# Patient Record
Sex: MALE | Race: WHITE | NOT HISPANIC OR LATINO | Employment: OTHER | URBAN - METROPOLITAN AREA
[De-identification: names, ages, dates, MRNs, and addresses within clinical notes are randomized per-mention and may not be internally consistent; named-entity substitution may affect disease eponyms.]

---

## 2017-01-14 ENCOUNTER — COMMUNICATION - HEALTHEAST (OUTPATIENT)
Dept: FAMILY MEDICINE | Facility: CLINIC | Age: 72
End: 2017-01-14

## 2017-02-06 ENCOUNTER — AMBULATORY - HEALTHEAST (OUTPATIENT)
Dept: CARDIOLOGY | Facility: CLINIC | Age: 72
End: 2017-02-06

## 2017-02-06 DIAGNOSIS — Z95.810 ICD (IMPLANTABLE CARDIOVERTER-DEFIBRILLATOR) IN PLACE: ICD-10-CM

## 2017-02-17 ENCOUNTER — COMMUNICATION - HEALTHEAST (OUTPATIENT)
Dept: CARDIOLOGY | Facility: CLINIC | Age: 72
End: 2017-02-17

## 2017-02-17 DIAGNOSIS — I42.9 CARDIOMYOPATHY (H): ICD-10-CM

## 2017-02-17 DIAGNOSIS — I51.9 LV DYSFUNCTION: ICD-10-CM

## 2017-02-17 DIAGNOSIS — I10 UNSPECIFIED ESSENTIAL HYPERTENSION: ICD-10-CM

## 2017-02-21 ENCOUNTER — COMMUNICATION - HEALTHEAST (OUTPATIENT)
Dept: CARDIOLOGY | Facility: CLINIC | Age: 72
End: 2017-02-21

## 2017-02-21 DIAGNOSIS — I51.9 LV DYSFUNCTION: ICD-10-CM

## 2017-02-22 ENCOUNTER — COMMUNICATION - HEALTHEAST (OUTPATIENT)
Dept: CARDIOLOGY | Facility: CLINIC | Age: 72
End: 2017-02-22

## 2017-02-22 DIAGNOSIS — I42.9 CARDIOMYOPATHY (H): ICD-10-CM

## 2017-03-14 ENCOUNTER — COMMUNICATION - HEALTHEAST (OUTPATIENT)
Dept: CARDIOLOGY | Facility: CLINIC | Age: 72
End: 2017-03-14

## 2017-03-14 DIAGNOSIS — E78.5 HYPERLIPIDEMIA: ICD-10-CM

## 2017-04-24 ENCOUNTER — COMMUNICATION - HEALTHEAST (OUTPATIENT)
Dept: CARDIOLOGY | Facility: CLINIC | Age: 72
End: 2017-04-24

## 2017-04-24 DIAGNOSIS — I42.9 CARDIOMYOPATHY (H): ICD-10-CM

## 2017-04-24 DIAGNOSIS — I10 UNSPECIFIED ESSENTIAL HYPERTENSION: ICD-10-CM

## 2017-04-25 ENCOUNTER — AMBULATORY - HEALTHEAST (OUTPATIENT)
Dept: CARDIOLOGY | Facility: CLINIC | Age: 72
End: 2017-04-25

## 2017-04-25 ENCOUNTER — OFFICE VISIT - HEALTHEAST (OUTPATIENT)
Dept: CARDIOLOGY | Facility: CLINIC | Age: 72
End: 2017-04-25

## 2017-04-25 DIAGNOSIS — Z95.810 ICD (IMPLANTABLE CARDIOVERTER-DEFIBRILLATOR), BIVENTRICULAR, IN SITU: ICD-10-CM

## 2017-04-25 DIAGNOSIS — G47.33 OSA ON CPAP: ICD-10-CM

## 2017-04-25 DIAGNOSIS — I46.9 CARDIAC ARREST (H): ICD-10-CM

## 2017-04-25 DIAGNOSIS — I10 ESSENTIAL HYPERTENSION WITH GOAL BLOOD PRESSURE LESS THAN 130/80: ICD-10-CM

## 2017-04-25 ASSESSMENT — MIFFLIN-ST. JEOR: SCORE: 1816.17

## 2017-05-17 ENCOUNTER — OFFICE VISIT - HEALTHEAST (OUTPATIENT)
Dept: FAMILY MEDICINE | Facility: CLINIC | Age: 72
End: 2017-05-17

## 2017-05-17 DIAGNOSIS — I25.5 ISCHEMIC CARDIOMYOPATHY: ICD-10-CM

## 2017-05-17 DIAGNOSIS — R73.01 IMPAIRED FASTING GLUCOSE: ICD-10-CM

## 2017-05-17 DIAGNOSIS — I10 ESSENTIAL HYPERTENSION WITH GOAL BLOOD PRESSURE LESS THAN 130/80: ICD-10-CM

## 2017-05-17 DIAGNOSIS — I25.10 CORONARY ARTERY DISEASE INVOLVING NATIVE CORONARY ARTERY OF NATIVE HEART WITHOUT ANGINA PECTORIS: ICD-10-CM

## 2017-06-11 ENCOUNTER — COMMUNICATION - HEALTHEAST (OUTPATIENT)
Dept: CARDIOLOGY | Facility: CLINIC | Age: 72
End: 2017-06-11

## 2017-06-11 DIAGNOSIS — E78.5 HYPERLIPIDEMIA: ICD-10-CM

## 2017-07-24 ENCOUNTER — AMBULATORY - HEALTHEAST (OUTPATIENT)
Dept: CARDIOLOGY | Facility: CLINIC | Age: 72
End: 2017-07-24

## 2017-07-24 DIAGNOSIS — Z95.810 ICD (IMPLANTABLE CARDIOVERTER-DEFIBRILLATOR), BIVENTRICULAR, IN SITU: ICD-10-CM

## 2017-07-24 LAB — HCC DEVICE COMMENTS: NORMAL

## 2017-08-21 ENCOUNTER — COMMUNICATION - HEALTHEAST (OUTPATIENT)
Dept: CARDIOLOGY | Facility: CLINIC | Age: 72
End: 2017-08-21

## 2017-08-21 DIAGNOSIS — I42.9 CARDIOMYOPATHY (H): ICD-10-CM

## 2017-08-21 DIAGNOSIS — I10 UNSPECIFIED ESSENTIAL HYPERTENSION: ICD-10-CM

## 2017-10-28 ENCOUNTER — COMMUNICATION - HEALTHEAST (OUTPATIENT)
Dept: CARDIOLOGY | Facility: CLINIC | Age: 72
End: 2017-10-28

## 2017-10-28 DIAGNOSIS — I42.9 CARDIOMYOPATHY (H): ICD-10-CM

## 2017-10-28 DIAGNOSIS — E78.5 HYPERLIPIDEMIA: ICD-10-CM

## 2017-10-30 ENCOUNTER — AMBULATORY - HEALTHEAST (OUTPATIENT)
Dept: CARDIOLOGY | Facility: CLINIC | Age: 72
End: 2017-10-30

## 2017-10-30 DIAGNOSIS — Z95.810 ICD (IMPLANTABLE CARDIOVERTER-DEFIBRILLATOR), BIVENTRICULAR, IN SITU: ICD-10-CM

## 2017-10-30 LAB — HCC DEVICE COMMENTS: NORMAL

## 2017-11-15 ENCOUNTER — OFFICE VISIT - HEALTHEAST (OUTPATIENT)
Dept: FAMILY MEDICINE | Facility: CLINIC | Age: 72
End: 2017-11-15

## 2017-11-15 DIAGNOSIS — Z23 NEED FOR VACCINATION: ICD-10-CM

## 2017-11-15 DIAGNOSIS — E66.09 CLASS 1 OBESITY DUE TO EXCESS CALORIES WITH SERIOUS COMORBIDITY AND BODY MASS INDEX (BMI) OF 33.0 TO 33.9 IN ADULT: ICD-10-CM

## 2017-11-15 DIAGNOSIS — I25.10 CORONARY ARTERY DISEASE INVOLVING NATIVE CORONARY ARTERY OF NATIVE HEART WITHOUT ANGINA PECTORIS: ICD-10-CM

## 2017-11-15 DIAGNOSIS — I25.5 ISCHEMIC CARDIOMYOPATHY: ICD-10-CM

## 2017-11-15 DIAGNOSIS — I10 ESSENTIAL HYPERTENSION WITH GOAL BLOOD PRESSURE LESS THAN 130/80: ICD-10-CM

## 2017-11-15 DIAGNOSIS — D12.6 TUBULAR ADENOMA OF COLON: ICD-10-CM

## 2017-11-15 DIAGNOSIS — N52.9 ERECTILE DYSFUNCTION, UNSPECIFIED ERECTILE DYSFUNCTION TYPE: ICD-10-CM

## 2017-11-15 DIAGNOSIS — G47.33 OSA ON CPAP: ICD-10-CM

## 2017-11-15 DIAGNOSIS — L91.8 SKIN TAG: ICD-10-CM

## 2017-11-15 DIAGNOSIS — E78.00 PURE HYPERCHOLESTEROLEMIA: ICD-10-CM

## 2017-11-15 DIAGNOSIS — R73.01 IMPAIRED FASTING GLUCOSE: ICD-10-CM

## 2017-11-15 DIAGNOSIS — H91.93 BILATERAL HEARING LOSS: ICD-10-CM

## 2017-11-15 DIAGNOSIS — E66.811 CLASS 1 OBESITY DUE TO EXCESS CALORIES WITH SERIOUS COMORBIDITY AND BODY MASS INDEX (BMI) OF 33.0 TO 33.9 IN ADULT: ICD-10-CM

## 2017-11-15 DIAGNOSIS — Z00.01 ENCOUNTER FOR GENERAL ADULT MEDICAL EXAMINATION WITH ABNORMAL FINDINGS: ICD-10-CM

## 2017-11-15 LAB
CHOLEST SERPL-MCNC: 128 MG/DL
FASTING STATUS PATIENT QL REPORTED: YES
HBA1C MFR BLD: 6.1 % (ref 3.5–6)
HDLC SERPL-MCNC: 42 MG/DL
LDLC SERPL CALC-MCNC: 65 MG/DL
TRIGL SERPL-MCNC: 106 MG/DL

## 2017-11-15 ASSESSMENT — MIFFLIN-ST. JEOR: SCORE: 1838.29

## 2017-11-16 ENCOUNTER — COMMUNICATION - HEALTHEAST (OUTPATIENT)
Dept: FAMILY MEDICINE | Facility: CLINIC | Age: 72
End: 2017-11-16

## 2018-01-19 ENCOUNTER — COMMUNICATION - HEALTHEAST (OUTPATIENT)
Dept: FAMILY MEDICINE | Facility: CLINIC | Age: 73
End: 2018-01-19

## 2018-01-19 DIAGNOSIS — K21.9 GERD (GASTROESOPHAGEAL REFLUX DISEASE): ICD-10-CM

## 2018-01-19 DIAGNOSIS — E29.1 HYPOGONADISM MALE: ICD-10-CM

## 2018-01-20 ENCOUNTER — COMMUNICATION - HEALTHEAST (OUTPATIENT)
Dept: FAMILY MEDICINE | Facility: CLINIC | Age: 73
End: 2018-01-20

## 2018-01-20 DIAGNOSIS — G89.4 CHRONIC PAIN SYNDROME: ICD-10-CM

## 2018-01-24 ENCOUNTER — COMMUNICATION - HEALTHEAST (OUTPATIENT)
Dept: FAMILY MEDICINE | Facility: CLINIC | Age: 73
End: 2018-01-24

## 2018-01-25 ENCOUNTER — RECORDS - HEALTHEAST (OUTPATIENT)
Dept: ADMINISTRATIVE | Facility: OTHER | Age: 73
End: 2018-01-25

## 2018-02-04 ENCOUNTER — COMMUNICATION - HEALTHEAST (OUTPATIENT)
Dept: FAMILY MEDICINE | Facility: CLINIC | Age: 73
End: 2018-02-04

## 2018-02-05 ENCOUNTER — AMBULATORY - HEALTHEAST (OUTPATIENT)
Dept: CARDIOLOGY | Facility: CLINIC | Age: 73
End: 2018-02-05

## 2018-02-05 ENCOUNTER — COMMUNICATION - HEALTHEAST (OUTPATIENT)
Dept: ADMINISTRATIVE | Facility: CLINIC | Age: 73
End: 2018-02-05

## 2018-02-05 DIAGNOSIS — Z95.810 ICD (IMPLANTABLE CARDIOVERTER-DEFIBRILLATOR), BIVENTRICULAR, IN SITU: ICD-10-CM

## 2018-02-05 DIAGNOSIS — I46.9 CARDIAC ARREST (H): ICD-10-CM

## 2018-02-05 LAB — HCC DEVICE COMMENTS: NORMAL

## 2018-02-07 ENCOUNTER — COMMUNICATION - HEALTHEAST (OUTPATIENT)
Dept: FAMILY MEDICINE | Facility: CLINIC | Age: 73
End: 2018-02-07

## 2018-02-07 DIAGNOSIS — E29.1 HYPOGONADISM MALE: ICD-10-CM

## 2018-02-09 ENCOUNTER — COMMUNICATION - HEALTHEAST (OUTPATIENT)
Dept: FAMILY MEDICINE | Facility: CLINIC | Age: 73
End: 2018-02-09

## 2018-02-20 ENCOUNTER — COMMUNICATION - HEALTHEAST (OUTPATIENT)
Dept: CARDIOLOGY | Facility: CLINIC | Age: 73
End: 2018-02-20

## 2018-02-20 DIAGNOSIS — I51.9 LV DYSFUNCTION: ICD-10-CM

## 2018-02-21 ENCOUNTER — AMBULATORY - HEALTHEAST (OUTPATIENT)
Dept: CARDIOLOGY | Facility: CLINIC | Age: 73
End: 2018-02-21

## 2018-02-21 DIAGNOSIS — I51.9 LV DYSFUNCTION: ICD-10-CM

## 2018-03-22 ENCOUNTER — COMMUNICATION - HEALTHEAST (OUTPATIENT)
Dept: FAMILY MEDICINE | Facility: CLINIC | Age: 73
End: 2018-03-22

## 2018-06-06 ENCOUNTER — COMMUNICATION - HEALTHEAST (OUTPATIENT)
Dept: CARDIOLOGY | Facility: CLINIC | Age: 73
End: 2018-06-06

## 2018-06-06 ENCOUNTER — AMBULATORY - HEALTHEAST (OUTPATIENT)
Dept: CARDIOLOGY | Facility: CLINIC | Age: 73
End: 2018-06-06

## 2018-06-06 DIAGNOSIS — E78.5 HYPERLIPIDEMIA: ICD-10-CM

## 2018-06-06 DIAGNOSIS — I42.9 CARDIOMYOPATHY (H): ICD-10-CM

## 2018-06-06 DIAGNOSIS — I10 ESSENTIAL HYPERTENSION: ICD-10-CM

## 2018-06-06 DIAGNOSIS — I25.5 ISCHEMIC CARDIOMYOPATHY: ICD-10-CM

## 2018-06-12 ENCOUNTER — OFFICE VISIT - HEALTHEAST (OUTPATIENT)
Dept: FAMILY MEDICINE | Facility: CLINIC | Age: 73
End: 2018-06-12

## 2018-06-12 DIAGNOSIS — J30.9 AR (ALLERGIC RHINITIS): ICD-10-CM

## 2018-06-12 DIAGNOSIS — I10 ESSENTIAL HYPERTENSION WITH GOAL BLOOD PRESSURE LESS THAN 130/80: ICD-10-CM

## 2018-06-12 DIAGNOSIS — E78.00 PURE HYPERCHOLESTEROLEMIA: ICD-10-CM

## 2018-06-12 DIAGNOSIS — I25.5 ISCHEMIC CARDIOMYOPATHY: ICD-10-CM

## 2018-06-12 DIAGNOSIS — R73.01 IMPAIRED FASTING GLUCOSE: ICD-10-CM

## 2018-06-12 LAB
ANION GAP SERPL CALCULATED.3IONS-SCNC: 9 MMOL/L (ref 5–18)
BNP SERPL-MCNC: 30 PG/ML (ref 0–69)
BUN SERPL-MCNC: 10 MG/DL (ref 8–28)
CALCIUM SERPL-MCNC: 9.6 MG/DL (ref 8.5–10.5)
CHLORIDE BLD-SCNC: 104 MMOL/L (ref 98–107)
CHOLEST SERPL-MCNC: 138 MG/DL
CO2 SERPL-SCNC: 25 MMOL/L (ref 22–31)
CREAT SERPL-MCNC: 1.09 MG/DL (ref 0.7–1.3)
FASTING STATUS PATIENT QL REPORTED: YES
GFR SERPL CREATININE-BSD FRML MDRD: >60 ML/MIN/1.73M2
GLUCOSE BLD-MCNC: 85 MG/DL (ref 70–125)
HBA1C MFR BLD: 5.7 % (ref 3.5–6)
HDLC SERPL-MCNC: 41 MG/DL
LDLC SERPL CALC-MCNC: 76 MG/DL
POTASSIUM BLD-SCNC: 4.6 MMOL/L (ref 3.5–5)
SODIUM SERPL-SCNC: 138 MMOL/L (ref 136–145)
TRIGL SERPL-MCNC: 103 MG/DL

## 2018-06-14 ENCOUNTER — AMBULATORY - HEALTHEAST (OUTPATIENT)
Dept: CARDIOLOGY | Facility: CLINIC | Age: 73
End: 2018-06-14

## 2018-06-14 DIAGNOSIS — Z95.810 ICD (IMPLANTABLE CARDIOVERTER-DEFIBRILLATOR), BIVENTRICULAR, IN SITU: ICD-10-CM

## 2018-06-14 LAB
HCC DEVICE COMMENTS: NORMAL
HCC DEVICE IMPLANTING PROVIDER: NORMAL
HCC DEVICE MANUFACTURE: NORMAL
HCC DEVICE MODEL: NORMAL
HCC DEVICE SERIAL NUMBER: NORMAL
HCC DEVICE TYPE: NORMAL

## 2018-06-21 ENCOUNTER — COMMUNICATION - HEALTHEAST (OUTPATIENT)
Dept: FAMILY MEDICINE | Facility: CLINIC | Age: 73
End: 2018-06-21

## 2018-06-21 DIAGNOSIS — F52.8 PSYCHOSEXUAL DYSFUNCTION WITH INHIBITED SEXUAL EXCITEMENT: ICD-10-CM

## 2018-06-27 ENCOUNTER — COMMUNICATION - HEALTHEAST (OUTPATIENT)
Dept: FAMILY MEDICINE | Facility: CLINIC | Age: 73
End: 2018-06-27

## 2018-06-27 DIAGNOSIS — F52.8 PSYCHOSEXUAL DYSFUNCTION WITH INHIBITED SEXUAL EXCITEMENT: ICD-10-CM

## 2018-08-14 ENCOUNTER — COMMUNICATION - HEALTHEAST (OUTPATIENT)
Dept: CARDIOLOGY | Facility: CLINIC | Age: 73
End: 2018-08-14

## 2018-08-14 DIAGNOSIS — E78.5 HYPERLIPIDEMIA: ICD-10-CM

## 2018-08-14 DIAGNOSIS — I10 ESSENTIAL HYPERTENSION: ICD-10-CM

## 2018-08-14 DIAGNOSIS — I51.9 LV DYSFUNCTION: ICD-10-CM

## 2018-08-14 DIAGNOSIS — I42.9 CARDIOMYOPATHY (H): ICD-10-CM

## 2018-08-22 ENCOUNTER — COMMUNICATION - HEALTHEAST (OUTPATIENT)
Dept: CARDIOLOGY | Facility: CLINIC | Age: 73
End: 2018-08-22

## 2018-08-23 ENCOUNTER — HOSPITAL ENCOUNTER (OUTPATIENT)
Dept: CARDIOLOGY | Facility: HOSPITAL | Age: 73
Discharge: HOME | End: 2018-08-23
Attending: INTERNAL MEDICINE

## 2018-08-23 DIAGNOSIS — Z95.810 ICD (IMPLANTABLE CARDIOVERTER-DEFIBRILLATOR), BIVENTRICULAR, IN SITU: ICD-10-CM

## 2018-08-23 DIAGNOSIS — I46.9 CARDIAC ARREST (H): ICD-10-CM

## 2018-08-23 ASSESSMENT — MIFFLIN-ST. JEOR: SCORE: 1811.07

## 2018-08-24 LAB
AORTIC ROOT: 3.6 CM
AR DECEL SLOPE: 2110 MM/S2
AR PEAK VELOCITY: 312 CM/S
AV REGURGITANT PEAK GRADIENT: 38.9 MMHG
AV REGURGITATION PRESSURE HALF TIME: 433 MS
BSA FOR ECHO PROCEDURE: 2.29 M2
CV BLOOD PRESSURE: NORMAL MMHG
CV ECHO HEIGHT: 70.5 IN
CV ECHO WEIGHT: 233 LBS
DOP CALC LVOT AREA: 3.46 CM2
DOP CALC LVOT DIAMETER: 2.1 CM
DOP CALC LVOT STROKE VOLUME: 63.7 CM3
DOP CALCLVOT PEAK VEL VTI: 18.4 CM
EJECTION FRACTION: 51 % (ref 55–75)
LA AREA 1: 16.2 CM2
LA AREA 2: 18.2 CM2
LEFT ATRIUM LENGTH: 5.15 CM
LEFT ATRIUM SIZE: 3.9 CM
LEFT ATRIUM TO AORTIC ROOT RATIO: 0.94 NO UNITS
LEFT ATRIUM VOLUME INDEX: 21.3 ML/M2
LEFT ATRIUM VOLUME: 48.7 ML
LEFT VENTRICLE CARDIAC INDEX: 1.9 L/MIN/M2
LEFT VENTRICLE CARDIAC OUTPUT: 4.3 L/MIN
LEFT VENTRICLE DIASTOLIC VOLUME INDEX: 64.6 CM3/M2 (ref 34–74)
LEFT VENTRICLE DIASTOLIC VOLUME: 148 CM3 (ref 62–150)
LEFT VENTRICLE HEART RATE: 67 BPM
LEFT VENTRICLE SYSTOLIC VOLUME INDEX: 31.4 CM3/M2 (ref 11–31)
LEFT VENTRICLE SYSTOLIC VOLUME: 72 CM3 (ref 21–61)
LEFT VENTRICULAR OUTFLOW TRACT MEAN GRADIENT: 2 MMHG
LEFT VENTRICULAR OUTFLOW TRACT MEAN VELOCITY: 60.3 CM/S
LV STROKE VOLUME INDEX: 27.8 ML/M2
MITRAL VALVE E/A RATIO: 0.7
MV AVERAGE E/E' RATIO: 8.6 CM/S
MV DECELERATION TIME: 250 MS
MV E'TISSUE VEL-LAT: 7.99 CM/S
MV E'TISSUE VEL-MED: 4.58 CM/S
MV LATERAL E/E' RATIO: 6.7
MV MEDIAL E/E' RATIO: 11.7
MV PEAK A VELOCITY: 76 CM/S
MV PEAK E VELOCITY: 53.8 CM/S
NUC REST DIASTOLIC VOLUME INDEX: 3728 LBS
NUC REST SYSTOLIC VOLUME INDEX: 70.5 IN
TRICUSPID VALVE ANULAR PLANE SYSTOLIC EXCURSION: 1.8 CM

## 2018-08-30 ENCOUNTER — OFFICE VISIT - HEALTHEAST (OUTPATIENT)
Dept: CARDIOLOGY | Facility: CLINIC | Age: 73
End: 2018-08-30

## 2018-08-30 ENCOUNTER — AMBULATORY - HEALTHEAST (OUTPATIENT)
Dept: CARDIOLOGY | Facility: CLINIC | Age: 73
End: 2018-08-30

## 2018-08-30 DIAGNOSIS — G47.33 OSA ON CPAP: ICD-10-CM

## 2018-08-30 DIAGNOSIS — Z95.810 ICD (IMPLANTABLE CARDIOVERTER-DEFIBRILLATOR), BIVENTRICULAR, IN SITU: ICD-10-CM

## 2018-08-30 DIAGNOSIS — I47.29 NSVT (NONSUSTAINED VENTRICULAR TACHYCARDIA) (H): ICD-10-CM

## 2018-09-30 ENCOUNTER — COMMUNICATION - HEALTHEAST (OUTPATIENT)
Dept: FAMILY MEDICINE | Facility: CLINIC | Age: 73
End: 2018-09-30

## 2018-10-01 ENCOUNTER — OFFICE VISIT - HEALTHEAST (OUTPATIENT)
Dept: FAMILY MEDICINE | Facility: CLINIC | Age: 73
End: 2018-10-01

## 2018-10-01 DIAGNOSIS — K59.00 CONSTIPATION, UNSPECIFIED CONSTIPATION TYPE: ICD-10-CM

## 2018-10-01 DIAGNOSIS — Z23 NEED FOR VACCINATION: ICD-10-CM

## 2018-10-01 DIAGNOSIS — I10 ESSENTIAL HYPERTENSION: ICD-10-CM

## 2018-10-01 DIAGNOSIS — R19.04 ABDOMINAL SWELLING, LEFT LOWER QUADRANT: ICD-10-CM

## 2018-10-01 LAB
ERYTHROCYTE [DISTWIDTH] IN BLOOD BY AUTOMATED COUNT: 12 % (ref 11–14.5)
HCT VFR BLD AUTO: 48.3 % (ref 40–54)
HGB BLD-MCNC: 16.5 G/DL (ref 14–18)
MCH RBC QN AUTO: 32.5 PG (ref 27–34)
MCHC RBC AUTO-ENTMCNC: 34.2 G/DL (ref 32–36)
MCV RBC AUTO: 95 FL (ref 80–100)
PLATELET # BLD AUTO: 155 THOU/UL (ref 140–440)
PMV BLD AUTO: 8.3 FL (ref 7–10)
RBC # BLD AUTO: 5.09 MILL/UL (ref 4.4–6.2)
WBC: 7.7 THOU/UL (ref 4–11)

## 2018-10-01 ASSESSMENT — MIFFLIN-ST. JEOR: SCORE: 1773.88

## 2018-10-03 ENCOUNTER — HOSPITAL ENCOUNTER (OUTPATIENT)
Dept: ULTRASOUND IMAGING | Facility: HOSPITAL | Age: 73
Discharge: HOME | End: 2018-10-03
Attending: NURSE PRACTITIONER

## 2018-10-03 DIAGNOSIS — R19.04 ABDOMINAL SWELLING, LEFT LOWER QUADRANT: ICD-10-CM

## 2018-10-04 ENCOUNTER — COMMUNICATION - HEALTHEAST (OUTPATIENT)
Dept: FAMILY MEDICINE | Facility: CLINIC | Age: 73
End: 2018-10-04

## 2018-10-25 ENCOUNTER — COMMUNICATION - HEALTHEAST (OUTPATIENT)
Dept: CARDIOLOGY | Facility: CLINIC | Age: 73
End: 2018-10-25

## 2018-10-25 DIAGNOSIS — I42.9 CARDIOMYOPATHY (H): ICD-10-CM

## 2018-10-25 DIAGNOSIS — I10 ESSENTIAL HYPERTENSION: ICD-10-CM

## 2018-10-25 DIAGNOSIS — E78.5 HYPERLIPIDEMIA: ICD-10-CM

## 2018-10-25 DIAGNOSIS — I51.9 LV DYSFUNCTION: ICD-10-CM

## 2018-12-03 ENCOUNTER — AMBULATORY - HEALTHEAST (OUTPATIENT)
Dept: CARDIOLOGY | Facility: CLINIC | Age: 73
End: 2018-12-03

## 2018-12-03 DIAGNOSIS — Z95.810 ICD (IMPLANTABLE CARDIOVERTER-DEFIBRILLATOR), BIVENTRICULAR, IN SITU: ICD-10-CM

## 2018-12-12 ENCOUNTER — OFFICE VISIT - HEALTHEAST (OUTPATIENT)
Dept: FAMILY MEDICINE | Facility: CLINIC | Age: 73
End: 2018-12-12

## 2018-12-12 DIAGNOSIS — H91.93 BILATERAL HEARING LOSS, UNSPECIFIED HEARING LOSS TYPE: ICD-10-CM

## 2018-12-12 DIAGNOSIS — E66.811 CLASS 1 OBESITY DUE TO EXCESS CALORIES WITH SERIOUS COMORBIDITY AND BODY MASS INDEX (BMI) OF 31.0 TO 31.9 IN ADULT: ICD-10-CM

## 2018-12-12 DIAGNOSIS — E29.1 HYPOGONADISM MALE: ICD-10-CM

## 2018-12-12 DIAGNOSIS — F52.8 PSYCHOSEXUAL DYSFUNCTION WITH INHIBITED SEXUAL EXCITEMENT: ICD-10-CM

## 2018-12-12 DIAGNOSIS — E66.09 CLASS 1 OBESITY DUE TO EXCESS CALORIES WITH SERIOUS COMORBIDITY AND BODY MASS INDEX (BMI) OF 31.0 TO 31.9 IN ADULT: ICD-10-CM

## 2018-12-12 DIAGNOSIS — I10 ESSENTIAL HYPERTENSION WITH GOAL BLOOD PRESSURE LESS THAN 130/80: ICD-10-CM

## 2018-12-12 DIAGNOSIS — E78.00 PURE HYPERCHOLESTEROLEMIA: ICD-10-CM

## 2018-12-12 DIAGNOSIS — K21.9 GASTROESOPHAGEAL REFLUX DISEASE WITHOUT ESOPHAGITIS: ICD-10-CM

## 2018-12-12 DIAGNOSIS — D12.6 TUBULAR ADENOMA OF COLON: ICD-10-CM

## 2018-12-12 DIAGNOSIS — G47.33 OSA ON CPAP: ICD-10-CM

## 2018-12-12 DIAGNOSIS — Z00.01 ENCOUNTER FOR GENERAL ADULT MEDICAL EXAMINATION WITH ABNORMAL FINDINGS: ICD-10-CM

## 2018-12-12 DIAGNOSIS — I25.5 ISCHEMIC CARDIOMYOPATHY: ICD-10-CM

## 2018-12-12 DIAGNOSIS — R73.01 IMPAIRED FASTING GLUCOSE: ICD-10-CM

## 2018-12-12 LAB
ALBUMIN SERPL-MCNC: 4.3 G/DL (ref 3.5–5)
ALP SERPL-CCNC: 57 U/L (ref 45–120)
ALT SERPL W P-5'-P-CCNC: 33 U/L (ref 0–45)
ANION GAP SERPL CALCULATED.3IONS-SCNC: 9 MMOL/L (ref 5–18)
AST SERPL W P-5'-P-CCNC: 27 U/L (ref 0–40)
BILIRUB SERPL-MCNC: 0.7 MG/DL (ref 0–1)
BUN SERPL-MCNC: 23 MG/DL (ref 8–28)
CALCIUM SERPL-MCNC: 10.1 MG/DL (ref 8.5–10.5)
CHLORIDE BLD-SCNC: 103 MMOL/L (ref 98–107)
CHOLEST SERPL-MCNC: 155 MG/DL
CO2 SERPL-SCNC: 28 MMOL/L (ref 22–31)
CREAT SERPL-MCNC: 1.13 MG/DL (ref 0.7–1.3)
FASTING STATUS PATIENT QL REPORTED: YES
GFR SERPL CREATININE-BSD FRML MDRD: >60 ML/MIN/1.73M2
GLUCOSE BLD-MCNC: 89 MG/DL (ref 70–125)
HBA1C MFR BLD: 5.7 % (ref 3.5–6)
HDLC SERPL-MCNC: 56 MG/DL
LDLC SERPL CALC-MCNC: 83 MG/DL
POTASSIUM BLD-SCNC: 4.1 MMOL/L (ref 3.5–5)
PROT SERPL-MCNC: 7.5 G/DL (ref 6–8)
SODIUM SERPL-SCNC: 140 MMOL/L (ref 136–145)
TRIGL SERPL-MCNC: 79 MG/DL

## 2018-12-12 ASSESSMENT — MIFFLIN-ST. JEOR: SCORE: 1753.24

## 2018-12-13 LAB — HCV AB SERPL QL IA: NEGATIVE

## 2018-12-28 ENCOUNTER — COMMUNICATION - HEALTHEAST (OUTPATIENT)
Dept: FAMILY MEDICINE | Facility: CLINIC | Age: 73
End: 2018-12-28

## 2018-12-28 DIAGNOSIS — G47.00 INSOMNIA, UNSPECIFIED: ICD-10-CM

## 2019-03-11 ENCOUNTER — AMBULATORY - HEALTHEAST (OUTPATIENT)
Dept: CARDIOLOGY | Facility: CLINIC | Age: 74
End: 2019-03-11

## 2019-03-11 DIAGNOSIS — Z95.810 ICD (IMPLANTABLE CARDIOVERTER-DEFIBRILLATOR), BIVENTRICULAR, IN SITU: ICD-10-CM

## 2019-03-18 ENCOUNTER — COMMUNICATION - HEALTHEAST (OUTPATIENT)
Dept: FAMILY MEDICINE | Facility: CLINIC | Age: 74
End: 2019-03-18

## 2019-03-19 ENCOUNTER — AMBULATORY - HEALTHEAST (OUTPATIENT)
Dept: FAMILY MEDICINE | Facility: CLINIC | Age: 74
End: 2019-03-19

## 2019-03-19 ENCOUNTER — COMMUNICATION - HEALTHEAST (OUTPATIENT)
Dept: FAMILY MEDICINE | Facility: CLINIC | Age: 74
End: 2019-03-19

## 2019-03-19 DIAGNOSIS — R10.11 RUQ ABDOMINAL PAIN: ICD-10-CM

## 2019-03-19 DIAGNOSIS — R49.0 HOARSENESS: ICD-10-CM

## 2019-03-21 ENCOUNTER — HOSPITAL ENCOUNTER (OUTPATIENT)
Dept: ULTRASOUND IMAGING | Facility: HOSPITAL | Age: 74
Discharge: HOME | End: 2019-03-21
Attending: FAMILY MEDICINE

## 2019-03-21 DIAGNOSIS — R10.11 RUQ ABDOMINAL PAIN: ICD-10-CM

## 2019-03-26 ENCOUNTER — RECORDS - HEALTHEAST (OUTPATIENT)
Dept: ADMINISTRATIVE | Facility: OTHER | Age: 74
End: 2019-03-26

## 2019-04-06 ENCOUNTER — COMMUNICATION - HEALTHEAST (OUTPATIENT)
Dept: FAMILY MEDICINE | Facility: CLINIC | Age: 74
End: 2019-04-06

## 2019-04-09 ENCOUNTER — OFFICE VISIT - HEALTHEAST (OUTPATIENT)
Dept: FAMILY MEDICINE | Facility: CLINIC | Age: 74
End: 2019-04-09

## 2019-04-09 DIAGNOSIS — G47.33 OSA ON CPAP: ICD-10-CM

## 2019-04-09 DIAGNOSIS — K80.20 CALCULUS OF GALLBLADDER WITHOUT CHOLECYSTITIS WITHOUT OBSTRUCTION: ICD-10-CM

## 2019-04-09 DIAGNOSIS — I10 ESSENTIAL HYPERTENSION: ICD-10-CM

## 2019-04-09 ASSESSMENT — MIFFLIN-ST. JEOR: SCORE: 1819.01

## 2019-04-19 ENCOUNTER — COMMUNICATION - HEALTHEAST (OUTPATIENT)
Dept: FAMILY MEDICINE | Facility: CLINIC | Age: 74
End: 2019-04-19

## 2019-04-24 ENCOUNTER — RECORDS - HEALTHEAST (OUTPATIENT)
Dept: ADMINISTRATIVE | Facility: OTHER | Age: 74
End: 2019-04-24

## 2019-05-18 ENCOUNTER — COMMUNICATION - HEALTHEAST (OUTPATIENT)
Dept: FAMILY MEDICINE | Facility: CLINIC | Age: 74
End: 2019-05-18

## 2019-05-18 DIAGNOSIS — F52.8 PSYCHOSEXUAL DYSFUNCTION WITH INHIBITED SEXUAL EXCITEMENT: ICD-10-CM

## 2019-05-19 ENCOUNTER — COMMUNICATION - HEALTHEAST (OUTPATIENT)
Dept: FAMILY MEDICINE | Facility: CLINIC | Age: 74
End: 2019-05-19

## 2019-05-19 DIAGNOSIS — K21.9 GERD (GASTROESOPHAGEAL REFLUX DISEASE): ICD-10-CM

## 2019-05-21 ENCOUNTER — COMMUNICATION - HEALTHEAST (OUTPATIENT)
Dept: FAMILY MEDICINE | Facility: CLINIC | Age: 74
End: 2019-05-21

## 2019-05-21 DIAGNOSIS — F52.8 PSYCHOSEXUAL DYSFUNCTION WITH INHIBITED SEXUAL EXCITEMENT: ICD-10-CM

## 2019-06-12 ENCOUNTER — OFFICE VISIT - HEALTHEAST (OUTPATIENT)
Dept: FAMILY MEDICINE | Facility: CLINIC | Age: 74
End: 2019-06-12

## 2019-06-12 DIAGNOSIS — F52.8 PSYCHOSEXUAL DYSFUNCTION WITH INHIBITED SEXUAL EXCITEMENT: ICD-10-CM

## 2019-06-12 DIAGNOSIS — Z23 NEED FOR VACCINATION: ICD-10-CM

## 2019-06-12 DIAGNOSIS — I25.5 ISCHEMIC CARDIOMYOPATHY: ICD-10-CM

## 2019-06-12 DIAGNOSIS — R73.01 IMPAIRED FASTING GLUCOSE: ICD-10-CM

## 2019-06-12 DIAGNOSIS — I10 ESSENTIAL HYPERTENSION WITH GOAL BLOOD PRESSURE LESS THAN 130/80: ICD-10-CM

## 2019-06-12 LAB
ANION GAP SERPL CALCULATED.3IONS-SCNC: 8 MMOL/L (ref 5–18)
BUN SERPL-MCNC: 19 MG/DL (ref 8–28)
CALCIUM SERPL-MCNC: 9.5 MG/DL (ref 8.5–10.5)
CHLORIDE BLD-SCNC: 106 MMOL/L (ref 98–107)
CO2 SERPL-SCNC: 25 MMOL/L (ref 22–31)
CREAT SERPL-MCNC: 1.29 MG/DL (ref 0.7–1.3)
GFR SERPL CREATININE-BSD FRML MDRD: 55 ML/MIN/1.73M2
GLUCOSE BLD-MCNC: 109 MG/DL (ref 70–125)
HBA1C MFR BLD: 5.7 % (ref 3.5–6)
POTASSIUM BLD-SCNC: 4.1 MMOL/L (ref 3.5–5)
SODIUM SERPL-SCNC: 139 MMOL/L (ref 136–145)

## 2019-06-13 ENCOUNTER — AMBULATORY - HEALTHEAST (OUTPATIENT)
Dept: CARDIOLOGY | Facility: CLINIC | Age: 74
End: 2019-06-13

## 2019-06-13 DIAGNOSIS — Z95.810 ICD (IMPLANTABLE CARDIOVERTER-DEFIBRILLATOR), BIVENTRICULAR, IN SITU: ICD-10-CM

## 2019-06-18 ENCOUNTER — RECORDS - HEALTHEAST (OUTPATIENT)
Dept: ADMINISTRATIVE | Facility: OTHER | Age: 74
End: 2019-06-18

## 2019-06-20 ENCOUNTER — COMMUNICATION - HEALTHEAST (OUTPATIENT)
Dept: CARDIOLOGY | Facility: CLINIC | Age: 74
End: 2019-06-20

## 2019-06-20 DIAGNOSIS — I51.9 LV DYSFUNCTION: ICD-10-CM

## 2019-06-20 DIAGNOSIS — I10 ESSENTIAL HYPERTENSION: ICD-10-CM

## 2019-06-20 DIAGNOSIS — E78.5 HYPERLIPIDEMIA: ICD-10-CM

## 2019-06-20 DIAGNOSIS — I42.9 CARDIOMYOPATHY (H): ICD-10-CM

## 2019-09-04 ENCOUNTER — COMMUNICATION - HEALTHEAST (OUTPATIENT)
Dept: FAMILY MEDICINE | Facility: CLINIC | Age: 74
End: 2019-09-04

## 2019-09-04 DIAGNOSIS — K80.20 CALCULUS OF GALLBLADDER WITHOUT CHOLECYSTITIS WITHOUT OBSTRUCTION: ICD-10-CM

## 2019-09-10 ENCOUNTER — OFFICE VISIT - HEALTHEAST (OUTPATIENT)
Dept: SURGERY | Facility: CLINIC | Age: 74
End: 2019-09-10

## 2019-09-10 DIAGNOSIS — K80.20 CALCULUS OF GALLBLADDER WITHOUT CHOLECYSTITIS WITHOUT OBSTRUCTION: ICD-10-CM

## 2019-09-10 RX ORDER — LATANOPROST 50 UG/ML
1 SOLUTION/ DROPS OPHTHALMIC AT BEDTIME
Status: SHIPPED | COMMUNITY
Start: 2019-09-03

## 2019-09-10 ASSESSMENT — MIFFLIN-ST. JEOR: SCORE: 1895.67

## 2019-09-12 ENCOUNTER — COMMUNICATION - HEALTHEAST (OUTPATIENT)
Dept: CARDIOLOGY | Facility: CLINIC | Age: 74
End: 2019-09-12

## 2019-09-16 ENCOUNTER — AMBULATORY - HEALTHEAST (OUTPATIENT)
Dept: CARDIOLOGY | Facility: CLINIC | Age: 74
End: 2019-09-16

## 2019-09-16 ENCOUNTER — OFFICE VISIT - HEALTHEAST (OUTPATIENT)
Dept: CARDIOLOGY | Facility: CLINIC | Age: 74
End: 2019-09-16

## 2019-09-16 DIAGNOSIS — Z95.810 ICD (IMPLANTABLE CARDIOVERTER-DEFIBRILLATOR), BIVENTRICULAR, IN SITU: ICD-10-CM

## 2019-09-16 DIAGNOSIS — E66.09 CLASS 1 OBESITY DUE TO EXCESS CALORIES WITH SERIOUS COMORBIDITY AND BODY MASS INDEX (BMI) OF 31.0 TO 31.9 IN ADULT: ICD-10-CM

## 2019-09-16 DIAGNOSIS — K80.20 CHOLELITHIASIS: ICD-10-CM

## 2019-09-16 DIAGNOSIS — E66.811 CLASS 1 OBESITY DUE TO EXCESS CALORIES WITH SERIOUS COMORBIDITY AND BODY MASS INDEX (BMI) OF 31.0 TO 31.9 IN ADULT: ICD-10-CM

## 2019-09-16 DIAGNOSIS — I48.0 PAROXYSMAL ATRIAL FIBRILLATION (H): ICD-10-CM

## 2019-09-16 DIAGNOSIS — G47.33 OSA ON CPAP: ICD-10-CM

## 2019-09-16 DIAGNOSIS — I44.2 COMPLETE AV BLOCK, ACQUIRED (H): ICD-10-CM

## 2019-09-16 ASSESSMENT — MIFFLIN-ST. JEOR: SCORE: 1878.66

## 2019-09-17 ENCOUNTER — COMMUNICATION - HEALTHEAST (OUTPATIENT)
Dept: SURGERY | Facility: CLINIC | Age: 74
End: 2019-09-17

## 2019-09-17 ENCOUNTER — OFFICE VISIT - HEALTHEAST (OUTPATIENT)
Dept: FAMILY MEDICINE | Facility: CLINIC | Age: 74
End: 2019-09-17

## 2019-09-17 DIAGNOSIS — Z01.810 PRE-OPERATIVE CARDIOVASCULAR EXAMINATION: ICD-10-CM

## 2019-09-17 DIAGNOSIS — I25.5 ISCHEMIC CARDIOMYOPATHY: ICD-10-CM

## 2019-09-17 DIAGNOSIS — K80.20 CALCULUS OF GALLBLADDER WITHOUT CHOLECYSTITIS WITHOUT OBSTRUCTION: ICD-10-CM

## 2019-09-17 DIAGNOSIS — Z23 NEED FOR VACCINATION: ICD-10-CM

## 2019-09-17 DIAGNOSIS — G47.00 INSOMNIA, UNSPECIFIED: ICD-10-CM

## 2019-09-17 DIAGNOSIS — I10 ESSENTIAL HYPERTENSION: ICD-10-CM

## 2019-09-17 DIAGNOSIS — I42.9 CARDIOMYOPATHY (H): ICD-10-CM

## 2019-09-17 DIAGNOSIS — G47.33 OSA ON CPAP: ICD-10-CM

## 2019-09-17 DIAGNOSIS — R73.01 IMPAIRED FASTING GLUCOSE: ICD-10-CM

## 2019-09-17 DIAGNOSIS — K21.9 GASTROESOPHAGEAL REFLUX DISEASE WITHOUT ESOPHAGITIS: ICD-10-CM

## 2019-09-17 DIAGNOSIS — I10 ESSENTIAL HYPERTENSION WITH GOAL BLOOD PRESSURE LESS THAN 130/80: ICD-10-CM

## 2019-09-17 DIAGNOSIS — I48.0 PAROXYSMAL ATRIAL FIBRILLATION (H): ICD-10-CM

## 2019-09-17 LAB
ANION GAP SERPL CALCULATED.3IONS-SCNC: 10 MMOL/L (ref 5–18)
ATRIAL RATE - MUSE: 60 BPM
BUN SERPL-MCNC: 12 MG/DL (ref 8–28)
CALCIUM SERPL-MCNC: 9.7 MG/DL (ref 8.5–10.5)
CHLORIDE BLD-SCNC: 108 MMOL/L (ref 98–107)
CO2 SERPL-SCNC: 22 MMOL/L (ref 22–31)
CREAT SERPL-MCNC: 1.15 MG/DL (ref 0.7–1.3)
DIASTOLIC BLOOD PRESSURE - MUSE: NORMAL
ERYTHROCYTE [DISTWIDTH] IN BLOOD BY AUTOMATED COUNT: 11.9 % (ref 11–14.5)
GFR SERPL CREATININE-BSD FRML MDRD: >60 ML/MIN/1.73M2
GLUCOSE BLD-MCNC: 99 MG/DL (ref 70–125)
HCT VFR BLD AUTO: 47.7 % (ref 40–54)
HGB BLD-MCNC: 16.1 G/DL (ref 14–18)
INTERPRETATION ECG - MUSE: NORMAL
MAGNESIUM SERPL-MCNC: 2 MG/DL (ref 1.8–2.6)
MCH RBC QN AUTO: 32 PG (ref 27–34)
MCHC RBC AUTO-ENTMCNC: 33.7 G/DL (ref 32–36)
MCV RBC AUTO: 95 FL (ref 80–100)
P AXIS - MUSE: NORMAL
PLATELET # BLD AUTO: 165 THOU/UL (ref 140–440)
PMV BLD AUTO: 8.5 FL (ref 7–10)
POTASSIUM BLD-SCNC: 4.2 MMOL/L (ref 3.5–5)
PR INTERVAL - MUSE: NORMAL
QRS DURATION - MUSE: 156 MS
QT - MUSE: 490 MS
QTC - MUSE: 490 MS
R AXIS - MUSE: 156 DEGREES
RBC # BLD AUTO: 5.02 MILL/UL (ref 4.4–6.2)
SODIUM SERPL-SCNC: 140 MMOL/L (ref 136–145)
SYSTOLIC BLOOD PRESSURE - MUSE: NORMAL
T AXIS - MUSE: 46 DEGREES
TSH SERPL DL<=0.005 MIU/L-ACNC: 1.94 UIU/ML (ref 0.3–5)
VENTRICULAR RATE- MUSE: 60 BPM
WBC: 6.3 THOU/UL (ref 4–11)

## 2019-09-17 ASSESSMENT — MIFFLIN-ST. JEOR: SCORE: 1857.56

## 2019-09-24 ENCOUNTER — COMMUNICATION - HEALTHEAST (OUTPATIENT)
Dept: SURGERY | Facility: CLINIC | Age: 74
End: 2019-09-24

## 2019-10-01 ENCOUNTER — COMMUNICATION - HEALTHEAST (OUTPATIENT)
Dept: SURGERY | Facility: CLINIC | Age: 74
End: 2019-10-01

## 2019-10-02 ENCOUNTER — ANESTHESIA - HEALTHEAST (OUTPATIENT)
Dept: SURGERY | Facility: HOSPITAL | Age: 74
End: 2019-10-02

## 2019-10-02 ENCOUNTER — AMBULATORY - HEALTHEAST (OUTPATIENT)
Dept: SURGERY | Facility: CLINIC | Age: 74
End: 2019-10-02

## 2019-10-02 ENCOUNTER — SURGERY - HEALTHEAST (OUTPATIENT)
Dept: SURGERY | Facility: HOSPITAL | Age: 74
End: 2019-10-02

## 2019-10-02 DIAGNOSIS — G89.4 CHRONIC PAIN SYNDROME: ICD-10-CM

## 2019-10-02 RX ORDER — TRAMADOL HYDROCHLORIDE 50 MG/1
50-100 TABLET ORAL EVERY 6 HOURS PRN
Qty: 15 TABLET | Refills: 1 | Status: SHIPPED | OUTPATIENT
Start: 2019-10-02 | End: 2024-07-12

## 2019-10-06 ENCOUNTER — COMMUNICATION - HEALTHEAST (OUTPATIENT)
Dept: SURGERY | Facility: CLINIC | Age: 74
End: 2019-10-06

## 2019-10-07 ENCOUNTER — COMMUNICATION - HEALTHEAST (OUTPATIENT)
Dept: SURGERY | Facility: CLINIC | Age: 74
End: 2019-10-07

## 2019-10-10 ENCOUNTER — COMMUNICATION - HEALTHEAST (OUTPATIENT)
Dept: SURGERY | Facility: CLINIC | Age: 74
End: 2019-10-10

## 2019-10-17 ENCOUNTER — COMMUNICATION - HEALTHEAST (OUTPATIENT)
Dept: FAMILY MEDICINE | Facility: CLINIC | Age: 74
End: 2019-10-17

## 2019-10-18 ENCOUNTER — COMMUNICATION - HEALTHEAST (OUTPATIENT)
Dept: FAMILY MEDICINE | Facility: CLINIC | Age: 74
End: 2019-10-18

## 2019-10-18 DIAGNOSIS — J30.9 AR (ALLERGIC RHINITIS): ICD-10-CM

## 2019-10-25 ENCOUNTER — OFFICE VISIT - HEALTHEAST (OUTPATIENT)
Dept: FAMILY MEDICINE | Facility: CLINIC | Age: 74
End: 2019-10-25

## 2019-10-25 DIAGNOSIS — I10 ESSENTIAL HYPERTENSION WITH GOAL BLOOD PRESSURE LESS THAN 130/80: ICD-10-CM

## 2019-10-25 DIAGNOSIS — I25.5 ISCHEMIC CARDIOMYOPATHY: ICD-10-CM

## 2019-10-25 DIAGNOSIS — I25.10 CORONARY ARTERY DISEASE INVOLVING NATIVE CORONARY ARTERY OF NATIVE HEART WITHOUT ANGINA PECTORIS: ICD-10-CM

## 2019-10-25 DIAGNOSIS — I48.0 PAROXYSMAL ATRIAL FIBRILLATION (H): ICD-10-CM

## 2019-10-25 DIAGNOSIS — R07.9 ACUTE CHEST PAIN: ICD-10-CM

## 2019-10-25 LAB
ALBUMIN SERPL-MCNC: 3.6 G/DL (ref 3.5–5)
ALP SERPL-CCNC: 433 U/L (ref 45–120)
ALT SERPL W P-5'-P-CCNC: 346 U/L (ref 0–45)
ANION GAP SERPL CALCULATED.3IONS-SCNC: 10 MMOL/L (ref 5–18)
AST SERPL W P-5'-P-CCNC: 276 U/L (ref 0–40)
ATRIAL RATE - MUSE: 64 BPM
BILIRUB SERPL-MCNC: 4.8 MG/DL (ref 0–1)
BUN SERPL-MCNC: 10 MG/DL (ref 8–28)
CALCIUM SERPL-MCNC: 9.3 MG/DL (ref 8.5–10.5)
CHLORIDE BLD-SCNC: 104 MMOL/L (ref 98–107)
CO2 SERPL-SCNC: 21 MMOL/L (ref 22–31)
CREAT SERPL-MCNC: 0.99 MG/DL (ref 0.7–1.3)
DIASTOLIC BLOOD PRESSURE - MUSE: NORMAL
ERYTHROCYTE [DISTWIDTH] IN BLOOD BY AUTOMATED COUNT: 12.8 % (ref 11–14.5)
GFR SERPL CREATININE-BSD FRML MDRD: >60 ML/MIN/1.73M2
GLUCOSE BLD-MCNC: 103 MG/DL (ref 70–125)
HCT VFR BLD AUTO: 44.2 % (ref 40–54)
HGB BLD-MCNC: 14.8 G/DL (ref 14–18)
INTERPRETATION ECG - MUSE: NORMAL
LIPASE SERPL-CCNC: 37 U/L (ref 0–52)
MCH RBC QN AUTO: 31.5 PG (ref 27–34)
MCHC RBC AUTO-ENTMCNC: 33.5 G/DL (ref 32–36)
MCV RBC AUTO: 94 FL (ref 80–100)
P AXIS - MUSE: 50 DEGREES
PLATELET # BLD AUTO: 196 THOU/UL (ref 140–440)
PMV BLD AUTO: 7.8 FL (ref 7–10)
POTASSIUM BLD-SCNC: 4.1 MMOL/L (ref 3.5–5)
PR INTERVAL - MUSE: 164 MS
PROT SERPL-MCNC: 6.7 G/DL (ref 6–8)
QRS DURATION - MUSE: 156 MS
QT - MUSE: 478 MS
QTC - MUSE: 493 MS
R AXIS - MUSE: 146 DEGREES
RBC # BLD AUTO: 4.69 MILL/UL (ref 4.4–6.2)
SODIUM SERPL-SCNC: 135 MMOL/L (ref 136–145)
SYSTOLIC BLOOD PRESSURE - MUSE: NORMAL
T AXIS - MUSE: 49 DEGREES
VENTRICULAR RATE- MUSE: 64 BPM
WBC: 6.8 THOU/UL (ref 4–11)

## 2019-10-25 RX ORDER — NITROGLYCERIN 0.4 MG/1
0.4 TABLET SUBLINGUAL EVERY 5 MIN PRN
Qty: 100 TABLET | Refills: 3 | Status: SHIPPED | OUTPATIENT
Start: 2019-10-25

## 2019-10-26 ENCOUNTER — COMMUNICATION - HEALTHEAST (OUTPATIENT)
Dept: FAMILY MEDICINE | Facility: CLINIC | Age: 74
End: 2019-10-26

## 2019-10-28 ENCOUNTER — HOSPITAL ENCOUNTER (OUTPATIENT)
Dept: CT IMAGING | Facility: HOSPITAL | Age: 74
Discharge: HOME | End: 2019-10-28
Attending: FAMILY MEDICINE

## 2019-10-28 ENCOUNTER — AMBULATORY - HEALTHEAST (OUTPATIENT)
Dept: FAMILY MEDICINE | Facility: CLINIC | Age: 74
End: 2019-10-28

## 2019-10-28 ENCOUNTER — COMMUNICATION - HEALTHEAST (OUTPATIENT)
Dept: FAMILY MEDICINE | Facility: CLINIC | Age: 74
End: 2019-10-28

## 2019-10-28 DIAGNOSIS — K75.9 HEPATITIS: ICD-10-CM

## 2019-10-28 DIAGNOSIS — R10.13 ABDOMINAL PAIN, EPIGASTRIC: ICD-10-CM

## 2019-10-29 ENCOUNTER — OFFICE VISIT - HEALTHEAST (OUTPATIENT)
Dept: FAMILY MEDICINE | Facility: CLINIC | Age: 74
End: 2019-10-29

## 2019-10-29 ENCOUNTER — RECORDS - HEALTHEAST (OUTPATIENT)
Dept: ADMINISTRATIVE | Facility: OTHER | Age: 74
End: 2019-10-29

## 2019-10-29 DIAGNOSIS — R10.13 ABDOMINAL PAIN, EPIGASTRIC: ICD-10-CM

## 2019-10-29 DIAGNOSIS — I25.10 CORONARY ARTERY DISEASE INVOLVING NATIVE CORONARY ARTERY OF NATIVE HEART WITHOUT ANGINA PECTORIS: ICD-10-CM

## 2019-10-29 DIAGNOSIS — K75.9 HEPATITIS: ICD-10-CM

## 2019-10-29 LAB
AMMONIA PLAS-SCNC: 39 UMOL/L (ref 11–35)
ERYTHROCYTE [DISTWIDTH] IN BLOOD BY AUTOMATED COUNT: 12.6 % (ref 11–14.5)
HCT VFR BLD AUTO: 41.5 % (ref 40–54)
HGB BLD-MCNC: 14.3 G/DL (ref 14–18)
INR PPP: 1.47 (ref 0.9–1.1)
MCH RBC QN AUTO: 32.5 PG (ref 27–34)
MCHC RBC AUTO-ENTMCNC: 34.5 G/DL (ref 32–36)
MCV RBC AUTO: 94 FL (ref 80–100)
PLATELET # BLD AUTO: 193 THOU/UL (ref 140–440)
PMV BLD AUTO: 7.8 FL (ref 7–10)
RBC # BLD AUTO: 4.4 MILL/UL (ref 4.4–6.2)
WBC: 5.4 THOU/UL (ref 4–11)

## 2019-10-30 LAB
ALBUMIN SERPL-MCNC: 3.8 G/DL (ref 3.5–5)
ALP SERPL-CCNC: 379 U/L (ref 45–120)
ALT SERPL W P-5'-P-CCNC: 344 U/L (ref 0–45)
ANION GAP SERPL CALCULATED.3IONS-SCNC: 10 MMOL/L (ref 5–18)
AST SERPL W P-5'-P-CCNC: 221 U/L (ref 0–40)
BILIRUB SERPL-MCNC: 2.1 MG/DL (ref 0–1)
BUN SERPL-MCNC: 12 MG/DL (ref 8–28)
CALCIUM SERPL-MCNC: 9.7 MG/DL (ref 8.5–10.5)
CHLORIDE BLD-SCNC: 107 MMOL/L (ref 98–107)
CO2 SERPL-SCNC: 23 MMOL/L (ref 22–31)
CREAT SERPL-MCNC: 1.15 MG/DL (ref 0.7–1.3)
GFR SERPL CREATININE-BSD FRML MDRD: >60 ML/MIN/1.73M2
GLUCOSE BLD-MCNC: 103 MG/DL (ref 70–125)
LIPASE SERPL-CCNC: 25 U/L (ref 0–52)
POTASSIUM BLD-SCNC: 4.6 MMOL/L (ref 3.5–5)
PROT SERPL-MCNC: 6.9 G/DL (ref 6–8)
SODIUM SERPL-SCNC: 140 MMOL/L (ref 136–145)

## 2019-11-08 ENCOUNTER — OFFICE VISIT - HEALTHEAST (OUTPATIENT)
Dept: FAMILY MEDICINE | Facility: CLINIC | Age: 74
End: 2019-11-08

## 2019-11-08 DIAGNOSIS — R10.13 ABDOMINAL PAIN, EPIGASTRIC: ICD-10-CM

## 2019-11-08 DIAGNOSIS — K75.9 HEPATITIS: ICD-10-CM

## 2019-11-08 DIAGNOSIS — R79.89 ABNORMAL LFTS: ICD-10-CM

## 2019-11-08 LAB
ALBUMIN SERPL-MCNC: 4.1 G/DL (ref 3.5–5)
ALP SERPL-CCNC: 165 U/L (ref 45–120)
ALT SERPL W P-5'-P-CCNC: 71 U/L (ref 0–45)
AMMONIA PLAS-SCNC: 34 UMOL/L (ref 11–35)
ANION GAP SERPL CALCULATED.3IONS-SCNC: 10 MMOL/L (ref 5–18)
AST SERPL W P-5'-P-CCNC: 39 U/L (ref 0–40)
BILIRUB SERPL-MCNC: 1.5 MG/DL (ref 0–1)
BUN SERPL-MCNC: 12 MG/DL (ref 8–28)
CALCIUM SERPL-MCNC: 9.6 MG/DL (ref 8.5–10.5)
CHLORIDE BLD-SCNC: 105 MMOL/L (ref 98–107)
CO2 SERPL-SCNC: 25 MMOL/L (ref 22–31)
CREAT SERPL-MCNC: 1.16 MG/DL (ref 0.7–1.3)
ERYTHROCYTE [DISTWIDTH] IN BLOOD BY AUTOMATED COUNT: 13.1 % (ref 11–14.5)
GFR SERPL CREATININE-BSD FRML MDRD: >60 ML/MIN/1.73M2
GGT SERPL-CCNC: 357 U/L (ref 0–50)
GLUCOSE BLD-MCNC: 103 MG/DL (ref 70–125)
HCT VFR BLD AUTO: 46 % (ref 40–54)
HGB BLD-MCNC: 15.3 G/DL (ref 14–18)
INR PPP: 0.96 (ref 0.9–1.1)
LIPASE SERPL-CCNC: 25 U/L (ref 0–52)
MCH RBC QN AUTO: 31.4 PG (ref 27–34)
MCHC RBC AUTO-ENTMCNC: 33.2 G/DL (ref 32–36)
MCV RBC AUTO: 95 FL (ref 80–100)
PLATELET # BLD AUTO: 213 THOU/UL (ref 140–440)
PMV BLD AUTO: 8.6 FL (ref 7–10)
POTASSIUM BLD-SCNC: 4.2 MMOL/L (ref 3.5–5)
PROT SERPL-MCNC: 7.2 G/DL (ref 6–8)
RBC # BLD AUTO: 4.86 MILL/UL (ref 4.4–6.2)
SODIUM SERPL-SCNC: 140 MMOL/L (ref 136–145)
WBC: 6 THOU/UL (ref 4–11)

## 2019-12-10 ENCOUNTER — AMBULATORY - HEALTHEAST (OUTPATIENT)
Dept: CARDIOLOGY | Facility: CLINIC | Age: 74
End: 2019-12-10

## 2019-12-10 DIAGNOSIS — I44.2 COMPLETE AV BLOCK, ACQUIRED (H): ICD-10-CM

## 2019-12-10 DIAGNOSIS — Z95.810 ICD (IMPLANTABLE CARDIOVERTER-DEFIBRILLATOR), BIVENTRICULAR, IN SITU: ICD-10-CM

## 2019-12-12 ENCOUNTER — OFFICE VISIT - HEALTHEAST (OUTPATIENT)
Dept: FAMILY MEDICINE | Facility: CLINIC | Age: 74
End: 2019-12-12

## 2019-12-12 DIAGNOSIS — E66.811 CLASS 1 OBESITY DUE TO EXCESS CALORIES WITH SERIOUS COMORBIDITY AND BODY MASS INDEX (BMI) OF 32.0 TO 32.9 IN ADULT: ICD-10-CM

## 2019-12-12 DIAGNOSIS — I25.10 CORONARY ARTERY DISEASE INVOLVING NATIVE CORONARY ARTERY OF NATIVE HEART WITHOUT ANGINA PECTORIS: ICD-10-CM

## 2019-12-12 DIAGNOSIS — R73.01 IMPAIRED FASTING GLUCOSE: ICD-10-CM

## 2019-12-12 DIAGNOSIS — R79.89 ABNORMAL LFTS: ICD-10-CM

## 2019-12-12 DIAGNOSIS — I10 ESSENTIAL HYPERTENSION WITH GOAL BLOOD PRESSURE LESS THAN 130/80: ICD-10-CM

## 2019-12-12 DIAGNOSIS — K21.9 GASTROESOPHAGEAL REFLUX DISEASE WITHOUT ESOPHAGITIS: ICD-10-CM

## 2019-12-12 DIAGNOSIS — G47.33 OSA ON CPAP: ICD-10-CM

## 2019-12-12 DIAGNOSIS — E66.09 CLASS 1 OBESITY DUE TO EXCESS CALORIES WITH SERIOUS COMORBIDITY AND BODY MASS INDEX (BMI) OF 32.0 TO 32.9 IN ADULT: ICD-10-CM

## 2019-12-12 DIAGNOSIS — F52.8 PSYCHOSEXUAL DYSFUNCTION WITH INHIBITED SEXUAL EXCITEMENT: ICD-10-CM

## 2019-12-12 DIAGNOSIS — R74.8 ELEVATED SERUM GGT LEVEL: ICD-10-CM

## 2019-12-12 DIAGNOSIS — Z00.01 ENCOUNTER FOR GENERAL ADULT MEDICAL EXAMINATION WITH ABNORMAL FINDINGS: ICD-10-CM

## 2019-12-12 DIAGNOSIS — I25.5 ISCHEMIC CARDIOMYOPATHY: ICD-10-CM

## 2019-12-12 DIAGNOSIS — I48.0 PAROXYSMAL ATRIAL FIBRILLATION (H): ICD-10-CM

## 2019-12-12 LAB
ALBUMIN SERPL-MCNC: 4.5 G/DL (ref 3.5–5)
ALP SERPL-CCNC: 83 U/L (ref 45–120)
ALT SERPL W P-5'-P-CCNC: 23 U/L (ref 0–45)
ANION GAP SERPL CALCULATED.3IONS-SCNC: 10 MMOL/L (ref 5–18)
AST SERPL W P-5'-P-CCNC: 23 U/L (ref 0–40)
BILIRUB SERPL-MCNC: 0.9 MG/DL (ref 0–1)
BUN SERPL-MCNC: 16 MG/DL (ref 8–28)
CALCIUM SERPL-MCNC: 9.9 MG/DL (ref 8.5–10.5)
CHLORIDE BLD-SCNC: 103 MMOL/L (ref 98–107)
CHOLEST SERPL-MCNC: 171 MG/DL
CO2 SERPL-SCNC: 26 MMOL/L (ref 22–31)
CREAT SERPL-MCNC: 1.12 MG/DL (ref 0.7–1.3)
ERYTHROCYTE [DISTWIDTH] IN BLOOD BY AUTOMATED COUNT: 13.1 % (ref 11–14.5)
FASTING STATUS PATIENT QL REPORTED: YES
GFR SERPL CREATININE-BSD FRML MDRD: >60 ML/MIN/1.73M2
GGT SERPL-CCNC: 52 U/L (ref 0–50)
GLUCOSE BLD-MCNC: 96 MG/DL (ref 70–125)
HBA1C MFR BLD: 5.3 % (ref 3.5–6)
HCT VFR BLD AUTO: 48.9 % (ref 40–54)
HDLC SERPL-MCNC: 55 MG/DL
HGB BLD-MCNC: 16.8 G/DL (ref 14–18)
LDLC SERPL CALC-MCNC: 90 MG/DL
MCH RBC QN AUTO: 32.5 PG (ref 27–34)
MCHC RBC AUTO-ENTMCNC: 34.3 G/DL (ref 32–36)
MCV RBC AUTO: 95 FL (ref 80–100)
PLATELET # BLD AUTO: 173 THOU/UL (ref 140–440)
PMV BLD AUTO: 7.9 FL (ref 7–10)
POTASSIUM BLD-SCNC: 4.2 MMOL/L (ref 3.5–5)
PROT SERPL-MCNC: 7.6 G/DL (ref 6–8)
RBC # BLD AUTO: 5.17 MILL/UL (ref 4.4–6.2)
SODIUM SERPL-SCNC: 139 MMOL/L (ref 136–145)
TRIGL SERPL-MCNC: 129 MG/DL
WBC: 7.3 THOU/UL (ref 4–11)

## 2019-12-12 ASSESSMENT — MIFFLIN-ST. JEOR: SCORE: 1780.45

## 2019-12-12 ASSESSMENT — ANXIETY QUESTIONNAIRES
2. NOT BEING ABLE TO STOP OR CONTROL WORRYING: NOT AT ALL
1. FEELING NERVOUS, ANXIOUS, OR ON EDGE: NOT AT ALL

## 2020-02-07 ENCOUNTER — OFFICE VISIT - HEALTHEAST (OUTPATIENT)
Dept: FAMILY MEDICINE | Facility: CLINIC | Age: 75
End: 2020-02-07

## 2020-02-07 ENCOUNTER — COMMUNICATION - HEALTHEAST (OUTPATIENT)
Dept: SCHEDULING | Facility: CLINIC | Age: 75
End: 2020-02-07
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
## 2020-03-10 ENCOUNTER — AMBULATORY - HEALTHEAST (OUTPATIENT)
Dept: CARDIOLOGY | Facility: CLINIC | Age: 75
End: 2020-03-10

## 2020-03-10 DIAGNOSIS — Z95.810 ICD (IMPLANTABLE CARDIOVERTER-DEFIBRILLATOR), BIVENTRICULAR, IN SITU: ICD-10-CM

## 2020-03-10 DIAGNOSIS — I44.2 COMPLETE AV BLOCK, ACQUIRED (H): ICD-10-CM

## 2020-03-16 ENCOUNTER — COMMUNICATION - HEALTHEAST (OUTPATIENT)
Dept: CARDIOLOGY | Facility: CLINIC | Age: 75
End: 2020-03-16

## 2020-03-16 DIAGNOSIS — I10 ESSENTIAL HYPERTENSION: ICD-10-CM

## 2020-03-16 DIAGNOSIS — I42.9 CARDIOMYOPATHY (H): ICD-10-CM

## 2020-04-16 ENCOUNTER — COMMUNICATION - HEALTHEAST (OUTPATIENT)
Dept: CARDIOLOGY | Facility: CLINIC | Age: 75
End: 2020-04-16

## 2020-04-16 DIAGNOSIS — E78.5 HYPERLIPIDEMIA: ICD-10-CM

## 2020-04-16 DIAGNOSIS — I51.9 LV DYSFUNCTION: ICD-10-CM

## 2020-04-16 DIAGNOSIS — I42.9 CARDIOMYOPATHY (H): ICD-10-CM

## 2020-04-16 RX ORDER — SIMVASTATIN 40 MG
TABLET ORAL
Qty: 90 TABLET | Refills: 3 | Status: SHIPPED | OUTPATIENT
Start: 2020-04-16 | End: 2021-07-16

## 2020-05-10 ENCOUNTER — SURGERY - HEALTHEAST (OUTPATIENT)
Dept: SURGERY | Facility: HOSPITAL | Age: 75
End: 2020-05-10

## 2020-05-10 ENCOUNTER — ANESTHESIA - HEALTHEAST (OUTPATIENT)
Dept: SURGERY | Facility: HOSPITAL | Age: 75
End: 2020-05-10

## 2020-05-10 ASSESSMENT — MIFFLIN-ST. JEOR
SCORE: 1850.76
SCORE: 1864.37

## 2020-05-12 ENCOUNTER — AMBULATORY - HEALTHEAST (OUTPATIENT)
Dept: CARE COORDINATION | Facility: CLINIC | Age: 75
End: 2020-05-12

## 2020-05-12 DIAGNOSIS — K80.20 CALCULUS OF GALLBLADDER: ICD-10-CM

## 2020-05-13 ENCOUNTER — COMMUNICATION - HEALTHEAST (OUTPATIENT)
Dept: FAMILY MEDICINE | Facility: CLINIC | Age: 75
End: 2020-05-13

## 2020-05-13 ENCOUNTER — COMMUNICATION - HEALTHEAST (OUTPATIENT)
Dept: NURSING | Facility: CLINIC | Age: 75
End: 2020-05-13

## 2020-05-15 ENCOUNTER — COMMUNICATION - HEALTHEAST (OUTPATIENT)
Dept: FAMILY MEDICINE | Facility: CLINIC | Age: 75
End: 2020-05-15

## 2020-05-20 ENCOUNTER — OFFICE VISIT - HEALTHEAST (OUTPATIENT)
Dept: FAMILY MEDICINE | Facility: CLINIC | Age: 75
End: 2020-05-20

## 2020-05-20 DIAGNOSIS — G47.00 INSOMNIA, UNSPECIFIED TYPE: ICD-10-CM

## 2020-05-20 DIAGNOSIS — I10 ESSENTIAL HYPERTENSION WITH GOAL BLOOD PRESSURE LESS THAN 130/80: ICD-10-CM

## 2020-05-20 DIAGNOSIS — K80.50 CHOLEDOCHOLITHIASIS: ICD-10-CM

## 2020-05-20 DIAGNOSIS — I25.10 CORONARY ARTERY DISEASE INVOLVING NATIVE CORONARY ARTERY OF NATIVE HEART WITHOUT ANGINA PECTORIS: ICD-10-CM

## 2020-05-20 DIAGNOSIS — G47.33 OSA ON CPAP: ICD-10-CM

## 2020-05-20 DIAGNOSIS — I25.5 ISCHEMIC CARDIOMYOPATHY: ICD-10-CM

## 2020-05-23 ENCOUNTER — COMMUNICATION - HEALTHEAST (OUTPATIENT)
Dept: FAMILY MEDICINE | Facility: CLINIC | Age: 75
End: 2020-05-23

## 2020-05-23 DIAGNOSIS — N52.9 ERECTILE DYSFUNCTION, UNSPECIFIED ERECTILE DYSFUNCTION TYPE: ICD-10-CM

## 2020-06-10 ENCOUNTER — AMBULATORY - HEALTHEAST (OUTPATIENT)
Dept: CARDIOLOGY | Facility: CLINIC | Age: 75
End: 2020-06-10

## 2020-06-10 DIAGNOSIS — I25.5 ISCHEMIC CARDIOMYOPATHY: ICD-10-CM

## 2020-06-10 DIAGNOSIS — Z95.810 ICD (IMPLANTABLE CARDIOVERTER-DEFIBRILLATOR), BIVENTRICULAR, IN SITU: ICD-10-CM

## 2020-06-11 ENCOUNTER — COMMUNICATION - HEALTHEAST (OUTPATIENT)
Dept: FAMILY MEDICINE | Facility: CLINIC | Age: 75
End: 2020-06-11

## 2020-06-11 DIAGNOSIS — G47.00 INSOMNIA, UNSPECIFIED: ICD-10-CM

## 2020-06-12 ENCOUNTER — OFFICE VISIT - HEALTHEAST (OUTPATIENT)
Dept: FAMILY MEDICINE | Facility: CLINIC | Age: 75
End: 2020-06-12

## 2020-06-12 DIAGNOSIS — I10 ESSENTIAL HYPERTENSION WITH GOAL BLOOD PRESSURE LESS THAN 130/80: ICD-10-CM

## 2020-06-12 DIAGNOSIS — I25.10 CORONARY ARTERY DISEASE INVOLVING NATIVE CORONARY ARTERY OF NATIVE HEART WITHOUT ANGINA PECTORIS: ICD-10-CM

## 2020-06-12 DIAGNOSIS — D64.9 NORMOCHROMIC NORMOCYTIC ANEMIA: ICD-10-CM

## 2020-06-12 DIAGNOSIS — N52.9 ERECTILE DYSFUNCTION, UNSPECIFIED ERECTILE DYSFUNCTION TYPE: ICD-10-CM

## 2020-06-12 DIAGNOSIS — K80.50 CHOLEDOCHOLITHIASIS: ICD-10-CM

## 2020-06-12 DIAGNOSIS — I25.5 ISCHEMIC CARDIOMYOPATHY: ICD-10-CM

## 2020-06-12 DIAGNOSIS — R79.89 ABNORMAL LFTS: ICD-10-CM

## 2020-06-12 RX ORDER — SILDENAFIL 50 MG/1
50 TABLET, FILM COATED ORAL PRN
Qty: 30 TABLET | Refills: 11 | Status: SHIPPED | OUTPATIENT
Start: 2020-06-12 | End: 2023-06-07

## 2020-06-19 ENCOUNTER — AMBULATORY - HEALTHEAST (OUTPATIENT)
Dept: LAB | Facility: CLINIC | Age: 75
End: 2020-06-19

## 2020-06-19 DIAGNOSIS — D64.9 NORMOCHROMIC NORMOCYTIC ANEMIA: ICD-10-CM

## 2020-06-19 DIAGNOSIS — R79.89 ABNORMAL LFTS: ICD-10-CM

## 2020-06-19 LAB
ALBUMIN SERPL-MCNC: 3.9 G/DL (ref 3.5–5)
ALP SERPL-CCNC: 75 U/L (ref 45–120)
ALT SERPL W P-5'-P-CCNC: 24 U/L (ref 0–45)
ANION GAP SERPL CALCULATED.3IONS-SCNC: 9 MMOL/L (ref 5–18)
AST SERPL W P-5'-P-CCNC: 21 U/L (ref 0–40)
BILIRUB SERPL-MCNC: 0.9 MG/DL (ref 0–1)
BUN SERPL-MCNC: 13 MG/DL (ref 8–28)
CALCIUM SERPL-MCNC: 9.1 MG/DL (ref 8.5–10.5)
CHLORIDE BLD-SCNC: 105 MMOL/L (ref 98–107)
CO2 SERPL-SCNC: 22 MMOL/L (ref 22–31)
CREAT SERPL-MCNC: 1.03 MG/DL (ref 0.7–1.3)
ERYTHROCYTE [DISTWIDTH] IN BLOOD BY AUTOMATED COUNT: 12.5 % (ref 11–14.5)
GFR SERPL CREATININE-BSD FRML MDRD: >60 ML/MIN/1.73M2
GLUCOSE BLD-MCNC: 93 MG/DL (ref 70–125)
HCT VFR BLD AUTO: 46.6 % (ref 40–54)
HGB BLD-MCNC: 15.8 G/DL (ref 14–18)
MCH RBC QN AUTO: 32.2 PG (ref 27–34)
MCHC RBC AUTO-ENTMCNC: 34 G/DL (ref 32–36)
MCV RBC AUTO: 95 FL (ref 80–100)
PLATELET # BLD AUTO: 152 THOU/UL (ref 140–440)
PMV BLD AUTO: 7.6 FL (ref 7–10)
POTASSIUM BLD-SCNC: 4.2 MMOL/L (ref 3.5–5)
PROT SERPL-MCNC: 6.8 G/DL (ref 6–8)
RBC # BLD AUTO: 4.92 MILL/UL (ref 4.4–6.2)
SODIUM SERPL-SCNC: 136 MMOL/L (ref 136–145)
WBC: 6.1 THOU/UL (ref 4–11)

## 2020-07-14 ENCOUNTER — COMMUNICATION - HEALTHEAST (OUTPATIENT)
Dept: ADMINISTRATIVE | Facility: CLINIC | Age: 75
End: 2020-07-14

## 2020-07-15 ENCOUNTER — COMMUNICATION - HEALTHEAST (OUTPATIENT)
Dept: FAMILY MEDICINE | Facility: CLINIC | Age: 75
End: 2020-07-15

## 2020-09-30 ENCOUNTER — COMMUNICATION - HEALTHEAST (OUTPATIENT)
Dept: FAMILY MEDICINE | Facility: CLINIC | Age: 75
End: 2020-09-30

## 2020-09-30 DIAGNOSIS — K21.9 GERD (GASTROESOPHAGEAL REFLUX DISEASE): ICD-10-CM

## 2020-10-27 ENCOUNTER — COMMUNICATION - HEALTHEAST (OUTPATIENT)
Dept: FAMILY MEDICINE | Facility: CLINIC | Age: 75
End: 2020-10-27

## 2020-10-27 DIAGNOSIS — N52.9 ERECTILE DYSFUNCTION, UNSPECIFIED ERECTILE DYSFUNCTION TYPE: ICD-10-CM

## 2020-10-27 DIAGNOSIS — F52.8 PSYCHOSEXUAL DYSFUNCTION WITH INHIBITED SEXUAL EXCITEMENT: ICD-10-CM

## 2020-10-27 RX ORDER — TADALAFIL 10 MG/1
10 TABLET ORAL DAILY PRN
Qty: 30 TABLET | Refills: 11 | Status: SHIPPED | OUTPATIENT
Start: 2020-10-27 | End: 2022-06-29

## 2020-10-28 ENCOUNTER — AMBULATORY - HEALTHEAST (OUTPATIENT)
Dept: CARDIOLOGY | Facility: CLINIC | Age: 75
End: 2020-10-28

## 2020-10-28 DIAGNOSIS — I25.5 ISCHEMIC CARDIOMYOPATHY: ICD-10-CM

## 2020-10-28 DIAGNOSIS — Z95.810 ICD (IMPLANTABLE CARDIOVERTER-DEFIBRILLATOR), BIVENTRICULAR, IN SITU: ICD-10-CM

## 2020-11-09 ENCOUNTER — COMMUNICATION - HEALTHEAST (OUTPATIENT)
Dept: FAMILY MEDICINE | Facility: CLINIC | Age: 75
End: 2020-11-09

## 2020-11-09 DIAGNOSIS — M10.9 ACUTE GOUTY ARTHRITIS: ICD-10-CM

## 2020-12-15 ENCOUNTER — OFFICE VISIT - HEALTHEAST (OUTPATIENT)
Dept: FAMILY MEDICINE | Facility: CLINIC | Age: 75
End: 2020-12-15

## 2020-12-15 DIAGNOSIS — R49.0 HOARSENESS: ICD-10-CM

## 2020-12-15 DIAGNOSIS — I10 BENIGN ESSENTIAL HYPERTENSION: ICD-10-CM

## 2020-12-15 DIAGNOSIS — E78.5 HYPERLIPIDEMIA LDL GOAL <100: ICD-10-CM

## 2020-12-15 DIAGNOSIS — Z00.01 ENCOUNTER FOR GENERAL ADULT MEDICAL EXAMINATION WITH ABNORMAL FINDINGS: ICD-10-CM

## 2020-12-15 DIAGNOSIS — R10.32 LLQ ABDOMINAL PAIN: ICD-10-CM

## 2020-12-15 DIAGNOSIS — E66.01 MORBID OBESITY (H): ICD-10-CM

## 2020-12-15 DIAGNOSIS — Z95.810 ICD (IMPLANTABLE CARDIOVERTER-DEFIBRILLATOR), BIVENTRICULAR, IN SITU: ICD-10-CM

## 2020-12-15 DIAGNOSIS — I47.29 NSVT (NONSUSTAINED VENTRICULAR TACHYCARDIA) (H): ICD-10-CM

## 2020-12-15 DIAGNOSIS — D49.1 NEOPLASM OF RESPIRATORY SYSTEM: ICD-10-CM

## 2020-12-15 DIAGNOSIS — R73.01 IMPAIRED FASTING GLUCOSE: ICD-10-CM

## 2020-12-15 DIAGNOSIS — D12.6 BENIGN NEOPLASM OF COLON, UNSPECIFIED PART OF COLON: ICD-10-CM

## 2020-12-15 LAB
ALBUMIN SERPL-MCNC: 3.9 G/DL (ref 3.5–5)
ALP SERPL-CCNC: 64 U/L (ref 45–120)
ALT SERPL W P-5'-P-CCNC: 22 U/L (ref 0–45)
ANION GAP SERPL CALCULATED.3IONS-SCNC: 8 MMOL/L (ref 5–18)
AST SERPL W P-5'-P-CCNC: 18 U/L (ref 0–40)
BILIRUB SERPL-MCNC: 0.4 MG/DL (ref 0–1)
BUN SERPL-MCNC: 15 MG/DL (ref 8–28)
CALCIUM SERPL-MCNC: 8.8 MG/DL (ref 8.5–10.5)
CHLORIDE BLD-SCNC: 106 MMOL/L (ref 98–107)
CHOLEST SERPL-MCNC: 160 MG/DL
CO2 SERPL-SCNC: 25 MMOL/L (ref 22–31)
CREAT SERPL-MCNC: 1.08 MG/DL (ref 0.7–1.3)
ERYTHROCYTE [DISTWIDTH] IN BLOOD BY AUTOMATED COUNT: 12.4 % (ref 11–14.5)
FASTING STATUS PATIENT QL REPORTED: NO
GFR SERPL CREATININE-BSD FRML MDRD: >60 ML/MIN/1.73M2
GLUCOSE BLD-MCNC: 98 MG/DL (ref 70–125)
HBA1C MFR BLD: 5.6 %
HCT VFR BLD AUTO: 45.1 % (ref 40–54)
HDLC SERPL-MCNC: 54 MG/DL
HGB BLD-MCNC: 15.2 G/DL (ref 14–18)
LDLC SERPL CALC-MCNC: 79 MG/DL
MCH RBC QN AUTO: 31.8 PG (ref 27–34)
MCHC RBC AUTO-ENTMCNC: 33.7 G/DL (ref 32–36)
MCV RBC AUTO: 94 FL (ref 80–100)
PLATELET # BLD AUTO: 157 THOU/UL (ref 140–440)
PMV BLD AUTO: 10.3 FL (ref 8.5–12.5)
POTASSIUM BLD-SCNC: 4.6 MMOL/L (ref 3.5–5)
PROT SERPL-MCNC: 7 G/DL (ref 6–8)
RBC # BLD AUTO: 4.78 MILL/UL (ref 4.4–6.2)
SODIUM SERPL-SCNC: 139 MMOL/L (ref 136–145)
TRIGL SERPL-MCNC: 137 MG/DL
WBC: 6.6 THOU/UL (ref 4–11)

## 2020-12-15 ASSESSMENT — ANXIETY QUESTIONNAIRES
1. FEELING NERVOUS, ANXIOUS, OR ON EDGE: NOT AT ALL
2. NOT BEING ABLE TO STOP OR CONTROL WORRYING: NOT AT ALL

## 2020-12-15 ASSESSMENT — MIFFLIN-ST. JEOR: SCORE: 1883.65

## 2021-01-19 ENCOUNTER — COMMUNICATION - HEALTHEAST (OUTPATIENT)
Dept: FAMILY MEDICINE | Facility: CLINIC | Age: 76
End: 2021-01-19

## 2021-01-27 ENCOUNTER — AMBULATORY - HEALTHEAST (OUTPATIENT)
Dept: CARDIOLOGY | Facility: CLINIC | Age: 76
End: 2021-01-27

## 2021-01-27 DIAGNOSIS — Z95.810 ICD (IMPLANTABLE CARDIOVERTER-DEFIBRILLATOR), BIVENTRICULAR, IN SITU: ICD-10-CM

## 2021-01-27 DIAGNOSIS — I25.5 ISCHEMIC CARDIOMYOPATHY: ICD-10-CM

## 2021-02-21 ENCOUNTER — COMMUNICATION - HEALTHEAST (OUTPATIENT)
Dept: CARDIOLOGY | Facility: CLINIC | Age: 76
End: 2021-02-21

## 2021-02-21 DIAGNOSIS — I10 ESSENTIAL HYPERTENSION: ICD-10-CM

## 2021-02-21 DIAGNOSIS — I42.9 CARDIOMYOPATHY (H): ICD-10-CM

## 2021-02-24 ENCOUNTER — COMMUNICATION - HEALTHEAST (OUTPATIENT)
Dept: FAMILY MEDICINE | Facility: CLINIC | Age: 76
End: 2021-02-24

## 2021-02-24 DIAGNOSIS — I42.9 CARDIOMYOPATHY (H): ICD-10-CM

## 2021-02-24 DIAGNOSIS — I10 ESSENTIAL HYPERTENSION: ICD-10-CM

## 2021-02-24 RX ORDER — LISINOPRIL 5 MG/1
5 TABLET ORAL DAILY
Qty: 90 TABLET | Refills: 3 | Status: SHIPPED | OUTPATIENT
Start: 2021-02-24 | End: 2021-12-01

## 2021-03-08 ENCOUNTER — COMMUNICATION - HEALTHEAST (OUTPATIENT)
Dept: CARDIOLOGY | Facility: CLINIC | Age: 76
End: 2021-03-08

## 2021-03-08 ENCOUNTER — COMMUNICATION - HEALTHEAST (OUTPATIENT)
Dept: FAMILY MEDICINE | Facility: CLINIC | Age: 76
End: 2021-03-08

## 2021-03-08 DIAGNOSIS — J30.9 AR (ALLERGIC RHINITIS): ICD-10-CM

## 2021-03-08 DIAGNOSIS — I42.9 CARDIOMYOPATHY (H): ICD-10-CM

## 2021-03-09 ENCOUNTER — AMBULATORY - HEALTHEAST (OUTPATIENT)
Dept: NURSING | Facility: CLINIC | Age: 76
End: 2021-03-09

## 2021-03-09 RX ORDER — METOPROLOL SUCCINATE 50 MG/1
TABLET, EXTENDED RELEASE ORAL
Qty: 90 TABLET | Refills: 3 | Status: SHIPPED | OUTPATIENT
Start: 2021-03-09 | End: 2022-02-07

## 2021-03-09 RX ORDER — METOPROLOL SUCCINATE 100 MG/1
TABLET, EXTENDED RELEASE ORAL
Qty: 90 TABLET | Refills: 3 | Status: SHIPPED | OUTPATIENT
Start: 2021-03-09 | End: 2022-02-07

## 2021-03-30 ENCOUNTER — AMBULATORY - HEALTHEAST (OUTPATIENT)
Dept: NURSING | Facility: CLINIC | Age: 76
End: 2021-03-30

## 2021-04-28 ENCOUNTER — AMBULATORY - HEALTHEAST (OUTPATIENT)
Dept: CARDIOLOGY | Facility: CLINIC | Age: 76
End: 2021-04-28

## 2021-04-28 DIAGNOSIS — I25.5 ISCHEMIC CARDIOMYOPATHY: ICD-10-CM

## 2021-04-28 DIAGNOSIS — Z95.810 ICD (IMPLANTABLE CARDIOVERTER-DEFIBRILLATOR), BIVENTRICULAR, IN SITU: ICD-10-CM

## 2021-05-10 ENCOUNTER — COMMUNICATION - HEALTHEAST (OUTPATIENT)
Dept: FAMILY MEDICINE | Facility: CLINIC | Age: 76
End: 2021-05-10

## 2021-05-10 DIAGNOSIS — G47.00 INSOMNIA, UNSPECIFIED: ICD-10-CM

## 2021-05-11 RX ORDER — TRAZODONE HYDROCHLORIDE 50 MG/1
TABLET, FILM COATED ORAL
Qty: 90 TABLET | Refills: 1 | Status: SHIPPED | OUTPATIENT
Start: 2021-05-11 | End: 2021-09-29

## 2021-05-16 ENCOUNTER — AMBULATORY - HEALTHEAST (OUTPATIENT)
Dept: GASTROENTEROLOGY | Facility: HOSPITAL | Age: 76
End: 2021-05-16

## 2021-05-16 DIAGNOSIS — Z11.59 ENCOUNTER FOR SCREENING FOR OTHER VIRAL DISEASES: ICD-10-CM

## 2021-05-26 ENCOUNTER — RECORDS - HEALTHEAST (OUTPATIENT)
Dept: ADMINISTRATIVE | Facility: CLINIC | Age: 76
End: 2021-05-26

## 2021-05-27 NOTE — PROGRESS NOTES
"Assessment/Plan:    1. ROMELIA on CPAP  Discussed diagnosis of sleep apnea with prior sleep study in 2012 at [facility].  Patient has been using CPAP machine nightly for roughly 8 hours each night.  His obstructive sleep apnea benefits from use of the CPAP machine.  I recommend that patient continue with CPAP as prescribed.  Will provide letter today indicating the necessity.    2. Calculus of gallbladder without cholecystitis without obstruction  Discussed recent episode of abdominal pain, most likely related to gallstones.  Recent abdominal ultrasound verified presence of gallstones without any indication of obstruction or cholecystitis.  Symptoms have since resolved.  Patient continues on a low-carb diet but has worked to decrease fat intake as well.  We will continue to monitor.    3. Hypertension  Blood pressure remains well controlled on metoprolol.      Subjective:    Víctor Calderon is a 73-year-old male seen today for medication check. Past medical history includes hypercholesterolemia, obstructive sleep apnea, impaired fasting glucose, hypertension, CAD, reflux, cholelithiasis.  He presents today to discuss obtaining a new CPAP machine.  Reports that machine is giving a message of \"motor life exceeded\".  He has been using the machine every night for the past 7 years and needs it in order to have adequate sleep.  He believes that sleep study was done in 2012 at the [place] [facility] which has since been acquired by [facility].  Sleep study indicated sleep apnea.  Patient has been using the CPAP machine nightly for roughly 8 hours.  He notes improvement of sleep and decrease in daytime sleepiness with use of CPAP machine.      He otherwise feels well overall today.  Does recall recent gallbladder attack after eating high fat diet.  He had an abdominal ultrasound done at that time which indicated the presence of gallstones but no indication of obstruction or " "cholecystitis.  He has since made modifications to his diet and symptoms have resolved.  Finding of gallstones was not no finding, patient has known about this for several years.  This was the first episode of abdominal discomfort and does not have any continued issues in regards to this.  Patient has a history of hypertension.  Blood pressure remains well controlled on the current dose of metoprolol.  History of hyperlipidemia, managed with simvastatin 40 mg daily.  He denies having any chest pain, shortness of breath or other concerning symptoms.  He is not of any additional concerns that he would like addressed today. Review of systems is as stated in HPI, and the remainder of the 10 system review is otherwise unremarkable.    Past Medical History, Family History, and Social History reviewed.    Past Surgical History:   Procedure Laterality Date     CARDIAC CATHETERIZATION       COLONOSCOPY N/A 9/30/2015    Procedure: COLONOSCOPY;  Surgeon: Rafiq Gu MD;  Location: [facility];  Service:      HERNIA REPAIR      inguinal      INSERT / REPLACE / REMOVE PACEMAKER       polyp  2010    removed during colonoscopy     [place] REMOVAL OF SPERM DUCT(S)      Description: Surgery Of Male Genitalia Vasectomy;  Recorded: 02/22/2008;     KS REMOVE TONSILS/ADENOIDS,<11 Y/O      Description: Tonsillectomy With Adenoidectomy;  Recorded: 02/22/2008;     TUMOR REMOVAL  2008    right vocal cord -         Family History   Problem Relation Age of Onset     Pneumonia Mother      Hypertension Mother      Cancer Father      COPD Sister      Multiple sclerosis Sister      Acute Myocardial Infarction Neg Hx         Past Medical History:   Diagnosis Date     Cholelithiasis      Coronary artery disease      Dermatitis      GERD (gastroesophageal reflux disease)      H/O cardiac arrest 2/2015    alka/torsades     Hyperlipidemia      Hypertension      Hypogonadism male      Left eye complaint     \"high pressure\"     Male erectile " disorder      Obesity      Sleep apnea      Ventricular tachycardia (H)     seen on pacer interrogation        Social History     Tobacco Use     Smoking status: Former Smoker     Types: Cigarettes     Last attempt to quit: 6/1/2008     Years since quitting: 10.8     Smokeless tobacco: Never Used   Substance Use Topics     Alcohol use: No     Drug use: No        Current Outpatient Medications   Medication Sig Dispense Refill     aspirin 81 MG EC tablet Take 81 mg by mouth daily.       calcium carbonate (CALCIUM CARBONATE) 300 mg (750 mg) Chew Chew as needed.        coenzyme Q10 (CO Q-10) 200 mg capsule Take 200 mg by mouth 2 (two) times a day.       DIPHENHYDRAMINE HCL (BENADRYL ALLERGY ORAL) Take by mouth as needed.       lisinopril (PRINIVIL,ZESTRIL) 10 MG tablet TAKE ONE-HALF (1/2) TABLET DAILY EVERY MORNING 45 tablet 2     metoprolol succinate (TOPROL-XL) 100 MG 24 hr tablet TAKE 1 TABLET AT BEDTIME 90 tablet 2     metoprolol succinate (TOPROL-XL) 50 MG 24 hr tablet TAKE 1 TABLET PLUS 1 TABLET  MG DAILY FOR TOTAL DOSE  MG 90 tablet 2     NITROSTAT 0.4 mg SL tablet Place 0.4 mg under the tongue every 5 (five) minutes as needed.        omeprazole (PRILOSEC) 20 MG capsule Take 1 capsule (20 mg total) by mouth as needed. 90 capsule 3     psyllium with dextrose (METAMUCIL JAR) powder Take by mouth Daily after lunch.       sildenafil (VIAGRA) 100 MG tablet Take 0.5 tablets (50 mg total) by mouth daily as needed for erectile dysfunction. 30 tablet 5     simvastatin (ZOCOR) 40 MG tablet TAKE 1 TABLET AT BEDTIME 90 tablet 2     traMADol (ULTRAM) 50 mg tablet Take 1-2 tablets ( mg total) by mouth every 6 (six) hours as needed for pain. Take 1-2 tablets every 6 hours as needed for pain 40 tablet 1     traZODone (DESYREL) 100 MG tablet TAKE ONE-HALF (1/2) TABLET AT BEDTIME 45 tablet 3     No current facility-administered medications for this visit.           Objective:    Vitals:    04/09/19 1500   BP:  "122/62   Patient Site: Right Arm   Patient Position: Sitting   Cuff Size: Adult Regular   Pulse: 78   Weight: (!) 233 lb (105.7 kg)   Height: 5' 11\" (1.803 m)      Body mass index is 32.5 kg/m .        General Appearance:  Alert, cooperative, no distress, appears stated age   HEENT:  Normal.  No acute findings.   Lungs:   Clear to auscultation bilaterally, respirations unlabored.  No expiratory wheeze or inspiratory crackles noted.   Heart:  Regular rate and rhythm, S1, S2 normal, no murmur, rub or gallop   Skin: Warm, dry.  No rashes or lesions       This note has been dictated using voice recognition software. Any grammatical or context distortions are unintentional and inherent to the use of this software.     "

## 2021-05-28 NOTE — TELEPHONE ENCOUNTER
Form received  Completed by Ritika Kovacs CNP who saw patient for a face to face visit regarding a new CPAP machine and supplies.    Form faxed to [phone]

## 2021-05-28 NOTE — TELEPHONE ENCOUNTER
Name of form/paperwork: Other:  Order form for CPAP machine  Have you been seen for this request: Yes:  4/9/19  Do we have the form: Yes- patient stated [facility] stated they faxed over a from on 4/17/19. [facility] phone:  Vicki - [phone]  When is form needed by: as soon as possible   How would you like the form returned: fax  Fax Number: see the fax number on the form  Patient Notified form requests are processed in 3-5 business days: No  (If patient needs form sooner, please note that in this message.)  Okay to leave a detailed message? Yes  [phone]    Patient stated [facility] is requiring a Rx for the CPAP machine and for their form to be filled out. Please reach out to Vicki from [facility] and update the patient.

## 2021-05-28 NOTE — TELEPHONE ENCOUNTER
Who is calling:  Patient   Reason for Call:  Patient is returning a call to clinic with information regarding his CPAP. He expressed to call [facility] [phone] or [phone] for a prescription for his CPAP machine.    Okay to leave a detailed message: Yes

## 2021-05-28 NOTE — TELEPHONE ENCOUNTER
Refill Approved    Rx renewed per Medication Renewal Policy. Medication was last renewed on 1/22/2018 with 3 refills. Change in pharmacy noted.  Last office visit: 4/9/2019 with MARTHA Kovacs CNP @ [facility].    Hortencia Manriquez, [facility] Triage/Med Refill 5/19/2019     Requested Prescriptions   Pending Prescriptions Disp Refills     omeprazole (PRILOSEC) 20 MG capsule 90 capsule 3     Sig: Take 1 capsule (20 mg total) by mouth as needed.       GI Medications Refill Protocol Passed - 5/19/2019  9:40 PM        Passed - PCP or prescribing provider visit in last 12 or next 3 months.     Last office visit with prescriber/PCP: 6/12/2018 Adams Garcia MD OR same dept: 4/9/2019 Ritika Kovacs CNP OR same specialty: 4/9/2019 Ritika Kovacs CNP  Last physical: 12/12/2018 Last MTM visit: Visit date not found   Next visit within 3 mo: Visit date not found  Next physical within 3 mo: Visit date not found  Prescriber OR PCP: Adams Garcia MD  Last diagnosis associated with med order: 1. GERD (gastroesophageal reflux disease)  - omeprazole (PRILOSEC) 20 MG capsule; Take 1 capsule (20 mg total) by mouth as needed.  Dispense: 90 capsule; Refill: 3    If protocol passes may refill for 12 months if within 3 months of last provider visit (or a total of 15 months).

## 2021-05-28 NOTE — TELEPHONE ENCOUNTER
Called [facility].  A new order will be faxed to me directly (fax: [phone])

## 2021-05-29 NOTE — PROGRESS NOTES
Assessment/Plan:    1. Essential hypertension with goal blood pressure less than 130/80  Hypertension, stable with blood pressure improved on recheck 128/74.  Reassess at annual wellness visit in 6 months.  Continues lisinopril 10 mg using half tablet daily metoprolol succinate 150 mg daily.  - Basic Metabolic Panel    2. Impaired fasting glucose  History of impaired fasting glucose.  A1c pending.  Fasting glucose pending.  - Basic Metabolic Panel  - Glycosylated Hemoglobin A1c    3. Ischemic cardiomyopathy  History of ischemic cardiomyopathy with prior EF 51% on echocardiogram August 23, 2018 and will follow-up with Dr. Frost August 2019.    4. Male Erectile Disorder  Printed prescription for Cialis per patient request.  Patient feels that he will be able to get prescription filled at less expensive price on his own.  Not requiring nitrates etc.  Will avoid with Cialis use.  - tadalafil (CIALIS) 20 MG tablet; Take 0.5 tablets (10 mg total) by mouth daily as needed for erectile dysfunction.  Dispense: 20 tablet; Refill: 5    5. Need for vaccination  Tetanus booster provided.  - Td, Preservative Free (green label)      The following high BMI interventions were performed this visit: encouragement to exercise, weight monitoring, weight loss from baseline weight and lifestyle education regarding diet.  Ensure ongoing efforts to achieve weight goal < 220 pounds initially, < 200 pounds ideally.         Subjective:    Víctor Calderon is seen today for follow-up evaluation.  Annual wellness visit December 12, 2018.  13 pound weight gain since that time and has been less active however hopes to resume more active lifestyle the summer.  A1c had improved historically from 6.1% down to 5.7% most recently.  Fasting glucose was 89 at prior physical.  Continues lisinopril 10 mg using half tablet daily plus metoprolol succinate 150 mg daily.  History of mild ischemic cardiomyopathy with improvement with prior EF 51% August 23,  "2018.  Was to follow-up with Dr. Sarmiento 2019.  Simvastatin 40 mg for cholesterol management.  Would like printed prescription for Cialis which he feels he will be able to get filled at a lower price on his own.  Not requiring sublingual nitrates etc.  No other refills required at this time.  Comprehensive review of systems as above otherwise all negative.     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 left s/p T & A as child; dual chamber pacemaker placement on 2/9/15 after cardiac arrest.  Quit EtOH 02   ROMELIA on CPAP at 8 cm H2O  PHQ-9 score= (9/22/10)     Past Surgical History:   Procedure Laterality Date     CARDIAC CATHETERIZATION       COLONOSCOPY N/A 2015    Procedure: COLONOSCOPY;  Surgeon: Rafiq Gu MD;  Location: [facility];  Service:      HERNIA REPAIR      inguinal      INSERT / REPLACE / REMOVE PACEMAKER       polyp      removed during colonoscopy     [place] REMOVAL OF SPERM DUCT(S)      Description: Surgery Of Male Genitalia Vasectomy;  Recorded: 2008;     [place] REMOVE TONSILS/ADENOIDS,<11 Y/O      Description: Tonsillectomy With Adenoidectomy;  Recorded: 2008;     TUMOR REMOVAL      right vocal cord -         Family History   Problem Relation Age of Onset     Pneumonia Mother      Hypertension Mother      Cancer Father      COPD Sister      Multiple sclerosis Sister      Acute Myocardial Infarction Neg Hx         Past Medical History:   Diagnosis Date     Cholelithiasis      Coronary artery disease      Dermatitis      GERD " "(gastroesophageal reflux disease)      H/O cardiac arrest 2015    alka/torsades     Hyperlipidemia      Hypertension      Hypogonadism male      Left eye complaint     \"high pressure\"     Male erectile disorder      Obesity      Sleep apnea      Ventricular tachycardia (H)     seen on pacer interrogation        Social History     Tobacco Use     Smoking status: Former Smoker     Types: Cigarettes     Last attempt to quit: 2008     Years since quittin.0     Smokeless tobacco: Never Used   Substance Use Topics     Alcohol use: No     Drug use: No        Current Outpatient Medications   Medication Sig Dispense Refill     aspirin 81 MG EC tablet Take 81 mg by mouth daily.       calcium carbonate (CALCIUM CARBONATE) 300 mg (750 mg) Chew Chew as needed.        coenzyme Q10 (CO Q-10) 200 mg capsule Take 200 mg by mouth 2 (two) times a day.       DIPHENHYDRAMINE HCL (BENADRYL ALLERGY ORAL) Take by mouth as needed.       lisinopril (PRINIVIL,ZESTRIL) 10 MG tablet TAKE ONE-HALF (1/2) TABLET DAILY EVERY MORNING 45 tablet 2     metoprolol succinate (TOPROL-XL) 100 MG 24 hr tablet TAKE 1 TABLET AT BEDTIME 90 tablet 2     metoprolol succinate (TOPROL-XL) 50 MG 24 hr tablet TAKE 1 TABLET PLUS 1 TABLET  MG DAILY FOR TOTAL DOSE  MG 90 tablet 2     NITROSTAT 0.4 mg SL tablet Place 0.4 mg under the tongue every 5 (five) minutes as needed.        omeprazole (PRILOSEC) 20 MG capsule Take 1 capsule (20 mg total) by mouth as needed. 90 capsule 3     psyllium with dextrose (METAMUCIL JAR) powder Take by mouth Daily after lunch.       sildenafil (VIAGRA) 100 MG tablet Take 0.5 tablets (50 mg total) by mouth daily as needed for erectile dysfunction. 30 tablet 5     simvastatin (ZOCOR) 40 MG tablet TAKE 1 TABLET AT BEDTIME 90 tablet 2     tadalafil (CIALIS) 20 MG tablet Take 0.5 tablets (10 mg total) by mouth daily as needed for erectile dysfunction. 20 tablet 5     traMADol (ULTRAM) 50 mg tablet Take 1-2 tablets " ( mg total) by mouth every 6 (six) hours as needed for pain. Take 1-2 tablets every 6 hours as needed for pain 40 tablet 1     traZODone (DESYREL) 100 MG tablet TAKE ONE-HALF (1/2) TABLET AT BEDTIME 45 tablet 3     No current facility-administered medications for this visit.           Objective:    Vitals:    06/12/19 1148 06/12/19 1149 06/12/19 1230   BP: 160/90 140/80 128/74   Pulse: 94     SpO2: 95%     Weight: (!) 235 lb (106.6 kg)        Body mass index is 32.78 kg/m .    Alert.  No apparent distress.  Blood pressure 128/74 on recheck.  Chest clear.  Cardiac exam regular.  Extremities warm and dry.      This note has been dictated using voice recognition software and as a result may contain minor grammatical errors and unintended word substitutions.

## 2021-05-30 VITALS — BODY MASS INDEX: 33.64 KG/M2 | WEIGHT: 235 LBS | HEIGHT: 70 IN

## 2021-05-31 VITALS — HEIGHT: 71 IN | WEIGHT: 239 LBS | BODY MASS INDEX: 33.46 KG/M2

## 2021-05-31 VITALS — BODY MASS INDEX: 33.91 KG/M2 | WEIGHT: 238 LBS

## 2021-06-01 VITALS — HEIGHT: 71 IN | BODY MASS INDEX: 32.62 KG/M2 | WEIGHT: 233 LBS

## 2021-06-01 VITALS — WEIGHT: 233 LBS | BODY MASS INDEX: 32.96 KG/M2

## 2021-06-01 VITALS — WEIGHT: 224 LBS | BODY MASS INDEX: 31.69 KG/M2

## 2021-06-01 NOTE — PROGRESS NOTES
"HPI: Víctor Calderon is a 74 y.o. male referred to see me by Adams Garcia MD for right upper quadrant pain.  He notes  a several month history of right upper quadrant pain occurring more frequently, but still no more frequent than once a month.  Notes that it differs from his typical gastroesophageal reflux, and that the pain persists despite use of antacid medication.  States the pain is epigastric in nature, and will radiate in a bandlike distribution across his upper abdomen more to the right side and the left.  Denies any exacerbation with physical activity.  Does not think that there is any associated nausea.  Most recent episode was several weeks ago prompting his evaluation with us today.   He denies any nausea, vomiting, fevers, chills, juandice or any other symptoms at present.       Allergies:Patient has no known allergies.    Past Medical History:   Diagnosis Date     Cholelithiasis      Coronary artery disease      Dermatitis      GERD (gastroesophageal reflux disease)      H/O cardiac arrest 2/2015    alka/torsades     Hyperlipidemia      Hypertension      Hypogonadism male      Left eye complaint     \"high pressure\"     Male erectile disorder      Obesity      Sleep apnea      Ventricular tachycardia (H)     seen on pacer interrogation       Past Surgical History:   Procedure Laterality Date     CARDIAC CATHETERIZATION       COLONOSCOPY N/A 9/30/2015    Procedure: COLONOSCOPY;  Surgeon: Rafiq Gu MD;  Location: [facility];  Service:      HERNIA REPAIR      inguinal      INSERT / REPLACE / REMOVE PACEMAKER       polyp  2010    removed during colonoscopy     [place] REMOVAL OF SPERM DUCT(S)      Description: Surgery Of Male Genitalia Vasectomy;  Recorded: 02/22/2008;     [place] REMOVE TONSILS/ADENOIDS,<11 Y/O      Description: Tonsillectomy With Adenoidectomy;  Recorded: 02/22/2008;     TUMOR REMOVAL  2008    right vocal cord -        CURRENT MEDS:    Current Outpatient Medications:      " aspirin 81 MG EC tablet, Take 81 mg by mouth daily., Disp: , Rfl:      calcium carbonate (CALCIUM CARBONATE) 300 mg (750 mg) Chew, Chew as needed. , Disp: , Rfl:      coenzyme Q10 (CO Q-10) 200 mg capsule, Take 200 mg by mouth 2 (two) times a day., Disp: , Rfl:      DIPHENHYDRAMINE HCL (BENADRYL ALLERGY ORAL), Take by mouth as needed., Disp: , Rfl:      lisinopril (PRINIVIL,ZESTRIL) 10 MG tablet, TAKE ONE-HALF (1/2) TABLET DAILY EVERY MORNING, Disp: 45 tablet, Rfl: 0     metoprolol succinate (TOPROL-XL) 100 MG 24 hr tablet, TAKE 1 TABLET AT BEDTIME, Disp: 90 tablet, Rfl: 2     metoprolol succinate (TOPROL-XL) 50 MG 24 hr tablet, TAKE 1 TABLET PLUS 1 TABLET  MG DAILY FOR TOTAL DOSE  MG, Disp: 90 tablet, Rfl: 2     omeprazole (PRILOSEC) 20 MG capsule, Take 1 capsule (20 mg total) by mouth as needed., Disp: 90 capsule, Rfl: 3     psyllium with dextrose (METAMUCIL JAR) powder, Take by mouth Daily after lunch., Disp: , Rfl:      sildenafil (VIAGRA) 100 MG tablet, Take 0.5 tablets (50 mg total) by mouth daily as needed for erectile dysfunction., Disp: 30 tablet, Rfl: 5     simvastatin (ZOCOR) 40 MG tablet, TAKE 1 TABLET AT BEDTIME, Disp: 90 tablet, Rfl: 0     tadalafil (CIALIS) 20 MG tablet, Take 0.5 tablets (10 mg total) by mouth daily as needed for erectile dysfunction., Disp: 20 tablet, Rfl: 5     fluticasone propionate (FLONASE) 50 mcg/actuation nasal spray, , Disp: , Rfl:      latanoprost (XALATAN) 0.005 % ophthalmic solution, , Disp: , Rfl:      NITROSTAT 0.4 mg SL tablet, Place 0.4 mg under the tongue every 5 (five) minutes as needed. , Disp: , Rfl:      traMADol (ULTRAM) 50 mg tablet, Take 1-2 tablets ( mg total) by mouth every 6 (six) hours as needed for pain. Take 1-2 tablets every 6 hours as needed for pain, Disp: 40 tablet, Rfl: 1     traZODone (DESYREL) 100 MG tablet, TAKE ONE-HALF (1/2) TABLET AT BEDTIME, Disp: 45 tablet, Rfl: 3    Family History   Problem Relation Age of Onset      "Pneumonia Mother      Hypertension Mother      Cancer Father      COPD Sister      Multiple sclerosis Sister      Acute Myocardial Infarction Neg Hx         reports that he quit smoking about 11 years ago. His smoking use included cigarettes. He has never used smokeless tobacco. He reports that he does not drink alcohol or use drugs.    Review of Systems:  The 10 point review of systems  is within normal limits except for as mentioned above in the HPI.  General ROS: No complaints or constitutional symptoms  Skin: No complaints or symptoms   Hematologic/Lymphatic: No symptoms or complaints  Psychiatric: No symptoms or complaints  Endocrine: No excessive fatigue, no hypermetabolic symptoms reported  Respiratory ROS: no cough, shortness of breath, or wheezing  Cardiovascular ROS: no chest pain or dyspnea on exertion  Gastrointestinal ROS: As per HPI  Musculoskeletal ROS: no recent injuries reported  Neurological ROS: no focal neurologic defects reported.        /72   Pulse 86   Resp 16   Ht 5' 11\" (1.803 m)   Wt (!) 249 lb 14.4 oz (113.4 kg)   SpO2 93%   BMI 34.85 kg/m    Body mass index is 34.85 kg/m .    EXAM:  General : Alert, cooperative, appears stated age   Skin: Skin color, texture, turgor normal, no rashes or lesions   Lymphatic: No obvious adenopathy, no swelling   Eyes: No scleral icterus, pupils equal  HENT: no traumatic injury to the head or face, no gross abnormalities  Lungs: Normal respiratory effort  Heart: Regular rate and rhythm  Abdomen: Soft, nondistended, mild discomfort with palpation in the right upper quadrant  Musculoskeletal: No obvious swelling  Neurologic: Grossly intact      LABS:  Lab Results   Component Value Date    WBC 7.7 10/01/2018    WBC 6.3 06/29/2015    HGB 16.5 10/01/2018    HCT 48.3 10/01/2018    MCV 95 10/01/2018     10/01/2018     INR/Prothrombin Time      Lab Results   Component Value Date    ALT 33 12/12/2018    AST 27 12/12/2018    ALKPHOS 57 12/12/2018    " BILITOT 0.7 12/12/2018       IMAGES:   Relevant images were reviewed and discussed with the patient.  Notable findings were: From ultrasound imaging obtained earlier this year, March 21, demonstrates a large stone in the gallbladder, which is otherwise contracted down around it.    Assessment/Plan:   1. Calculus of gallbladder without cholecystitis without obstruction        Víctor Calderon is a 74 y.o. male with signs and symptoms consistent with biliary colic, potentially with component of chronic cholecystitis.  I have explained the pathophysiology of gallstone formation and progression to symptomatic gallstone disease in detail as well as the surgical versus non-operative management strategies.      The risks of surgery were discussed in detail which include, but are not limited to, bleeding, infection, injury to surrounding structures, the need to convert to an open procedure, blood clots, stroke, heart attack and death.  Specifically we discussed the risk of damage to the common bile duct and hepatic vessels, and the complications that may arise from damage to these structures.  Additionally, the risks of non operative management were discussed which include, but are not limited to, worsening infection, increased pain, sepsis and death.     He understands everything which was discussed and has consented to proceed with a laparoscopic cholecystectomy.  We will schedule surgery accordingly.       Shin Baird MD  [phone]  [facility] Department of Surgery

## 2021-06-01 NOTE — TELEPHONE ENCOUNTER
"Pt calling with complaint of RUQ pain and a \"gallbladder attack\" that has lasted since 4am.    Pt is scheduled for a laparoscopic cholecystectomy tomorrow with Dr. Baird. This was going to be re-scheduled due to illness, however pt reports improved cold symptoms and is feeling better.    When I asked pt to describe his pain, he described it as constant and dull. He could not tell me a number on the pain scale, but his wife was heard yelling \"tell her it's a 7\" in the background. He denies any nausea, vomiting, or fever.     Advised pt that if his pain is, or becomes severe he should be seen in the ED. Otherwise, discussed trying Tylenol for pain relief. Pt states he has an old Rx for Tramadol and would like to try that.     Pt informed I would speak with Dr. Baird for further recommendations and call him back. If Tramadol helps with pain relief, discussed with pt that Dr. Baird may be willing to send a new Rx to help tie him over until surgery tomorrow.       "

## 2021-06-01 NOTE — PROGRESS NOTES
[facility] Office Note    Assessment / Plan:    1.  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  Status post CRT-D device placement.  Doing well.   2.  Obstructive sleep apnea on CPAP.   3.  Morbid obesity.  Discussed at length and recommended Mediterranean diet, increased vegetable intake, decreased calorie intake, and resuming regular exercise regimen including prolonged dog walking to help wear them out!  4.  Paroxysmal atrial fibrillation one episode, associated with gallbladder tach, does not warrant long-term anticoagulation, but we discussed that this is likely in his future unless he loses weight.      Plan follow-up in 1 year    ______________________________________________________________________    Subjective:    I had the opportunity to see Víctor Calderon at the [facility]. Víctor Calderon is a 74 y.o. male with a history of bradycardia.  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  He underwent CRT-D therapy.  Since then there has been gradual improvement in his ejection fraction.  An echocardiogram done 2018 showed an improvement in his ejection fraction up to 50%.  He has no history of significant coronary artery disease.  He returns today for routine follow-up, accompanied by his wife    He is using a CPAP mask regularly.  He is not aware of any palpitations but has had recent gallbladder attacks.  Cholecystectomy is scheduled for October.    deVice interrogation today shows a 4-hour bout of atrial fibrillation which appears to been associated with a gallbladder attack.  He has had no prolonged episodes otherwise.  His weight is up 25 pounds since I saw him last, he realizes he is not sticking to his diet.  Wife tries to cook lower calorie, increased vegetable meals including her homemade soup, patient prefers pizza.    ______________________________________________________________________    Problem  "List:  Patient Active Problem List   Diagnosis     Skin Neoplasm Of Uncertain Behavior     Hypercholesterolemia     Obstructive Sleep Apnea     Esophageal Reflux     Impaired Fasting Glucose     Changed Sexual Interest (Libido): Decreased     Cholelithiasis     Laryngeal Neoplasm Of The Vocal Cord     Hypertension     Dermatitis     Hoarseness     Male Erectile Disorder     Hypogonadism     Insomnia     Benign Tubular Adenoma Of The Large Intestine     Class 1 obesity due to excess calories with serious comorbidity and body mass index (BMI) of 31.0 to 31.9 in adult     Skin Tag (___ Mm)     Tachycardia     Bradycardia     Cardiac arrest (H)     Heart block AV complete (H)     Right ventricular dysfunction     CAD (coronary artery disease)     Normochromic normocytic anemia     NSVT (nonsustained ventricular tachycardia) (H)     Ischemic cardiomyopathy     Complete AV block, acquired (H)     Adenomatous colon polyp     Tubular adenoma of colon     ROMELIA on CPAP     ICD (implantable cardioverter-defibrillator), biventricular, in situ     Bilateral hearing loss     Erectile dysfunction, unspecified erectile dysfunction type     Cholelithiasis     Medical History:  Past Medical History:   Diagnosis Date     Cholelithiasis      Coronary artery disease      Dermatitis      GERD (gastroesophageal reflux disease)      H/O cardiac arrest 2/2015    alka/torsades     Hyperlipidemia      Hypertension      Hypogonadism male      Left eye complaint     \"high pressure\"     Male erectile disorder      Obesity      Sleep apnea      Ventricular tachycardia (H)     seen on pacer interrogation     Surgical History:  Past Surgical History:   Procedure Laterality Date     CARDIAC CATHETERIZATION       COLONOSCOPY N/A 9/30/2015    Procedure: COLONOSCOPY;  Surgeon: Rafiq Gu MD;  Location: [facility];  Service:      HERNIA REPAIR      inguinal      INSERT / REPLACE / REMOVE PACEMAKER       polyp  2010    removed during " colonoscopy     MS REMOVAL OF SPERM DUCT(S)      Description: Surgery Of Male Genitalia Vasectomy;  Recorded: 2008;     MS REMOVE TONSILS/ADENOIDS,<11 Y/O      Description: Tonsillectomy With Adenoidectomy;  Recorded: 2008;     TUMOR REMOVAL      right vocal cord -      Social History:  Social History     Socioeconomic History     Marital status:      Spouse name: Not on file     Number of children: Not on file     Years of education: Not on file     Highest education level: Not on file   Occupational History     Not on file   Social Needs     Financial resource strain: Not on file     Food insecurity:     Worry: Not on file     Inability: Not on file     Transportation needs:     Medical: Not on file     Non-medical: Not on file   Tobacco Use     Smoking status: Former Smoker     Types: Cigarettes     Last attempt to quit: 2008     Years since quittin.2     Smokeless tobacco: Never Used   Substance and Sexual Activity     Alcohol use: No     Drug use: No     Sexual activity: Not on file   Lifestyle     Physical activity:     Days per week: Not on file     Minutes per session: Not on file     Stress: Not on file   Relationships     Social connections:     Talks on phone: Not on file     Gets together: Not on file     Attends [employer] service: Not on file     Active member of club or organization: Not on file     Attends meetings of clubs or organizations: Not on file     Relationship status: Not on file     Intimate partner violence:     Fear of current or ex partner: Not on file     Emotionally abused: Not on file     Physically abused: Not on file     Forced sexual activity: Not on file   Other Topics Concern     Not on file   Social History Narrative     Not on file     Sleep History:  Uses CPAP nightly with good restoration.  Dogs awaken him from sleep early mornings  Exercise History:  Walks dogs most days with wife.  Not as often as previously    Review of Systems:   General:  WNL  Eyes: WNL  Ears/Nose/Throat: WNL  Lungs: WNL  Heart: WNL  Stomach: WNL  Bladder: WNL  Muscle/Joints: WNL  Skin: WNL  Nervous System: WNL  Mental Health: WNL     Blood: WNL          Family History:  Family History   Problem Relation Age of Onset     Pneumonia Mother      Hypertension Mother      Cancer Father      COPD Sister      Multiple sclerosis Sister      Acute Myocardial Infarction Neg Hx          Allergies:  No Known Allergies  Medications:  Current Outpatient Medications   Medication Sig Dispense Refill     aspirin 81 MG EC tablet Take 81 mg by mouth daily.       calcium carbonate (CALCIUM CARBONATE) 300 mg (750 mg) Chew Chew as needed.        coenzyme Q10 (CO Q-10) 200 mg capsule Take 200 mg by mouth 2 (two) times a day.       DIPHENHYDRAMINE HCL (BENADRYL ALLERGY ORAL) Take by mouth as needed.       fluticasone propionate (FLONASE) 50 mcg/actuation nasal spray        latanoprost (XALATAN) 0.005 % ophthalmic solution        lisinopril (PRINIVIL,ZESTRIL) 10 MG tablet TAKE ONE-HALF (1/2) TABLET DAILY EVERY MORNING 45 tablet 0     metoprolol succinate (TOPROL-XL) 100 MG 24 hr tablet TAKE 1 TABLET AT BEDTIME 90 tablet 2     metoprolol succinate (TOPROL-XL) 50 MG 24 hr tablet TAKE 1 TABLET PLUS 1 TABLET  MG DAILY FOR TOTAL DOSE  MG 90 tablet 2     NITROSTAT 0.4 mg SL tablet Place 0.4 mg under the tongue every 5 (five) minutes as needed.        omeprazole (PRILOSEC) 20 MG capsule Take 1 capsule (20 mg total) by mouth as needed. 90 capsule 3     psyllium with dextrose (METAMUCIL JAR) powder Take by mouth Daily after lunch.       sildenafil (VIAGRA) 100 MG tablet Take 0.5 tablets (50 mg total) by mouth daily as needed for erectile dysfunction. 30 tablet 5     simvastatin (ZOCOR) 40 MG tablet TAKE 1 TABLET AT BEDTIME 90 tablet 0     tadalafil (CIALIS) 20 MG tablet Take 0.5 tablets (10 mg total) by mouth daily as needed for erectile dysfunction. 20 tablet 5     traMADol (ULTRAM) 50 mg tablet Take 1-2  "tablets ( mg total) by mouth every 6 (six) hours as needed for pain. Take 1-2 tablets every 6 hours as needed for pain 40 tablet 1     traZODone (DESYREL) 100 MG tablet TAKE ONE-HALF (1/2) TABLET AT BEDTIME 45 tablet 3     No current facility-administered medications for this visit.        Objective:   Wt Readings from Last 3 Encounters:   09/16/19 (!) 248 lb (112.5 kg)   09/10/19 (!) 249 lb 14.4 oz (113.4 kg)   06/12/19 (!) 235 lb (106.6 kg)     Vital signs:  /82 (Patient Site: Left Arm, Patient Position: Sitting, Cuff Size: Adult Large)   Pulse 76   Resp 16   Ht 5' 10.47\" (1.79 m)   Wt (!) 248 lb (112.5 kg)   BMI 35.11 kg/m        Physical Exam:    GENERAL APPEARANCE: Alert, cooperative and in no acute distress.  HEENT: Conjunctivae  s bilaterally injected.  No scleral icterus. No Xanthelasma. Oral mucous membranes pink and moist.  NECK: No JVD.  No Hepatojugular reflux. Thyroid not enlarged.   CHEST: clear to auscultation  CARDIOVASCULAR: regular S1, S2 without murmur ,clicks or rubs.  Nonpalpable point of maximal impulse.. Brachial, radial and posterior tibial pulses are intact and symetric. No carotid bruits noted.  ABDOMEN obese, soft. Nontender. BS+. No bruits.  EXTREMITIES: No cyanosis, clubbing or edema.    Lab Results:  LIPIDS:  Lab Results   Component Value Date    CHOL 155 12/12/2018    CHOL 138 06/12/2018    CHOL 128 11/15/2017     Lab Results   Component Value Date    HDL 56 12/12/2018    HDL 41 06/12/2018    HDL 42 11/15/2017     Lab Results   Component Value Date    LDLCALC 83 12/12/2018    LDLCALC 76 06/12/2018    LDLCALC 65 11/15/2017     Lab Results   Component Value Date    TRIG 79 12/12/2018    TRIG 103 06/12/2018    TRIG 106 11/15/2017       Echocardiogram 5/2018:  Summary     Left ventricle ejection fraction is mildly decreased. The calculated left ventricular ejection fraction is 51%.    Normal left ventricular size.    Normal right ventricular size and systolic " function.    Aortic Valve: The aortic valve is sclerotic without reduced excursion. Mild aortic regurgitation.    No hemodynamically significant valvular heart abnormalities.    When compared to the previous study dated 1/4/2016, no significant change.                   SAYRA PRATHER MD [facility]  [phone]    This note created using Dragon voice recognition software.  Sound alike errors may have escaped editing.

## 2021-06-01 NOTE — ANESTHESIA PREPROCEDURE EVALUATION
Anesthesia Evaluation      Patient summary reviewed   No history of anesthetic complications     Airway   Mallampati: II  Neck ROM: full   Pulmonary - normal exam    breath sounds clear to auscultation  (+) sleep apnea on CPAP, , a smoker  (-) shortness of breath                         Cardiovascular - normal exam  Exercise tolerance: > or = 4 METS  (+) pacemaker, hypertension, CAD, dysrhythmias, , hypercholesterolemia,     (-) angina  ECG reviewed (paced)  Rhythm: regular  Rate: normal,         Neuro/Psych      Comments: ED    Endo/Other    (+) obesity,      Comments: Hypogonadism  Laryngeal neoplasm    GI/Hepatic/Renal    (+) GERD,       Comments: cholelithiasis          Dental    (+) caps                       Anesthesia Plan  Planned anesthetic: general endotracheal    ASA 3   Induction: intravenous   Anesthetic plan and risks discussed with: patient and spouse  Anesthesia plan special considerations: antiemetics,   Post-op plan: other (alta orders)

## 2021-06-01 NOTE — ANESTHESIA POSTPROCEDURE EVALUATION
Patient: Víctor Calderon  CHOLECYSTECTOMY, LAPAROSCOPIC  Anesthesia type: general    Patient location: Phase II Recovery  Last vitals:   Vitals Value Taken Time   /62 10/2/2019 11:11 AM   Temp 36.6  C (97.8  F) 10/2/2019  9:56 AM   Pulse 74 10/2/2019 11:11 AM   Resp 16 10/2/2019 11:11 AM   SpO2 92 % 10/2/2019 11:11 AM     Post vital signs: stable  Level of consciousness: awake and responds to simple questions  Post-anesthesia pain: pain controlled  Post-anesthesia nausea and vomiting: no  Pulmonary: unassisted, return to baseline  Cardiovascular: stable and blood pressure at baseline  Hydration: adequate  Anesthetic events: no    QCDR Measures:  ASA# 11 - An-op Cardiac Arrest: ASA11B - Patient did NOT experience unanticipated cardiac arrest  ASA# 12 - An-op Mortality Rate: ASA12B - Patient did NOT die  ASA# 13 - PACU Re-Intubation Rate: ASA13B - Patient did NOT require a new airway mgmt  ASA# 10 - Composite Anes Safety: ASA10A - No serious adverse event    Additional Notes:

## 2021-06-01 NOTE — PATIENT INSTRUCTIONS - HE
1. Get back on your regular walking program with the dogs, try for 1 hour nightly  2. Work on a Mediterranean-style diet with an increase in your vegetable intake to help with weight loss  3. No need for further testing prior to gallbladder surgery if you are walking for 1 mile daily  4. I look forward to seeing you back in 1 year

## 2021-06-01 NOTE — PROGRESS NOTES
In clinic device check with Dr. Frost.  Please see link for full device report.  Patient was informed of results and next follow up during today's visit.

## 2021-06-01 NOTE — ANESTHESIA CARE TRANSFER NOTE
Last vitals:   Vitals:    10/02/19 0852   BP: 163/83   Pulse: 81   Resp: 18   Temp: 36.4  C (97.5  F)   SpO2: 99%     Patient's level of consciousness is drowsy  Spontaneous respirations: yes  Maintains airway independently: yes  Dentition unchanged: yes  Oropharynx: oropharynx clear of all foreign objects    QCDR Measures:  ASA# 20 - Surgical Safety Checklist: WHO surgical safety checklist completed prior to induction    PQRS# 430 - Adult PONV Prevention: 4558F - Pt received => 2 anti-emetic agents (different classes) preop & intraop  ASA# 8 - Peds PONV Prevention: NA - Not pediatric patient, not GA or 2 or more risk factors NOT present  PQRS# 424 - An-op Temp Management: 4559F - At least one body temp DOCUMENTED => 35.5C or 95.9F within required timeframe  PQRS# 426 - PACU Transfer Protocol: - Transfer of care checklist used  ASA# 14 - Acute Post-op Pain: ASA14B - Patient did NOT experience pain >= 7 out of 10

## 2021-06-01 NOTE — PROGRESS NOTES
Preoperative Exam    Scheduled Procedure:  lap gallbladder removal  Surgery Date:  10/02/19  Surgery Location: [facility], fax [phone]    Surgeon:  Dr Shin Baird    Assessment/Plan:     1. Pre-operative cardiovascular examination  Preoperative cardiovascular examination completed.  Laparoscopic cholecystectomy scheduled.  No contraindication identified to scheduled procedure.  - Electrocardiogram Perform and Read    2. Calculus of gallbladder without cholecystitis without obstruction  History of cholelithiasis with symptomatic cholelithiasis history.    3. Essential hypertension with goal blood pressure less than 130/80  Hypertension, fair control.  Patient likes to continue lisinopril 5 mg daily and requests prescription update so that he does not have to cut 10 mg tablet in half.  - Basic Metabolic Panel    4. Impaired fasting glucose  History of impaired fasting glucose with prior A1c of 5.7% June 12, 2019.  - Basic Metabolic Panel    5. Ischemic cardiomyopathy  History of ischemic cardiomyopathy noted.  Improved EF from 33% up to 51% at time of most recent echocardiogram August 23, 2018.  Stable.  Asymptomatic.    6. Need for vaccination  High-dose flu shot provided.  - Influenza High Dose, Seasonal 65+ yrs    7. ROMELIA on CPAP  Continue CPAP for ROMELIA management.    8. Gastroesophageal reflux disease without esophagitis  GERD, stable.    9. Paroxysmal atrial fibrillation (H)  Episode of paroxysmal atrial fibrillation associated with gallbladder attack described.  Patient wants to remain off anticoagulation.  Continues aspirin 81 mg daily.  Will hold 1 week prior to scheduled procedure.  Check magnesium, base metabolic panel, CBC and TSH.  - Magnesium  - Basic Metabolic Panel  - HM2(CBC w/o Differential)  - Thyroid Stimulating Hormone (TSH)    10. Essential hypertension  Updated prescription provided per patient request.  - lisinopril (PRINIVIL,ZESTRIL) 5 MG tablet; Take 1 tablet (5 mg total) by  mouth daily.  Dispense: 90 tablet; Refill: 3    11. Cardiomyopathy (H)  As above, updated prescription provided per patient request.  - lisinopril (PRINIVIL,ZESTRIL) 5 MG tablet; Take 1 tablet (5 mg total) by mouth daily.  Dispense: 90 tablet; Refill: 3    12. Insomnia  Trazodone 50 mg at bedtime with updated prescription provided per patient request.  - traZODone (DESYREL) 50 MG tablet; Take 1 tablet (50 mg total) by mouth at bedtime.  Dispense: 90 tablet; Refill: 3        Surgical Procedure Risk: Low (reported cardiac risk generally < 1%)  Have you had prior anesthesia?: Yes  Have you or any family members had a previous anesthesia reaction:  No  Do you or any family members have a history of a clotting or bleeding disorder?: No  Cardiac Risk Assessment: increased risk for major cardiac complications based on  myocardial infarction history    Pending review of diagnostic evaluation, APPROVAL GIVEN to proceed with proposed procedure, without further diagnostic evaluation.    Please Note:  Patient uses a CPAP machine.  Patient has an implanted device.  Device Type:        Device Vendor:       Functional Status: Independent  Patient plans to recover at home with family.     Subjective:      Víctor Calderon is a 74 y.o. male who presents for a preoperative consultation.  Doing well.  Symptomatic cholelithiasis noted..  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  Status post CRT-D device placement 2/9/15.  Doing well.  Noted episode of paroxysmal atrial fibrillation (one episode), associated with gallbladder attack, does not warrant long-term anticoagulation, but we discussed that this is likely in his future unless he loses weight.metoprolol succinate 50 mg a.m. and 100 mg at bedtime otherwise symptomatic issues with combined dose.  Obstructive sleep apnea on CPAP.  History of impaired fasting glucose.  Underlying history of insomnia treated with trazodone 100 mg using half tablet daily.   "Needs prescription refill.  Denies asthma.  Reflux stable.  Noted weight gain since physical exam 2018 of 23 pounds.  Starting a low-carb diet tomorrow.    All other systems reviewed and are negative, other than those listed in the HPI.     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 left s/p T & A as child; dual chamber pacemaker placement on 2/9/15 after cardiac arrest.  Quit EtOH 02   ROMELIA on CPAP at 8 cm H2O  PHQ-9 score= (9/22/10)     Pertinent History  Do you have difficulty breathing or chest pain after walking up a flight of stairs: No  History of obstructive sleep apnea: Yes: continues CPAP  Steroid use in the last 6 months: Yes: nasal spray daily  Frequent Aspirin/NSAID use: Yes: ASA 81 mg daily  Prior Blood Transfusion: No  Prior Blood Transfusion Reaction: No  If for some reason prior to, during or after the procedure, if it is medically indicated, would you be willing to have a blood transfusion?:  There is no transfusion refusal.    Current Outpatient Medications   Medication Sig Dispense Refill     aspirin 81 MG EC tablet Take 81 mg by mouth daily.       calcium carbonate (CALCIUM CARBONATE) 300 mg (750 mg) Chew Chew as needed.        coenzyme Q10 (CO Q-10) 200 mg capsule Take 200 mg by mouth 2 (two) times a day.       DIPHENHYDRAMINE HCL (BENADRYL ALLERGY ORAL) Take by mouth as needed.       fluticasone propionate (FLONASE) 50 mcg/actuation nasal spray        latanoprost (XALATAN) 0.005 % ophthalmic solution        lisinopril (PRINIVIL,ZESTRIL) 5 MG " tablet Take 1 tablet (5 mg total) by mouth daily. 90 tablet 3     metoprolol succinate (TOPROL-XL) 100 MG 24 hr tablet TAKE 1 TABLET AT BEDTIME 90 tablet 2     metoprolol succinate (TOPROL-XL) 50 MG 24 hr tablet TAKE 1 TABLET PLUS 1 TABLET  MG DAILY FOR TOTAL DOSE  MG 90 tablet 2     NITROSTAT 0.4 mg SL tablet Place 0.4 mg under the tongue every 5 (five) minutes as needed.        omeprazole (PRILOSEC) 20 MG capsule Take 1 capsule (20 mg total) by mouth as needed. 90 capsule 3     psyllium with dextrose (METAMUCIL JAR) powder Take by mouth Daily after lunch.       sildenafil (VIAGRA) 100 MG tablet Take 0.5 tablets (50 mg total) by mouth daily as needed for erectile dysfunction. 30 tablet 5     simvastatin (ZOCOR) 40 MG tablet TAKE 1 TABLET AT BEDTIME 90 tablet 0     tadalafil (CIALIS) 20 MG tablet Take 0.5 tablets (10 mg total) by mouth daily as needed for erectile dysfunction. 20 tablet 5     traMADol (ULTRAM) 50 mg tablet Take 1-2 tablets ( mg total) by mouth every 6 (six) hours as needed for pain. Take 1-2 tablets every 6 hours as needed for pain 40 tablet 1     traZODone (DESYREL) 50 MG tablet Take 1 tablet (50 mg total) by mouth at bedtime. 90 tablet 3     No current facility-administered medications for this visit.         No Known Allergies    Patient Active Problem List   Diagnosis     Skin Neoplasm Of Uncertain Behavior     Hypercholesterolemia     Obstructive Sleep Apnea     Esophageal Reflux     Impaired Fasting Glucose     Changed Sexual Interest (Libido): Decreased     Cholelithiasis     Laryngeal Neoplasm Of The Vocal Cord     Hypertension     Dermatitis     Hoarseness     Male Erectile Disorder     Hypogonadism     Insomnia     Benign Tubular Adenoma Of The Large Intestine     Class 1 obesity due to excess calories with serious comorbidity and body mass index (BMI) of 31.0 to 31.9 in adult     Skin Tag (___ Mm)     Tachycardia     Bradycardia     Cardiac arrest (H)     Heart block AV  "complete (H)     Right ventricular dysfunction     CAD (coronary artery disease)     Normochromic normocytic anemia     NSVT (nonsustained ventricular tachycardia) (H)     Ischemic cardiomyopathy     Complete AV block, acquired (H)     Adenomatous colon polyp     Tubular adenoma of colon     ROMELIA on CPAP     ICD (implantable cardioverter-defibrillator), biventricular, in situ     Bilateral hearing loss     Erectile dysfunction, unspecified erectile dysfunction type     Cholelithiasis     Paroxysmal atrial fibrillation (H)       Past Medical History:   Diagnosis Date     Cholelithiasis      Coronary artery disease      Dermatitis      GERD (gastroesophageal reflux disease)      H/O cardiac arrest 2/2015    alka/torsades     Hyperlipidemia      Hypertension      Hypogonadism male      Left eye complaint     \"high pressure\"     Male erectile disorder      Obesity      Sleep apnea      Ventricular tachycardia (H)     seen on pacer interrogation       Past Surgical History:   Procedure Laterality Date     CARDIAC CATHETERIZATION       COLONOSCOPY N/A 9/30/2015    Procedure: COLONOSCOPY;  Surgeon: Rafiq Gu MD;  [facility];  Service:      HERNIA REPAIR      inguinal      INSERT / REPLACE / REMOVE PACEMAKER       polyp  2010    removed during colonoscopy     NV REMOVAL OF SPERM DUCT(S)      Description: Surgery Of Male Genitalia Vasectomy;  Recorded: 02/22/2008;     NV REMOVE TONSILS/ADENOIDS,<11 Y/O      Description: Tonsillectomy With Adenoidectomy;  Recorded: 02/22/2008;     TUMOR REMOVAL  2008    right vocal cord -        Social History     Socioeconomic History     Marital status:      Spouse name: Not on file     Number of children: Not on file     Years of education: Not on file     Highest education level: Not on file   Occupational History     Not on file   Social Needs     Financial resource strain: Not on file     Food insecurity:     Worry: Not on file     Inability: Not on file " "    Transportation needs:     Medical: Not on file     Non-medical: Not on file   Tobacco Use     Smoking status: Former Smoker     Types: Cigarettes     Last attempt to quit: 2008     Years since quittin.3     Smokeless tobacco: Never Used   Substance and Sexual Activity     Alcohol use: No     Drug use: No     Sexual activity: Not on file   Lifestyle     Physical activity:     Days per week: Not on file     Minutes per session: Not on file     Stress: Not on file   Relationships     Social connections:     Talks on phone: Not on file     Gets together: Not on file     Attends Zoroastrianism service: Not on file     Active member of club or organization: Not on file     Attends meetings of clubs or organizations: Not on file     Relationship status: Not on file     Intimate partner violence:     Fear of current or ex partner: Not on file     Emotionally abused: Not on file     Physically abused: Not on file     Forced sexual activity: Not on file   Other Topics Concern     Not on file   Social History Narrative     Not on file       Patient Care Team:  Adams Garcia MD as PCP - General  Adams Garcia MD as Assigned PCP          Objective:     Vitals:    19 1209 19 1213   BP: 140/80 140/88   Pulse: 62    SpO2: 98%    Weight: (!) 245 lb (111.1 kg)    Height: 5' 10\" (1.778 m)          Physical Exam:  Physical Exam     General Appearance:    Alert, cooperative, no distress, appears stated age.  Obesity.   Head:    Normocephalic, without obvious abnormality, atraumatic   Eyes:    PERRL, conjunctiva/corneas clear, EOM's intact, fundi     benign, both eyes.  Glasses.        Ears:    Normal TM's and external ear canals, both ears   Nose:   Nares normal, septum midline, mucosa normal, no drainage    or sinus tenderness   Throat:   Lips, mucosa, and tongue normal; teeth and gums normal   Neck:   Supple, symmetrical, trachea midline, no adenopathy;        thyroid:  No enlargement/tenderness/nodules; no " carotid    bruit or JVD   Back:     Symmetric, no curvature, ROM normal, no CVA tenderness   Lungs:     Clear to auscultation bilaterally, respirations unlabored   Chest wall:    No tenderness or deformity   Heart:    Regular rate and rhythm, S1 and S2 normal, no murmur, rub   or gallop   Abdomen:     Soft, non-tender, bowel sounds active all four quadrants,     no masses, no organomegaly.     Genitalia:    deferred   Rectal:    deferred   Extremities:   Extremities normal, atraumatic, no cyanosis or edema   Pulses:   2+ and symmetric all extremities   Skin:   Skin color, texture, turgor normal, no rashes or lesions   Lymph nodes:   Cervical, supraclavicular, and axillary nodes normal   Neurologic:   CNII-XII intact. Normal strength, sensation and reflexes       throughout        There are no Patient Instructions on file for this visit.    EK19  AV dual-paced rhythm.    Labs:  Recent Results (from the past 24 hour(s))   CLINIC Device Check    Collection Time: 19  8:11 PM   Result Value Ref Range    Device Manufacture [employer]     Device Model G148 INOGEN X4 CRT-D     Device Serial Number [id number]     Device Type CRT ICD     Device Implanting Provider ANA Bond     Comments/Summary       Type: In clinic annual CRT-D check. Seen with Dr. Frost.  Presenting: AS BiVP 79bpm  Lead/Battery Status: Stable  Atrial Arrhythmias: 2 mode switches, burden <1%. Longest 4hr 13mins on 2019 (2a-6a). Pt reports he thinks he had a bad bladder attack that night.   Vent Arrhythmias: 1 high ventricular rate detection from 2019. Review of EGMs shows AT/AF in upper chamber, while lower chamber looks like RVR with frequent PVCs.   Comments: Normal device function. BiV pacing 100%. Reprogrammed RA output from 1.2V to 2V and LV output from 1.9V to 2V. BK     Electrocardiogram Perform and Read    Collection Time: 19 12:18 PM   Result Value Ref Range    SYSTOLIC BLOOD PRESSURE      DIASTOLIC  BLOOD PRESSURE      VENTRICULAR RATE 60 BPM    ATRIAL RATE 60 BPM    P-R INTERVAL      QRS DURATION 156 ms    Q-T INTERVAL 490 ms    QTC CALCULATION (BEZET) 490 ms    P Axis      R AXIS 156 degrees    T AXIS 46 degrees    MUSE DIAGNOSIS       AV dual-paced rhythm  Abnormal ECG  When compared with ECG of 07-AUG-2015 14:55,  Premature ventricular complexes are no longer Present  Vent. rate has decreased BY   6 BPM         Immunization History   Administered Date(s) Administered     Influenza high dose,seasonal,PF, 65+ yrs 11/17/2015, 11/17/2016, 11/15/2017, 10/01/2018, 09/17/2019     Influenza, seasonal,quad inj 6-35 mos 09/22/2010, 10/28/2011, 09/25/2012     Influenza,seasonal quad, PF 10/15/2014     Pneumo Conj 13-V (2010&after) 12/19/2014     Pneumo Polysac 23-V 02/27/2002, 01/31/2012     Td, adult adsorbed, PF 06/12/2019     Td,adult,historic,unspecified 01/03/1994, 10/01/2006     Tdap 09/03/2009     ZOSTER, LIVE 09/02/2008           EXAM: US ABDOMEN LIMITED  LOCATION: [facility]  DATE/TIME: 3/21/2019 1:10 PM     INDICATION: Right upper quadrant pain.  COMPARISON: None.     FINDINGS:  GALLBLADDER: Cholelithiasis. No wall thickening. Negative ultrasonic Mcconnell's sign.  BILE DUCTS: No intrahepatic bile duct dilation. Common duct measures 6 mm.  LIVER: Normal where seen.  RIGHT KIDNEY: No hydronephrosis.  PANCREAS: Not imaged in detail on this limited study. No incidental abnormalities.     No ascites in the right upper quadrant.     IMPRESSION:   CONCLUSION:  1.  Cholelithiasis without convincing cholecystitis.   2.  Upper normal caliber common bile duct.        Echo 8/23/18 Summary       Left ventricle ejection fraction is mildly decreased. The calculated left ventricular ejection fraction is 51%.    Normal left ventricular size.    Normal right ventricular size and systolic function.    Aortic Valve: The aortic valve is sclerotic without reduced excursion. Mild aortic regurgitation.    No hemodynamically  significant valvular heart abnormalities.    When compared to the previous study dated 1/4/2016, no significant change.           NUCLEAR STRESS 6/1/15  PRIMARY PHYSICIAN:  Dr. Adams Garcia.     ORDERING PHYSICIAN:  Dr. Tarun Bond.  INDICATION:  Ventricular tachycardia.     STRESS SUMMARY:  Exercise stress was attempted, as requested; however, after 3  minutes of exercise, the patient had marked dyspnea and a heart rate of only 78  beats per minute.  Therefore, exercise was stopped and 0.4 mg of intravenous  Lexiscan was administered per protocol per the supervising cardiologist, Dr. Anamaria Jauregui.  There were no cardiac symptoms.  The stress electrocardiogram was  nondiagnostic for ischemia due to ventricular paced rhythm.     TECHNICAL COMMENTS:  Nuclear imaging was accomplished utilizing 4.05 mCi of thallium  injected at rest and 32.3 mCi of sestamibi injected following Lexiscan infusion.   The  images are notable for splanchnic interference on both the  stress and rest images;  final image quality is suboptimal.     FINDINGS:  The Lexiscan stress and rest images demonstrate moderate reduction in  tracer uptake throughout the apex of the left ventricle.  There is also moderate  reduction in tracer uptake pattern in the mid to distal inferior and inferoseptal  segments of the left ventricle.  No inducible ischemia is identified.     The gated images demonstrate hypokinesis in both areas of infarction with otherwise  normal, but not compensatory wall thickening.  The measured ejection fraction is  33%.  Right ventricular size and contractility are subjectively normal.     CONCLUSION:  1.  Lexiscan stress nuclear study is abnormal.  2.  No inducible ischemia is identified.  3.  Small area of nontransmural infarction involving the apex of the left ventricle  as well as the mid to distal inferior and inferoseptal segments.  4.  Reduced left ventricular ejection fraction of 33%.        KEENAN CARVAJAL  MD Car 06/01/2015 15:16:46  T 06/01/2015 15:57:21  R 06/01/2015 15:57:21  [id number]          Electronically signed by Adams Garcia MD 09/17/19 12:10 PM

## 2021-06-01 NOTE — TELEPHONE ENCOUNTER
"Spoke with Dr. Baird who recommended pt go to the ED if his pain becomes severe.    Called pt back and he said he is feeling a lot better. Believes he \"passed a stone\" and feels fine.     Pt will proceed with surgery as planned tomorrow. He will take Tramadol or Tylenol for pain relief prn.   "

## 2021-06-02 VITALS — BODY MASS INDEX: 32.62 KG/M2 | HEIGHT: 71 IN | WEIGHT: 233 LBS

## 2021-06-02 VITALS — HEIGHT: 70 IN | BODY MASS INDEX: 31.78 KG/M2 | WEIGHT: 222 LBS

## 2021-06-02 VITALS — BODY MASS INDEX: 31.47 KG/M2 | WEIGHT: 224.8 LBS | HEIGHT: 71 IN

## 2021-06-02 NOTE — TELEPHONE ENCOUNTER
----- Message from Adams Garcia MD sent at 10/28/2019  9:05 AM CDT -----  Please notify patient.  Significant elevation of his liver enzymes following his recent gallbladder surgery.  Further evaluation needed.  I would like Mr. Calderon to complete an abdominal CT scan.  Our office will contact patient in order to arrange.  Please also help patient to schedule a follow-up office visit with me ASAP in order to repeat labs, etc.

## 2021-06-02 NOTE — PROGRESS NOTES
Assessment/Plan:    1. Acute chest pain  Acute chest pain.  Left anterior chest or epigastric region can radiate through to the back.  Intermittent episodes lasting about 10 hours.  No significant improvement with nitroglycerin that is  however.  Electrocardiogram with paced rhythm otherwise no acute ST or T wave changes identified.  Did check lipase, CBC and comprehensive metabolic panel.  New prescription for nitroglycerin sublingual as well as recommendation for trial of isosorbide mononitrate 30 mg every morning until completion of testing.  We will follow-up in this office no later than 2 weeks.  Will present to ER if recurrent concerns for chest discomfort etc.  Did increase metoprolol succinate from 50 mg a.m. and 100 mg p.m. up to 100 mg twice daily for antianginal benefits.  - Electrocardiogram Perform and Read  - Lipase  - Comprehensive Metabolic Panel  - HM2(CBC w/o Differential)  - NM Pharmacologic Stress Test; Future  - nitroglycerin (NITROSTAT) 0.4 MG SL tablet; Place 1 tablet (0.4 mg total) under the tongue every 5 (five) minutes as needed for chest pain.  Dispense: 100 tablet; Refill: 3    2. Coronary artery disease involving native coronary artery of native heart without angina pectoris  CAD history reviewed.  History of dual-chamber pacemaker placement 2015 after cardiac arrest.  Stress testing to be completed as noted above.  Recommendation to follow-up with Dr. Sarmiento in order to review.  - isosorbide mononitrate (IMDUR) 30 MG 24 hr tablet; Take 1 tablet (30 mg total) by mouth daily.  Dispense: 30 tablet; Refill: 2    3. Ischemic cardiomyopathy  Most recent ejection fraction did improve to 51% 2018.  Follows with Dr. Sarmiento.    4. Paroxysmal atrial fibrillation (H)  Excisional atrial fibrillation continues aspirin.  May benefit from warfarin anticoagulation however patient's been resistant to this point.  We will follow-up with Dr. Sarmiento for ongoing treatment  "recommendations.    5. Essential hypertension with goal blood pressure less than 130/80  Hypertension fair control blood pressure 130/70 on recheck.  Did increase metoprolol succinate from 50 mg morning and 100 mg evening 100 mg twice daily.      The following high BMI interventions were performed this visit: encouragement to exercise, weight monitoring, weight loss from baseline weight and lifestyle education regarding diet .  Ensure ongoing efforts to achieve weight goal < 220 pounds initially, < 210 pounds ideally.         Subjective:    Víctor Calderon is seen today for chest pain.  Can be an upper abdomen as well as left anterior chest.  Can go through to the back at times.  Typically last 10 hours.  Has had 3 episodes.  One that began at 6 AM today.  Has used Bufferin aspirin typically 2 tablets and then repeats 4 hours later.  Did have recent lap choly procedure completed 2019 with Dr. Olson.  No complication.  Fatty liver.  Not consuming alcohol with history of alcohol abuse with abstinence since 2002.  Ischemic cardiomyopathy with EF 33% originally improved to 51% on most recent echocardiogram 2018.  Has had prior stress testing as noted below abnormal.  Tried nitroglycerin sublingual however 6 years old and did not seem to make a difference even though did seem to burn under the tongue.  CPAP for ROMELIA management.  History of paroxysmal atrial fibrillation with most recent episode during described gallbladder attack.  Continues aspirin daily otherwise resistant to warfarin at this time.  Follows with Dr. Sarmiento cardiology.  Comprehensive review of systems as above otherwise all negative.     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 left s/p T & A as child; dual chamber pacemaker placement on 2/9/15 after cardiac arrest.  Quit EtOH 02   ROMELIA on CPAP at 8 cm H2O  PHQ-9 score= (9/22/10)     Past Surgical History:   Procedure Laterality Date     CARDIAC CATHETERIZATION       COLONOSCOPY N/A 2015    Procedure: COLONOSCOPY;  Surgeon: Rafiq Gu MD;  Location: [facility];  Service:      HERNIA REPAIR      inguinal      INSERT / REPLACE / REMOVE PACEMAKER       LAPAROSCOPIC CHOLECYSTECTOMY N/A 10/2/2019    Procedure: CHOLECYSTECTOMY, LAPAROSCOPIC;  Surgeon: Shin Baird MD;  Location: [facility] OR;  Service: General     polyp      removed during colonoscopy     IA REMOVAL OF SPERM DUCT(S)      Description: Surgery Of Male Genitalia Vasectomy;  Recorded: 2008;     IA REMOVE TONSILS/ADENOIDS,<11 Y/O      Description: Tonsillectomy With Adenoidectomy;  Recorded: 2008;     TONSILLECTOMY       TUMOR REMOVAL      right vocal cord -         Family History   Problem Relation Age of Onset     Pneumonia Mother      Hypertension Mother      Cancer Father      COPD Sister      Multiple sclerosis Sister      Acute Myocardial Infarction Neg Hx         Past Medical History:   Diagnosis Date     Cholelithiasis      Coronary artery disease      Dermatitis      GERD (gastroesophageal reflux disease)      H/O cardiac arrest 2015    alka/torsades     Hyperlipidemia      Hypertension      Hypogonadism male      Male erectile disorder      Obesity      Sleep apnea     CPAP     Ventricular tachycardia (H)     seen on pacer interrogation        Social History     Tobacco Use     Smoking status: Former Smoker     Packs/day: 0.00     Types: Cigarettes     Last attempt to quit: 2008     Years since quittin.4     Smokeless  tobacco: Never Used   Substance Use Topics     Alcohol use: No     Drug use: No        Current Outpatient Medications   Medication Sig Dispense Refill     aspirin 81 MG EC tablet Take 81 mg by mouth daily.       calcium carbonate (CALCIUM CARBONATE) 300 mg (750 mg) Chew Chew 2-3 tablets as needed (heartburn).              coenzyme Q10 (CO Q-10) 200 mg capsule Take 200 mg by mouth 2 (two) times a day.       DIPHENHYDRAMINE HCL (BENADRYL ALLERGY ORAL) Take 25-50 mg by mouth every 6 (six) hours as needed (allergies).              fluticasone propionate (FLONASE) 50 mcg/actuation nasal spray USE 2 SPRAYS IN EACH NOSTRIL DAILY 48 g 4     latanoprost (XALATAN) 0.005 % ophthalmic solution Administer 1 drop to both eyes at bedtime.              lisinopril (PRINIVIL,ZESTRIL) 5 MG tablet Take 1 tablet (5 mg total) by mouth daily. 90 tablet 3     metoprolol succinate (TOPROL-XL) 100 MG 24 hr tablet TAKE 1 TABLET AT BEDTIME 90 tablet 2     metoprolol succinate (TOPROL-XL) 50 MG 24 hr tablet TAKE 1 TABLET PLUS 1 TABLET  MG DAILY FOR TOTAL DOSE  MG (Patient taking differently: Take 50 mg by mouth every morning.       ) 90 tablet 2     multivitamin therapeutic tablet Take 1 tablet by mouth daily.       omeprazole (PRILOSEC) 20 MG capsule Take 1 capsule (20 mg total) by mouth as needed. (Patient taking differently: Take 20 mg by mouth at bedtime.       ) 90 capsule 3     oxymetazoline (AFRIN) 0.05 % nasal spray Apply 2 sprays into each nostril daily as needed for congestion.       psyllium with dextrose (METAMUCIL JAR) powder Take by mouth 2 (two) times a day.              simvastatin (ZOCOR) 40 MG tablet TAKE 1 TABLET AT BEDTIME 90 tablet 0     traMADol (ULTRAM) 50 mg tablet Take 1-2 tablets ( mg total) by mouth every 6 (six) hours as needed for pain. Take 1-2 tablets every 6 hours as needed for pain 15 tablet 1     traZODone (DESYREL) 50 MG tablet Take 1 tablet (50 mg total) by mouth at bedtime. (Patient taking  differently: Take 50 mg by mouth at bedtime as needed.       ) 90 tablet 3     isosorbide mononitrate (IMDUR) 30 MG 24 hr tablet Take 1 tablet (30 mg total) by mouth daily. 30 tablet 2     nitroglycerin (NITROSTAT) 0.4 MG SL tablet Place 1 tablet (0.4 mg total) under the tongue every 5 (five) minutes as needed for chest pain. 100 tablet 3     No current facility-administered medications for this visit.           Objective:    Vitals:    10/25/19 1218 10/25/19 1221   BP: 150/80 130/70   Pulse: 80    SpO2: 97%    Weight: (!) 231 lb (104.8 kg)       Body mass index is 33.15 kg/m .    Alert.  No apparent distress.  HEENT exam otherwise with moist mucous membranes.  Neck supple.  Chest clear.  Cardiac exam regular without cardiac ectopy or murmur.  Abdomen benign.  Positive bowel sounds without epigastric tenderness.  No abdominal mass.  Extremities warm and dry.  Neuro appears nonfocal.        Echo 8/23/18 Summary    Left ventricle ejection fraction is mildly decreased. The calculated left ventricular ejection fraction is 51%.    Normal left ventricular size.    Normal right ventricular size and systolic function.    Aortic Valve: The aortic valve is sclerotic without reduced excursion. Mild aortic regurgitation.    No hemodynamically significant valvular heart abnormalities.    When compared to the previous study dated 1/4/2016, no significant change.             NUCLEAR MEDICINE Pharmacologic STRESS 6/1/15  PRIMARY PHYSICIAN:  Dr. Adams Garcia.  ORDERING PHYSICIAN:  Dr. Tarun Bond.     INDICATION:  Ventricular tachycardia.     STRESS SUMMARY:  Exercise stress was attempted, as requested; however, after 3  minutes of exercise, the patient had marked dyspnea and a heart rate of only 78  beats per minute.  Therefore, exercise was stopped and 0.4 mg of intravenous  Lexiscan was administered per protocol per the supervising cardiologist, Dr. Anamaria Jauregui.  There were no cardiac symptoms.  The stress electrocardiogram  was  nondiagnostic for ischemia due to ventricular paced rhythm.     TECHNICAL COMMENTS:  Nuclear imaging was accomplished utilizing 4.05 mCi of thallium  injected at rest and 32.3 mCi of sestamibi injected following Lexiscan infusion.   The  images are notable for splanchnic interference on both the  stress and rest images;  final image quality is suboptimal.     FINDINGS:  The Lexiscan stress and rest images demonstrate moderate reduction in  tracer uptake throughout the apex of the left ventricle.  There is also moderate  reduction in tracer uptake pattern in the mid to distal inferior and inferoseptal  segments of the left ventricle.  No inducible ischemia is identified.     The gated images demonstrate hypokinesis in both areas of infarction with otherwise  normal, but not compensatory wall thickening.  The measured ejection fraction is  33%.  Right ventricular size and contractility are subjectively normal.     CONCLUSION:  1.  Lexiscan stress nuclear study is abnormal.  2.  No inducible ischemia is identified.  3.  Small area of nontransmural infarction involving the apex of the left ventricle  as well as the mid to distal inferior and inferoseptal segments.  4.  Reduced left ventricular ejection fraction of 33%.        KEENAN Car 06/01/2015 15:16:46  T 06/01/2015 15:57:21  R 06/01/2015 15:57:21  [id number]        This note has been dictated using voice recognition software and as a result may contain minor grammatical errors and unintended word substitutions.

## 2021-06-02 NOTE — TELEPHONE ENCOUNTER
RN cannot approve Refill Request    RN can NOT refill this medication historical medication requested. Last office visit: 6/12/2019 Adams Garcia MD Last Physical: 9/17/2019 Last MTM visit: Visit date not found Last visit same specialty: 6/12/2019 Adams Garcia MD.  Next visit within 3 mo: Visit date not found  Next physical within 3 mo: Visit date not found      [facility], [facility] Triage/Med Refill 10/18/2019    Requested Prescriptions   Pending Prescriptions Disp Refills     fluticasone propionate (FLONASE) 50 mcg/actuation nasal spray [Pharmacy Med Name: FLUTICASONE AK NS SP 16GM RX 50MCG] 48 g 4     Sig: USE 2 SPRAYS IN EACH NOSTRIL DAILY       Nasal Steroid Refill Protocol Passed - 10/18/2019 12:20 PM        Passed - Patient has had office visit/physical in last 2 years     Last office visit with prescriber/PCP: 6/12/2019 OR same dept: 6/12/2019 Adams Garcia MD OR same specialty: 6/12/2019 Adams Garcia MD Last physical: 9/17/2019 Last MTM visit: Visit date not found    Next appt within 3 mo: Visit date not found  Next physical within 3 mo: Visit date not found  Prescriber OR PCP: Adams Garcia MD  Last diagnosis associated with med order: 1. AR (allergic rhinitis)  - fluticasone propionate (FLONASE) 50 mcg/actuation nasal spray [Pharmacy Med Name: FLUTICASONE AK NS SP 16GM RX 50MCG]; USE 2 SPRAYS IN EACH NOSTRIL DAILY  Dispense: 48 g; Refill: 4     If protocol passes may refill for 12 months if within 3 months of last provider visit (or a total of 15 months).

## 2021-06-02 NOTE — PROGRESS NOTES
Assessment/Plan:    1. Hepatitis  Noted hepatitis with  and .  CBC and lipase were normal at prior office visit October 25, 2019.  Referred to gastroenterology to consider ERCP versus other evaluation.  Check ammonia and INR for rule out cirrhosis, etc.  Continues to abstain from alcohol times years (February 2002) as noted below.  No current evidence for icteric sclera, jaundice.  Question of fetor hepaticus reviewed.  - HM2(CBC w/o Differential)  - Comprehensive Metabolic Panel  - Lipase  - Ambulatory referral to Gastroenterology  - Ammonia  - INR    2. Abdominal pain, epigastric  No recurrent concerns for epigastric abdominal pain.  Noted hepatitis on lab evaluation with abdominal CT with evidence of common bile duct mild dilation with question of sludge within lower common bile duct versus stone.  Status post lap merced October 2, 2019 with Dr. Olson.  Patient will continue isosorbide mononitrate 30 mg a.m. until completion as well of nuclear medicine pharmacologic stress test.  - Ambulatory referral to Gastroenterology    3. Coronary artery disease involving native coronary artery of native heart without angina pectoris  CAD history.  Stress test to be completed.  Order placed October 25, 2019.  Continues isosorbide mononitrate 30 mg every morning.  History of dual-chamber pacemaker in place.        Subjective:    Víctor Calderon is seen today for follow-up evaluation.  Had been seen October 25, 2019 for epigastric abdominal pain and chest pain described.  History of CAD noted followed by Dr. Sarmiento with history of mild ischemic cardiomyopathy with EF improving to 51% August 23, 2018.  Prior history of paroxysmal atrial fibrillation at time of gallbladder attack otherwise continues aspirin anticoagulation without warfarin.  Patient feels that bowel movements have improved from a pale or light color to more normal color also urine he was dark however does not describe it as orange or brown now pale  "urine.  Patient's fiancé has noticed a breath smelling like mild falls.  No abdominal distention.  No nausea vomiting.  No recurrent concerns since prior visit and feels well currently with normal appetite.  Quit drinking alcohol in .  Comprehensive review of systems as above otherwise all negative.     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 left s/p T & A as child; dual chamber pacemaker placement on 2/9/15 after cardiac arrest.  Quit EtOH 02   ROMELIA on CPAP at 8 cm H2O  PHQ-9 score= (9/22/10)     Past Surgical History:   Procedure Laterality Date     CARDIAC CATHETERIZATION       COLONOSCOPY N/A 2015    Procedure: COLONOSCOPY;  Surgeon: Rafiq Gu MD;  Location: [facility];  Service:      HERNIA REPAIR      inguinal      INSERT / REPLACE / REMOVE PACEMAKER       LAPAROSCOPIC CHOLECYSTECTOMY N/A 10/2/2019    Procedure: CHOLECYSTECTOMY, LAPAROSCOPIC;  Surgeon: Shin Baird MD;  Location: [facility] OR;  Service: General     polyp      removed during colonoscopy     KS REMOVAL OF SPERM DUCT(S)      Description: Surgery Of Male Genitalia Vasectomy;  Recorded: 2008;     KS REMOVE TONSILS/ADENOIDS,<11 Y/O      Description: Tonsillectomy With Adenoidectomy;  Recorded: 2008;     TONSILLECTOMY       TUMOR REMOVAL      right vocal cord -         Family History   Problem Relation Age of Onset     Pneumonia Mother      Hypertension Mother      Cancer Father      COPD Sister      Multiple sclerosis Sister      Acute Myocardial Infarction " Neg Hx         Past Medical History:   Diagnosis Date     Cholelithiasis      Coronary artery disease      Dermatitis      GERD (gastroesophageal reflux disease)      H/O cardiac arrest 2015    alka/torsades     Hyperlipidemia      Hypertension      Hypogonadism male      Male erectile disorder      Obesity      Sleep apnea     CPAP     Ventricular tachycardia (H)     seen on pacer interrogation        Social History     Tobacco Use     Smoking status: Former Smoker     Packs/day: 0.00     Types: Cigarettes     Last attempt to quit: 2008     Years since quittin.4     Smokeless tobacco: Never Used   Substance Use Topics     Alcohol use: No     Drug use: No        Current Outpatient Medications   Medication Sig Dispense Refill     aspirin 81 MG EC tablet Take 81 mg by mouth daily.       calcium carbonate (CALCIUM CARBONATE) 300 mg (750 mg) Chew Chew 2-3 tablets as needed (heartburn).              coenzyme Q10 (CO Q-10) 200 mg capsule Take 200 mg by mouth 2 (two) times a day.       DIPHENHYDRAMINE HCL (BENADRYL ALLERGY ORAL) Take 25-50 mg by mouth every 6 (six) hours as needed (allergies).              fluticasone propionate (FLONASE) 50 mcg/actuation nasal spray USE 2 SPRAYS IN EACH NOSTRIL DAILY 48 g 4     isosorbide mononitrate (IMDUR) 30 MG 24 hr tablet Take 1 tablet (30 mg total) by mouth daily. 30 tablet 2     latanoprost (XALATAN) 0.005 % ophthalmic solution Administer 1 drop to both eyes at bedtime.              lisinopril (PRINIVIL,ZESTRIL) 5 MG tablet Take 1 tablet (5 mg total) by mouth daily. 90 tablet 3     metoprolol succinate (TOPROL-XL) 100 MG 24 hr tablet TAKE 1 TABLET AT BEDTIME 90 tablet 2     metoprolol succinate (TOPROL-XL) 50 MG 24 hr tablet TAKE 1 TABLET PLUS 1 TABLET  MG DAILY FOR TOTAL DOSE  MG (Patient taking differently: Take 50 mg by mouth every morning.       ) 90 tablet 2     multivitamin therapeutic tablet Take 1 tablet by mouth daily.       nitroglycerin  (NITROSTAT) 0.4 MG SL tablet Place 1 tablet (0.4 mg total) under the tongue every 5 (five) minutes as needed for chest pain. 100 tablet 3     omeprazole (PRILOSEC) 20 MG capsule Take 1 capsule (20 mg total) by mouth as needed. (Patient taking differently: Take 20 mg by mouth at bedtime.       ) 90 capsule 3     oxymetazoline (AFRIN) 0.05 % nasal spray Apply 2 sprays into each nostril daily as needed for congestion.       psyllium with dextrose (METAMUCIL JAR) powder Take by mouth 2 (two) times a day.              simvastatin (ZOCOR) 40 MG tablet TAKE 1 TABLET AT BEDTIME 90 tablet 0     traMADol (ULTRAM) 50 mg tablet Take 1-2 tablets ( mg total) by mouth every 6 (six) hours as needed for pain. Take 1-2 tablets every 6 hours as needed for pain 15 tablet 1     traZODone (DESYREL) 50 MG tablet Take 1 tablet (50 mg total) by mouth at bedtime. (Patient taking differently: Take 50 mg by mouth at bedtime as needed.       ) 90 tablet 3     No current facility-administered medications for this visit.           Objective:    Vitals:    10/29/19 1029   BP: 132/70   Pulse: 71   SpO2: 96%   Weight: (!) 229 lb (103.9 kg)      Body mass index is 32.86 kg/m .    Alert.  No apparent distress.  HEENT exam without icteric sclera.  Moist mixed membranes.  Neck supple.  Chest clear.  Cardiac exam regular.  Abdomen obese otherwise benign without hepatomegaly.  No abdominal mass.  No guarding or rebound.  Extremities warm and dry.  Malodor breath noted.        EXAM: CT ABDOMEN PELVIS W ORAL W IV CONTRAST  LOCATION: [facility]  DATE/TIME: 10/28/2019 3:44 PM     INDICATION: Abdominal pain, fever, post-op  COMPARISON: None.  TECHNIQUE: CT scan of the abdomen and pelvis was performed following injection of IV contrast. Multiplanar reformats were obtained. Dose reduction techniques were used.  CONTRAST: Iohexol (Omni) 100 mL     FINDINGS:   LOWER CHEST: Pacemaker leads extend into the coronary sinus and reside in the apex of the  right ventricle. No pericardial or pleural effusions. The imaged lung bases are clear.     HEPATOBILIARY: Cholecystectomy clips. Minimal stranding in the gallbladder fossa but no perihepatic fluid collection. The liver parenchyma is homogeneous attenuation. Normal contrast opacification of the portal veins.     The extrahepatic bile ducts are mildly dilated. The common bile duct measures 9-10 mm just above the pancreas head. Question sludge or within the lower common bile duct attenuation of the lowest portion of this common bile duct suggests stones or biliary   sludge (series 400, image 80).     PANCREAS: Fatty replaced but otherwise normal.     SPLEEN: Normal.     ADRENAL GLANDS: Normal.     KIDNEYS/BLADDER: The left kidney is smaller than the right. Symmetric cortical enhancement. No hydronephrosis. The urinary bladder is incompletely distended.     BOWEL: Normal appendix. No bowel wall thickening. Sigmoid diverticulosis. No pericolonic inflammatory stranding.     LYMPH NODES: Normal.     VASCULATURE: Mild mixed calcified and noncalcified atheroma in the abdominal aorta and iliac arteries. No aortoiliac aneurysm.     PELVIC ORGANS: The prostate gland is not enlarged. Symmetric seminal vesicles. No pelvic free fluid.     MUSCULOSKELETAL: Lower thoracic and upper lumbar Schmorl's nodes. Vacuum disc phenomenon focally at L5-S1. Mild lumbar degenerative disc disease but no fractures or destructive bone lesions.     OTHER: None.     IMPRESSION:      1.  Status post cholecystectomy. No fluid collection the gallbladder fossa.  2.  Mildly dilated common bile duct measuring 9 to 10 mm with question radiopaque stones or biliary sludge in the CBD just above the pancreatic head.      This note has been dictated using voice recognition software and as a result may contain minor grammatical errors and unintended word substitutions.

## 2021-06-03 VITALS
BODY MASS INDEX: 32.86 KG/M2 | WEIGHT: 229 LBS | DIASTOLIC BLOOD PRESSURE: 70 MMHG | HEART RATE: 71 BPM | SYSTOLIC BLOOD PRESSURE: 132 MMHG | OXYGEN SATURATION: 96 %

## 2021-06-03 VITALS
HEART RATE: 86 BPM | DIASTOLIC BLOOD PRESSURE: 72 MMHG | WEIGHT: 249.9 LBS | BODY MASS INDEX: 34.98 KG/M2 | HEIGHT: 71 IN | OXYGEN SATURATION: 93 % | RESPIRATION RATE: 16 BRPM | SYSTOLIC BLOOD PRESSURE: 142 MMHG

## 2021-06-03 VITALS
SYSTOLIC BLOOD PRESSURE: 138 MMHG | WEIGHT: 228 LBS | OXYGEN SATURATION: 98 % | DIASTOLIC BLOOD PRESSURE: 70 MMHG | HEART RATE: 81 BPM | BODY MASS INDEX: 32.71 KG/M2

## 2021-06-03 VITALS
DIASTOLIC BLOOD PRESSURE: 82 MMHG | HEART RATE: 62 BPM | SYSTOLIC BLOOD PRESSURE: 140 MMHG | WEIGHT: 248 LBS | RESPIRATION RATE: 16 BRPM | DIASTOLIC BLOOD PRESSURE: 88 MMHG | HEIGHT: 70 IN | HEART RATE: 76 BPM | HEIGHT: 70 IN | BODY MASS INDEX: 35.07 KG/M2 | BODY MASS INDEX: 35.5 KG/M2 | WEIGHT: 245 LBS | SYSTOLIC BLOOD PRESSURE: 124 MMHG | OXYGEN SATURATION: 98 %

## 2021-06-03 VITALS — BODY MASS INDEX: 33.86 KG/M2 | WEIGHT: 236 LBS

## 2021-06-03 VITALS
DIASTOLIC BLOOD PRESSURE: 70 MMHG | SYSTOLIC BLOOD PRESSURE: 130 MMHG | HEART RATE: 80 BPM | WEIGHT: 231 LBS | OXYGEN SATURATION: 97 % | BODY MASS INDEX: 33.15 KG/M2

## 2021-06-03 VITALS — WEIGHT: 235 LBS | BODY MASS INDEX: 32.78 KG/M2

## 2021-06-03 NOTE — PROGRESS NOTES
"Assessment/Plan:    1. Abdominal pain, epigastric  Abdominal pain, resolved.  Associated LFT elevation with noted hepatitis likely secondary to choledocholithiasis.  Symptomatic improvement noted.  Continues to abstain from alcohol.  Repeat lab testing as noted below.  Notify with results.  Consider ERCP or endoscopic ultrasound if pain reoccurs or LFTs remain elevated.    2. Hepatitis  As above, repeat labs regarding recent hepatitis likely secondary to documented choledocholithiasis.  Symptomatic resolution.  Confirm lab improvement as anticipated.  - Ammonia  - INR  - Lipase  - GGT (Gamma GT)  - HM2(CBC w/o Differential)  - Comprehensive Metabolic Panel    3. Abnormal LFTs  As above.  - Ammonia  - INR  - Lipase  - GGT (Gamma GT)  - HM2(CBC w/o Differential)  - Comprehensive Metabolic Panel        Subjective:    Víctor Calderon is seen today for follow-up evaluation.  Recent laparoscopic cholecystectomy for symptomatic gallstones noted.  Some persistent abdominal pain with noted elevated liver function tests present.  Mild biliary dilatation and choledocholithiasis found on CT scan.  LFT elevation noted with , , alkaline phosphatase 379, lipase 25, ammonia 39 and INR 1.47.  Had been seen by Dr. Laguerre with [facility].  Consideration for ERCP or endoscopic ultrasound if persistent pain or recurrent concerns for LFT elevation etc.  No bleeding issues at this time.  Urine is no longer dark.  Stools no longer light-colored.  Feeling great.  Her cognitive functioning appears improved.  Less confused.  No nausea.  Watching his weight and is losing weight and would like to achieve weight goal under 210 pounds initially, less than 200 pounds ideally.  No bloating.  Comprehensive review of systems as above otherwise all negative.     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 left s/p T & A as child; dual chamber pacemaker placement on 2/9/15 after cardiac arrest.  Quit EtOH 2/27/02   ROMELIA on CPAP at 8 cm H2O  PHQ-9 score=4/27 (9/22/10)     Past Surgical History:   Procedure Laterality Date     CARDIAC CATHETERIZATION       COLONOSCOPY N/A 9/30/2015    Procedure: COLONOSCOPY;  Surgeon: Rafiq Gu MD;  Location: [facility];  Service:      HERNIA REPAIR      inguinal      INSERT / REPLACE / REMOVE PACEMAKER       LAPAROSCOPIC CHOLECYSTECTOMY N/A 10/2/2019    Procedure: CHOLECYSTECTOMY, LAPAROSCOPIC;  Surgeon: Shin Baird MD;  Location: [facility] OR;  Service: General     polyp  2010    removed during colonoscopy     [place] REMOVAL OF SPERM DUCT(S)      Description: Surgery Of Male Genitalia Vasectomy;  Recorded: 02/22/2008;     [place] REMOVE TONSILS/ADENOIDS,<11 Y/O      Description: Tonsillectomy With Adenoidectomy;  Recorded: 02/22/2008;     TONSILLECTOMY       TUMOR REMOVAL  2008    right vocal cord -         Family History   Problem Relation Age of Onset     Pneumonia Mother      Hypertension Mother      Cancer Father      COPD Sister      Multiple sclerosis Sister      Acute Myocardial Infarction Neg Hx         Past Medical History:   Diagnosis Date     Cholelithiasis      Coronary artery disease      Dermatitis      GERD (gastroesophageal reflux disease)      H/O cardiac arrest 2/2015    alka/torsades     Hyperlipidemia      Hypertension      Hypogonadism male      Male erectile disorder      Obesity      Sleep apnea     CPAP     Ventricular tachycardia (H)     seen on pacer interrogation        Social History     Tobacco Use     Smoking status:  Former Smoker     Packs/day: 0.00     Types: Cigarettes     Last attempt to quit: 2008     Years since quittin.4     Smokeless tobacco: Never Used   Substance Use Topics     Alcohol use: No     Drug use: No        Current Outpatient Medications   Medication Sig Dispense Refill     aspirin 81 MG EC tablet Take 81 mg by mouth daily.       calcium carbonate (CALCIUM CARBONATE) 300 mg (750 mg) Chew Chew 2-3 tablets as needed (heartburn).              coenzyme Q10 (CO Q-10) 200 mg capsule Take 200 mg by mouth 2 (two) times a day.       DIPHENHYDRAMINE HCL (BENADRYL ALLERGY ORAL) Take 25-50 mg by mouth every 6 (six) hours as needed (allergies).              fluticasone propionate (FLONASE) 50 mcg/actuation nasal spray USE 2 SPRAYS IN EACH NOSTRIL DAILY 48 g 4     isosorbide mononitrate (IMDUR) 30 MG 24 hr tablet Take 1 tablet (30 mg total) by mouth daily. 30 tablet 2     latanoprost (XALATAN) 0.005 % ophthalmic solution Administer 1 drop to both eyes at bedtime.              lisinopril (PRINIVIL,ZESTRIL) 5 MG tablet Take 1 tablet (5 mg total) by mouth daily. 90 tablet 3     metoprolol succinate (TOPROL-XL) 100 MG 24 hr tablet TAKE 1 TABLET AT BEDTIME 90 tablet 2     metoprolol succinate (TOPROL-XL) 50 MG 24 hr tablet TAKE 1 TABLET PLUS 1 TABLET  MG DAILY FOR TOTAL DOSE  MG (Patient taking differently: Take 50 mg by mouth every morning.       ) 90 tablet 2     multivitamin therapeutic tablet Take 1 tablet by mouth daily.       nitroglycerin (NITROSTAT) 0.4 MG SL tablet Place 1 tablet (0.4 mg total) under the tongue every 5 (five) minutes as needed for chest pain. 100 tablet 3     omeprazole (PRILOSEC) 20 MG capsule Take 1 capsule (20 mg total) by mouth as needed. (Patient taking differently: Take 20 mg by mouth at bedtime.       ) 90 capsule 3     oxymetazoline (AFRIN) 0.05 % nasal spray Apply 2 sprays into each nostril daily as needed for congestion.       psyllium with dextrose (METAMUCIL JAR) powder  Take by mouth 2 (two) times a day.              simvastatin (ZOCOR) 40 MG tablet TAKE 1 TABLET AT BEDTIME 90 tablet 0     traMADol (ULTRAM) 50 mg tablet Take 1-2 tablets ( mg total) by mouth every 6 (six) hours as needed for pain. Take 1-2 tablets every 6 hours as needed for pain 15 tablet 1     traZODone (DESYREL) 50 MG tablet Take 1 tablet (50 mg total) by mouth at bedtime. (Patient taking differently: Take 50 mg by mouth at bedtime as needed.       ) 90 tablet 3     No current facility-administered medications for this visit.           Objective:    Vitals:    11/08/19 1417   BP: 138/70   Pulse: 81   SpO2: 98%   Weight: (!) 228 lb (103.4 kg)      Body mass index is 32.71 kg/m .    Alert.  No apparent distress.  Chest clear.  Cardiac exam regular.  Abdomen benign.  Positive bowel sounds.  No guarding or rebound.  No hepatomegaly.  Extremities warm and dry.      This note has been dictated using voice recognition software and as a result may contain minor grammatical errors and unintended word substitutions.

## 2021-06-04 VITALS
TEMPERATURE: 98 F | DIASTOLIC BLOOD PRESSURE: 83 MMHG | SYSTOLIC BLOOD PRESSURE: 146 MMHG | BODY MASS INDEX: 33.05 KG/M2 | HEART RATE: 76 BPM | WEIGHT: 237 LBS | OXYGEN SATURATION: 98 %

## 2021-06-04 VITALS
SYSTOLIC BLOOD PRESSURE: 139 MMHG | WEIGHT: 242 LBS | HEART RATE: 69 BPM | BODY MASS INDEX: 33.75 KG/M2 | DIASTOLIC BLOOD PRESSURE: 85 MMHG | OXYGEN SATURATION: 98 %

## 2021-06-04 VITALS
SYSTOLIC BLOOD PRESSURE: 130 MMHG | HEIGHT: 70 IN | OXYGEN SATURATION: 95 % | DIASTOLIC BLOOD PRESSURE: 70 MMHG | HEART RATE: 78 BPM | BODY MASS INDEX: 32.64 KG/M2 | WEIGHT: 228 LBS

## 2021-06-04 VITALS — WEIGHT: 243 LBS | HEIGHT: 71 IN | BODY MASS INDEX: 34.02 KG/M2

## 2021-06-04 VITALS
SYSTOLIC BLOOD PRESSURE: 136 MMHG | WEIGHT: 233.2 LBS | RESPIRATION RATE: 16 BRPM | HEART RATE: 60 BPM | DIASTOLIC BLOOD PRESSURE: 70 MMHG | BODY MASS INDEX: 33.46 KG/M2 | OXYGEN SATURATION: 97 %

## 2021-06-04 NOTE — PROGRESS NOTES
Assessment and Plan:       1. Encounter for general adult medical examination with abnormal findings  Annual wellness visit completed.  Risks associate with lack of exercise, suboptimal diet, hearing loss as well as need for completion of advanced directives.  Did recommend Shingrix immunization series through local pharmacy otherwise immunizations reviewed and about 8.  Prior colonoscopy September 30, 2015 with recommendation for 5-year follow-up.  History of sigmoid diverticulosis noted.    2. Class 1 obesity due to excess calories with serious comorbidity and body mass index (BMI) of 32.0 to 32.9 in adult  Dietary and exercise modification for weight goal less than 210 pounds initially, less than 200 pounds ideally.    3. Coronary artery disease involving native coronary artery of native heart without angina pectoris  History of CAD status post cardiac arrest secondary to bradycardia mediated torsades January 2015 status post CRT-D device placement.  Follows with Dr. Sarmiento and will follow up on annual basis September 2020.  - Lipid Cascade    4. Ischemic cardiomyopathy  Recent EF 51%.  Stable.  Follows with Dr. Sarmiento.    5. Paroxysmal atrial fibrillation (H)  History of single episode of paroxysmal atrial fibrillation after gallbladder attack.  Does not require anticoagulation going forward at this time.    6. Essential hypertension with goal blood pressure less than 130/80  Hypertension stable.  Metoprolol succinate 150 mg daily.  Lisinopril 5 mg daily.  Med monitoring completed.  - Comprehensive Metabolic Panel    7. Impaired fasting glucose  Fasting glucose and A1c obtained.  Dietary and exercise modification reviewed as noted above for weight goal less than 210 pounds initially, less than 200 pounds ideally.  - Comprehensive Metabolic Panel  - Glycosylated Hemoglobin A1c    8. ROMELIA on CPAP  Continue CPAP 10 cm water pressure for ROMELIA management.  Tolerating well.    9. Gastroesophageal reflux disease without  esophagitis  GERD, stable.    10. Male Erectile Disorder  History of male erectile dysfunction.    11. Abnormal LFTs  History of LFT elevation associated with choledocholithiasis.  Patient is status post laparoscopic cholecystectomy.  Ensure resolved LFT elevation.  - Comprehensive Metabolic Panel  - GGT (Gamma GT)  - HM2(CBC w/o Differential)    12. Elevated serum GGT level  Ensure resolved LFT elevation following prior lap merced with noted choledocholithiasis.  - GGT (Gamma GT)        The patient's current medical problems were reviewed.    I have had an Advance Directives discussion with the patient.  The following high BMI interventions were performed this visit: encouragement to exercise, weight monitoring, weight loss from baseline weight and lifestyle education regarding diet.  Ensure ongoing efforts to achieve weight goal < 210 pounds initially, < 200 pounds ideally.     The following health maintenance schedule was reviewed with the patient and provided in printed form in the after visit summary:   Health Maintenance   Topic Date Due     ZOSTER VACCINES (2 of 3) 10/28/2008     FALL RISK ASSESSMENT  12/12/2019     MEDICARE ANNUAL WELLNESS VISIT  12/12/2019     COLONOSCOPY  09/30/2020     LIPID  12/12/2023     ADVANCE CARE PLANNING  12/12/2023     TD 18+ HE  06/12/2029     HEPATITIS C SCREENING  Completed     PNEUMOCOCCAL IMMUNIZATION 65+ LOW/MEDIUM RISK  Completed     INFLUENZA VACCINE RULE BASED  Completed        Subjective:     Chief Complaint: Víctor Calderon is an 74 y.o. male here for an Annual Wellness visit.     HPI: In general doing well.  Recent episode of cholecystitis with choledocholithiasis with LFT elevation noted.  ALT elevation 276 improved to 71 at prior office visit as well as AST level of 346 improved to 39.  Bilirubin was 1.5.  GGT elevation at 357.  Maintains abstinence from alcohol times years.  History of CAD with cardiomyopathy with prior EF 51%.  Lisinopril 5 mg daily.  Hypertension  "managed with lisinopril as well as metoprolol succinate 150 mg daily.  Simvastatin 40 mg at bedtime for lipid management.  Has tinea cruris concerns.  Using OTC antifungal cream perhaps once a day.  Improvement will note and then recurrent symptoms.  One episode of paroxysmal atrial fibrillation following gallbladder attack.  Status post cardiac arrest likely bradycardia mediated torsades 2015.  Status post CRT-D device placement.  Doing well.   Obstructive sleep apnea on CPAP 10 cwp.     Review of Systems:  Please see above.  The rest of the review of systems are negative for all systems.     \"Nikole\" x 36+ years (she  in 2012 due to acute MI)   Fiance - \"Elvira\" (Erma) - she is a retired LPN on psyche unit at 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 left s/p T & A as child; dual chamber pacemaker placement on 2/9/15 after cardiac arrest.  Quit EtOH 02   ROMELIA on CPAP at 8 cm H2O  PHQ-9 score= (9/22/10)     Patient Care Team:  Adams Garcia MD as PCP - General  Adams Garcia MD as Assigned PCP     Patient Active Problem List   Diagnosis     Skin Neoplasm Of Uncertain Behavior     Hypercholesterolemia     Obstructive Sleep Apnea     Esophageal Reflux     Impaired Fasting Glucose     Changed Sexual Interest (Libido): Decreased     Cholelithiasis     Laryngeal Neoplasm Of The Vocal Cord     Hypertension     Dermatitis     Hoarseness     Male Erectile Disorder     Hypogonadism     Insomnia     Benign Tubular Adenoma Of The Large Intestine     Class 1 obesity due to excess calories with serious comorbidity and body mass index (BMI) of 31.0 to 31.9 in adult     Skin Tag (___ Mm)     " Tachycardia     Bradycardia     Cardiac arrest (H)     Heart block AV complete (H)     Right ventricular dysfunction     CAD (coronary artery disease)     Normochromic normocytic anemia     NSVT (nonsustained ventricular tachycardia) (H)     Ischemic cardiomyopathy     Complete AV block, acquired (H)     Adenomatous colon polyp     Tubular adenoma of colon     ROMELIA on CPAP     ICD (implantable cardioverter-defibrillator), biventricular, in situ     Bilateral hearing loss     Erectile dysfunction, unspecified erectile dysfunction type     Cholelithiasis     Paroxysmal atrial fibrillation (H)     Past Medical History:   Diagnosis Date     Cholelithiasis      Coronary artery disease      Dermatitis      GERD (gastroesophageal reflux disease)      H/O cardiac arrest 2/2015    alka/torsades     Hyperlipidemia      Hypertension      Hypogonadism male      Male erectile disorder      Obesity      Sleep apnea     CPAP     Ventricular tachycardia (H)     seen on pacer interrogation      Past Surgical History:   Procedure Laterality Date     CARDIAC CATHETERIZATION       COLONOSCOPY N/A 9/30/2015    Procedure: COLONOSCOPY;  Surgeon: Rafiq Gu MD;  Location: [facility];  Service:      HERNIA REPAIR      inguinal      INSERT / REPLACE / REMOVE PACEMAKER       LAPAROSCOPIC CHOLECYSTECTOMY N/A 10/2/2019    Procedure: CHOLECYSTECTOMY, LAPAROSCOPIC;  Surgeon: Shin Baird MD;  Location: [facility] OR;  Service: General     polyp  2010    removed during colonoscopy     [place] REMOVAL OF SPERM DUCT(S)      Description: Surgery Of Male Genitalia Vasectomy;  Recorded: 02/22/2008;     [place] REMOVE TONSILS/ADENOIDS,<11 Y/O      Description: Tonsillectomy With Adenoidectomy;  Recorded: 02/22/2008;     TONSILLECTOMY       TUMOR REMOVAL  2008    right vocal cord -       Family History   Problem Relation Age of Onset     Pneumonia Mother      Hypertension Mother      Cancer Father      COPD Sister      Multiple sclerosis  Sister      Acute Myocardial Infarction Neg Hx       Social History     Socioeconomic History     Marital status:      Spouse name: Not on file     Number of children: Not on file     Years of education: Not on file     Highest education level: Not on file   Occupational History     Not on file   Social Needs     Financial resource strain: Not on file     Food insecurity:     Worry: Not on file     Inability: Not on file     Transportation needs:     Medical: Not on file     Non-medical: Not on file   Tobacco Use     Smoking status: Former Smoker     Packs/day: 0.00     Types: Cigarettes     Last attempt to quit: 2008     Years since quittin.5     Smokeless tobacco: Never Used   Substance and Sexual Activity     Alcohol use: No     Drug use: No     Sexual activity: Not on file   Lifestyle     Physical activity:     Days per week: Not on file     Minutes per session: Not on file     Stress: Not on file   Relationships     Social connections:     Talks on phone: Not on file     Gets together: Not on file     Attends Sabianism service: Not on file     Active member of club or organization: Not on file     Attends meetings of clubs or organizations: Not on file     Relationship status: Not on file     Intimate partner violence:     Fear of current or ex partner: Not on file     Emotionally abused: Not on file     Physically abused: Not on file     Forced sexual activity: Not on file   Other Topics Concern     Not on file   Social History Narrative     Not on file      Current Outpatient Medications   Medication Sig Dispense Refill     aspirin 81 MG EC tablet Take 81 mg by mouth daily.       calcium carbonate (CALCIUM CARBONATE) 300 mg (750 mg) Chew Chew 2-3 tablets as needed (heartburn).              coenzyme Q10 (CO Q-10) 200 mg capsule Take 200 mg by mouth 2 (two) times a day.       DIPHENHYDRAMINE HCL (BENADRYL ALLERGY ORAL) Take 25-50 mg by mouth every 6 (six) hours as needed (allergies).           "    fluticasone propionate (FLONASE) 50 mcg/actuation nasal spray USE 2 SPRAYS IN EACH NOSTRIL DAILY 48 g 4     latanoprost (XALATAN) 0.005 % ophthalmic solution Administer 1 drop to both eyes at bedtime.              lisinopril (PRINIVIL,ZESTRIL) 5 MG tablet Take 1 tablet (5 mg total) by mouth daily. 90 tablet 3     metoprolol succinate (TOPROL-XL) 100 MG 24 hr tablet TAKE 1 TABLET AT BEDTIME 90 tablet 2     metoprolol succinate (TOPROL-XL) 50 MG 24 hr tablet TAKE 1 TABLET PLUS 1 TABLET  MG DAILY FOR TOTAL DOSE  MG (Patient taking differently: Take 50 mg by mouth every morning.       ) 90 tablet 2     multivitamin therapeutic tablet Take 1 tablet by mouth daily.       nitroglycerin (NITROSTAT) 0.4 MG SL tablet Place 1 tablet (0.4 mg total) under the tongue every 5 (five) minutes as needed for chest pain. 100 tablet 3     omeprazole (PRILOSEC) 20 MG capsule Take 1 capsule (20 mg total) by mouth as needed. (Patient taking differently: Take 20 mg by mouth at bedtime.       ) 90 capsule 3     oxymetazoline (AFRIN) 0.05 % nasal spray Apply 2 sprays into each nostril daily as needed for congestion.       psyllium with dextrose (METAMUCIL JAR) powder Take by mouth 2 (two) times a day.              simvastatin (ZOCOR) 40 MG tablet TAKE 1 TABLET AT BEDTIME 90 tablet 0     traMADol (ULTRAM) 50 mg tablet Take 1-2 tablets ( mg total) by mouth every 6 (six) hours as needed for pain. Take 1-2 tablets every 6 hours as needed for pain 15 tablet 1     traZODone (DESYREL) 50 MG tablet Take 1 tablet (50 mg total) by mouth at bedtime. (Patient taking differently: Take 50 mg by mouth at bedtime as needed.       ) 90 tablet 3     isosorbide mononitrate (IMDUR) 30 MG 24 hr tablet Take 1 tablet (30 mg total) by mouth daily. 30 tablet 2     No current facility-administered medications for this visit.       Objective:   Vital Signs:   Visit Vitals  /70   Pulse 78   Ht 5' 10\" (1.778 m)   Wt (!) 228 lb (103.4 kg) "   SpO2 95%   BMI 32.71 kg/m         VisionScreening:  No exam data present     PHYSICAL EXAM    General Appearance:    Alert, cooperative, no distress, appears stated age.  Obesity with BMI 32.71   Head:    Normocephalic, without obvious abnormality, atraumatic   Eyes:    PERRL, conjunctiva/corneas clear, EOM's intact, fundi     benign, both eyes        Ears:    Normal TM's and external ear canals, both ears   Nose:   Nares normal, septum midline, mucosa normal, no drainage    or sinus tenderness   Throat:   Lips, mucosa, and tongue normal; teeth and gums normal   Neck:   Supple, symmetrical, trachea midline, no adenopathy;        thyroid:  No enlargement/tenderness/nodules; no carotid    bruit or JVD   Back:     Symmetric, no curvature, ROM normal, no CVA tenderness   Lungs:     Clear to auscultation bilaterally, respirations unlabored   Chest wall:    No tenderness or deformity   Heart:    Regular rate and rhythm, S1 and S2 normal, no murmur, rub   or gallop   Abdomen:     Soft, non-tender, bowel sounds active all four quadrants,     no masses, no organomegaly.     Genitalia:    Normal male without lesion, discharge or tenderness.  No inguinal hernia noted.     Rectal:    Normal tone.  Prostate normal/symmetric, no masses or tenderness.   Extremities:   Extremities normal, atraumatic, no cyanosis or edema   Pulses:   2+ and symmetric all extremities   Skin:   Skin color, texture, turgor normal, no rashes or lesions.  Mild tinea cruris bilateral.   Lymph nodes:   Cervical, supraclavicular, and axillary nodes normal   Neurologic:   CNII-XII intact. Normal strength, sensation and reflexes       throughout        Assessment Results 12/12/2019   Activities of Daily Living No help needed   Instrumental Activities of Daily Living No help needed   Get Up and Go Score Less than 12 seconds   Mini Cog Total Score 5   Some recent data might be hidden     A Mini-Cog score of 0-2 suggests the possibility of dementia, score of  3-5 suggests no dementia    Identified Health Risks:     He is at risk for lack of exercise and has been provided with information to increase physical activity for the benefit of his well-being.  The patient was counseled and encouraged to consider modifying their diet and eating habits. He was provided with information on recommended healthy diet options.  The patient was provided with written information regarding signs of hearing loss.  Information regarding advance directives (living zafar), including where he can download the appropriate form, was provided to the patient via the AVS.         Echo 8/23/18 Summary     Left ventricle ejection fraction is mildly decreased. The calculated left ventricular ejection fraction is 51%.    Normal left ventricular size.    Normal right ventricular size and systolic function.    Aortic Valve: The aortic valve is sclerotic without reduced excursion. Mild aortic regurgitation.    No hemodynamically significant valvular heart abnormalities.    When compared to the previous study dated 1/4/2016, no significant change.

## 2021-06-05 VITALS
WEIGHT: 249 LBS | OXYGEN SATURATION: 96 % | HEART RATE: 85 BPM | HEIGHT: 71 IN | TEMPERATURE: 97 F | BODY MASS INDEX: 34.86 KG/M2 | DIASTOLIC BLOOD PRESSURE: 85 MMHG | SYSTOLIC BLOOD PRESSURE: 140 MMHG

## 2021-06-05 NOTE — TELEPHONE ENCOUNTER
"Pt states \"Was giving my dog a treat.\"  \"Stuck my thumb too far into his mouth.\"  \"When he bit into the treat, he accidentally bit into my thumbnail.\"  \"Tiny puncture wound\" -> \"maybe 1/16th of an inch.\"  Bled for a short time; bleeding stopped promptly.  No pain.    Tetanus last dated June 2019.  Due to puncture through nail (even though tiny and no pain), clinical eval is advised to rule out hematoma or need for antibiotics.  Pt agrees.  Warm transferred to a  for this purpose now.    Brigitte Campoverde RN  [facility] Triage     Reason for Disposition    Puncture wound or small cut on hands or genitals    Protocols used: ANIMAL BITE-A-OH      "

## 2021-06-05 NOTE — PROGRESS NOTES
ASSESSMENT/ PLAN      Víctor was seen today for animal bite.    Dog bite, initial encounter  Puncture wound to left thumb nail, no bleeding, no overlying cellulitis or any other significant exam finding.  Will empirically treat with Augmentin however.  Patient is up-to-date with tetanus. Discussed signs and symptoms to return to clinic for further evaluation despite being on antibiotics.   -     amoxicillin-clavulanate (AUGMENTIN) 875-125 mg per tablet; Take 1 tablet by mouth 2 (two) times a day for 7 days.          This note was created using Dragon dictation software, spelling errors may occur.     Nury Shah MD        SUBJECTIVE   Víctor Calderon is a 74 y.o. old male who presented to clinic today for further evaluation of dog bite on his left fingernail.  Patient reports that it occurred at 8 AM this morning.  He states that he was giving his dog treat and he stuck his thumb too far into his mouth when the dog bit into the tray accidentally bit into patient's left thumbnail and patient reports sustaining a tiny puncture wound that was not painful initially but as the day went on and started becoming painful.  Patient reports that it did bleed for short time but this stopped very promptly.  Last tetanus was June 2019.  He denies any fever, chills, nausea vomiting loss of sensation no weakness to his left hand or upper extremity.  He otherwise feels well without any other symptoms.    Review of Systems:  Negative except as noted in HPI    The following portions of the patient's history were reviewed and updated as appropriate: past medical history, past surgical history, family history, allergies, current medications and problem list.    Medical History  Active Ambulatory (Non-Hospital) Problems    Diagnosis     Paroxysmal atrial fibrillation (H)     Cholelithiasis     Bilateral hearing loss     Erectile dysfunction, unspecified erectile dysfunction type     ICD (implantable cardioverter-defibrillator),  biventricular, in situ     Tubular adenoma of colon     ROMELIA on CPAP     Adenomatous colon polyp     Complete AV block, acquired (H)     NSVT (nonsustained ventricular tachycardia) (H)     Ischemic cardiomyopathy     Normochromic normocytic anemia     CAD (coronary artery disease)     Heart block AV complete (H)     Right ventricular dysfunction     Cardiac arrest (H)     Bradycardia     Benign Tubular Adenoma Of The Large Intestine     Class 1 obesity due to excess calories with serious comorbidity and body mass index (BMI) of 31.0 to 31.9 in adult     Skin Tag (___ Mm)     Tachycardia     Insomnia     Skin Neoplasm Of Uncertain Behavior     Hypercholesterolemia     Obstructive Sleep Apnea     Esophageal Reflux     Impaired Fasting Glucose     Changed Sexual Interest (Libido): Decreased     Cholelithiasis     Laryngeal Neoplasm Of The Vocal Cord     Hypertension     Dermatitis     Hoarseness     Male Erectile Disorder     Hypogonadism     Past Medical History:   Diagnosis Date     Cholelithiasis      Coronary artery disease      Dermatitis      GERD (gastroesophageal reflux disease)      H/O cardiac arrest 2/2015     Hyperlipidemia      Hypertension      Hypogonadism male      Male erectile disorder      Obesity      Sleep apnea      Ventricular tachycardia (H)        Surgical History  He  has a past surgical history that includes pr remove tonsils/adenoids,<12 y/o; pr removal of sperm duct(s); Cardiac catheterization; Insert / replace / remove pacemaker; Hernia repair; Tumor removal (2008); polyp (2010); Colonoscopy (N/A, 9/30/2015); Tonsillectomy; and Laparoscopic cholecystectomy (N/A, 10/2/2019).    Social History  Reviewed, and he  reports that he quit smoking about 11 years ago. His smoking use included cigarettes. He smoked 0.00 packs per day. He has never used smokeless tobacco. He reports that he does not drink alcohol or use drugs.    Family History  Reviewed, and family history includes COPD in his sister;  Cancer in his father; Hypertension in his mother; Multiple sclerosis in his sister; Pneumonia in his mother.    Medications  Reviewed and reconciled    Allergies  Allergies   Allergen Reactions     Zithromax [Azithromycin] Rash         OBJECTIVE  Physical Exam:  Vital signs: /70 (Patient Site: Left Arm, Patient Position: Sitting, Cuff Size: Adult Large)   Pulse 60   Resp 16   Wt (!) 233 lb 3.2 oz (105.8 kg)   SpO2 97%   BMI 33.46 kg/m    Weight: (!) 233 lb 3.2 oz (105.8 kg)    General appearance: pleasant, appears stated age, cooperative and in no distress  Lymph: no cervical/supraclavicular adenopathy  Respiratory: clear to auscultation bilaterally, good air movement throughout, no wheezing or crackles, speaking full sentences without difficulty  Cardiovascular: regular rate and rhythm, no murmur appreciated, no leg edema  Skin: no rashes; tiny puncture wound to the left thumb nail, non bleeding, mildly tender to touch.   Neuro: alert oriented x 3, grossly normal otherwise  Psych: normal affect, appropriate conversation

## 2021-06-08 NOTE — PROGRESS NOTES
Clinic Care Coordination Contact  Community Health Worker Initial Outreach    CHW Initial Information Gathering:  Referral Source: Care Team  Informal Support system:: Significant other    Patient accepts CC: No, has great support with his fiance who is a retired nurse.         Comments   [facility] referral. Patient in hospital for abdominal pain post merced and history of cardiomyopathy. Needing oxygen in hospital. [facility] for resources and medication management.   Request History

## 2021-06-08 NOTE — ANESTHESIA PREPROCEDURE EVALUATION
Anesthesia Evaluation      Patient summary reviewed   No history of anesthetic complications     Airway   Mallampati: II   Pulmonary - normal exam   (+) sleep apnea,                          Cardiovascular   (+) pacemaker (ICD), hypertension, CAD, dysrhythmias (hx of NSVT and severe bradycardia, s/p pacemaker/ICD), , hypercholesterolemia,     ECG reviewed     PE comment: paced,     Neuro/Psych - negative ROS     Endo/Other    (+) obesity,      GI/Hepatic/Renal    (+) GERD,             Dental - normal exam                        Anesthesia Plan  Planned anesthetic: general endotracheal  Place magnet on ICD pre-flip  ASA 3   Induction: intravenous   Anesthetic plan and risks discussed with: patient  Anesthesia plan special considerations: video-assisted, rapid sequence induction,   Post-op plan: routine recovery

## 2021-06-08 NOTE — TELEPHONE ENCOUNTER
Refill Approved    Rx renewed per Medication Renewal Policy. Medication was last renewed on 9/17/2019.    Bethany Mahajan, [facility] Connection Triage/Med Refill 6/13/2020     Requested Prescriptions   Pending Prescriptions Disp Refills     traZODone (DESYREL) 50 MG tablet [Pharmacy Med Name: TRAZODONE HCL TABS 50MG] 90 tablet 3     Sig: TAKE 1 TABLET AT BEDTIME       Tricyclics/Misc Antidepressant/Antianxiety Meds Refill Protocol Passed - 6/11/2020  8:21 PM        Passed - PCP or prescribing provider visit in last year     Last office visit with prescriber/PCP: 11/8/2019 Adams Garcia MD OR same dept: 11/8/2019 Adams Garcia MD OR same specialty: 2/7/2020 Nury Shah MD  Last physical: 12/12/2019 Last MTM visit: Visit date not found   Next visit within 3 mo: Visit date not found  Next physical within 3 mo: Visit date not found  Prescriber OR PCP: Adams Garcia MD  Last diagnosis associated with med order: 1. Insomnia  - traZODone (DESYREL) 50 MG tablet [Pharmacy Med Name: TRAZODONE HCL TABS 50MG]; TAKE 1 TABLET AT BEDTIME  Dispense: 90 tablet; Refill: 3    If protocol passes may refill for 12 months if within 3 months of last provider visit (or a total of 15 months).

## 2021-06-08 NOTE — TELEPHONE ENCOUNTER
I sent refill for the 50 mg tablets to use as needed since it appears that he took this dose before. If this dose is not adequate please have him follow up with us. He should avoid use with nitroglycerin as this can be very dangerous.

## 2021-06-08 NOTE — PROGRESS NOTES
"Víctor Calderon is a 74 y.o. male who is being evaluated via a billable video visit.      The patient has been notified of following:     \"This video visit will be conducted via a call between you and your physician/provider. We have found that certain health care needs can be provided without the need for an in-person physical exam.  This service lets us provide the care you need with a video conversation.  If a prescription is necessary we can send it directly to your pharmacy.  If lab work is needed we can place an order for that and you can then stop by our lab to have the test done at a later time.    Video visits are billed at different rates depending on your insurance coverage. Please reach out to your insurance provider with any questions.    If during the course of the call the physician/provider feels a video visit is not appropriate, you will not be charged for this service.\"    Patient has given verbal consent to a Video visit? Yes    Patient would like to receive their AVS by AVS Preference: Mail a copy.    Patient would like the video invitation sent by: Text to cell phone: [phone]    Will anyone else be joining your video visit? No          Reviewed hospital d/c summary from 5/11/20:  74 y.o. old male with a history of paroxysmal atrial fibrillation, cholelithiasis status post cholecystectomy, ICD/pacer, ischemic cardiomyopathy, ROMELIA on CPAP, CAD, MI 2015 that presents with left upper quadrant/epigastric abdominal pain that began 5/9.     Choledocholithiasis  -Status post ERCP 5/10, 3 small stones swept from the duct with improvement in abdominal pain.   -tolerating regular diet at time of discharge  -No NSAIDs or anticoagulation for 72 hours per GI  -Monitor for pancreatitis     Atrial fibrillation/CAD/ischemic cardiomyopathy/history of MI/HTN  - patient has AICD pacer currently pacing appropriately  - Not on anticoagulation  -Continue home metoprolol 100 mg at bedtime and 50 mg a.m., lisinopril " 5 mg daily, aspirin tomorrow  -Continue simvastatin     Hypoxia  -Is unclear why he was hypoxic coming in. possibly due to splinting from pain however today without pain he is saturating normally on room air.     Insomnia/anxiety  -Continue home trazodone 50 mg at bedtime    Video Start Time: 3:11 PM    Additional provider notes: GENERAL: Healthy, alert and no distress  EYES: Eyes grossly normal to inspection. No discharge or erythema, or obvious scleral/conjunctival abnormalities.  RESP: No audible wheeze, cough, or visible cyanosis.  No visible retractions or increased work of breathing.    NEURO: Cranial nerves grossly intact. Mentation and speech appropriate for age.  PSYCH: Mentation appears normal, affect normal/bright, judgement and insight intact, normal speech and appearance well-groomed     Video visit completed.  Transitional care management completed.  Recent hospitalization from 8/10/2020 through May 11, 2020 for concerns with choledocholithiasis.  Status post ERCP May 10, 2020 with removal of 3 small stones as well as biliary sphincterotomy procedure.  Immediate improvement in upper abdominal pain noted.  No jaundice.  No nausea.  Did have some mild hypoxia initially question secondary to splinting however this did improve before patient was discharged and states oxygen levels now at home around 98%.  Blood pressure was 146/83 with normal pulse.  Weight 237 pounds.  Did have prior colonoscopy September 30, 2015 with history of sigmoid diverticulosis.  Patient did have liver function test elevation noted during hospitalization.  AST increased to 287 and .  Total bilirubin 4.4 with direct bilirubin 2.7.  Patient has not had any concerns for jaundice or icteric sclera etc.  No cough or shortness of breath.  No fevers or chills.  No diarrhea.  No change in bowel movement appearance including pale stools etc.  No hematemesis.  No hematochezia or melena.    1. Choledocholithiasis  Transitional care  management completed.  Medication reconciliation as noted.  Choledocholithiasis requiring hospitalization May 10 through May 11, 2020 with ERCP on May 10, 2020 with removal of 3 small stones as well as completion of biliary sphincterotomy by Dr. angelo with [facility].  Patient is scheduled to see me in office on June 12 and will repeat LFTs at that time to ensure resolved elevation.    2. Coronary artery disease involving native coronary artery of native heart without angina pectoris  CAD history, stable.    3. Ischemic cardiomyopathy  History of ischemic cardiomyopathy with EF 51% on prior echocardiogram in 2018 as noted below.  Continues lisinopril 5 mg daily.  Has resumed aspirin 81 mg daily.    4. Essential hypertension with goal blood pressure less than 130/80  Taking metoprolol succinate 50 mg a.m. and 100 mg p.m. as well as lisinopril 5 mg daily.    5. ROMELIA on CPAP  CPAP for ROMELIA management.  No further concerns for hypoxia described.    6. Insomnia, unspecified type  Insomnia improved with use of trazodone 50 mg at bedtime.           EXAM: XR ERCP BILIARY ONLY   LOCATION: [facility]   DATE/TIME: 5/10/2020 12:40 PM     INDICATION: Intra and extrahepatic bile duct dilatation. Previous cholecystectomy.   COMPARISON: CT 05/10/2020.     TECHNIQUE: Exam performed by gastroenterologist.     FLUOROSCOPIC TIME: 1.1 minutes   NUMBER OF IMAGES: 5     FINDINGS:     BILE DUCTS: Intra/extrahepatic bile duct dilatation and cholecystectomy. Small intraluminal filling defects distal common bile duct compatible with choledocholithiasis. Sphincterotomy and balloon sweep performed.   PANCREATIC DUCT: Not imaged.    Impression:      1.  Choledocholithiasis with intra-/extrahepatic bile duct dilatation. Sphincterotomy and balloon sweep performed. Please see endoscopy report for further details the procedure.             EXAM: CT ABDOMEN PELVIS WO ORAL W IV CONTRAST   LOCATION: [facility]    DATE/TIME: 5/10/2020 5:42 AM     INDICATION: Left lower chest and left upper quadrant pain.   COMPARISON: None.   TECHNIQUE: CT scan of the abdomen and pelvis was performed following injection of IV contrast. Multiplanar reformats were obtained. Dose reduction techniques were used.   CONTRAST: Iohexol (Omni) 100 mL     FINDINGS:   LOWER CHEST: Small amount of bilateral lower lung atelectasis. Heart is enlarged. Cardiac electrodes right atrium, right ventricle and coronary sinus.     HEPATOBILIARY: Interval development of moderate intrahepatic biliary ductal dilatation. The common duct measures 15 mm. Possible tiny area of sludge or stone near the ampulla measuring 1.5 mm. Previous cholecystectomy.     PANCREAS: Normal.     SPLEEN: Normal in size.     ADRENAL GLANDS: No adrenal nodule.     KIDNEYS/BLADDER: No hydronephrosis. No hydroureter. Bladder partially filled.     BOWEL: Stomach filled with gastric contents. Minimal layered intraluminal hyperdensity in the duodenum near the ampulla. Appendix is partially air-filled and normal in caliber. Large amount of intraluminal fecal debris in the right colon. Mild   diverticulosis descending and sigmoid colon.     LYMPH NODES: No adenopathy.     VASCULATURE: Atherosclerosis.     PELVIC ORGANS: Prostate calcification. Minimal fat-containing ventral umbilical hernia.     MUSCULOSKELETAL: Thoracolumbar degenerative changes. Facet arthritis lower lumbar spine.    Impression:      1.  Interval development of moderate intrahepatic and extrahepatic biliary ductal dilatation. Possible tiny stone or sludge near the ampulla image 144 series 4 but difficult to evaluate on this study.   2.  Suggest correlation with lab values and follow-up MRCP may be helpful.   3.  Previous cholecystectomy.   4.  No CT evidence of appendicitis.   5.  Large amount of intraluminal fecal debris right colon.   6.  Diverticulosis.        Echo 8/23/18 Summary:    Left ventricle ejection fraction is  mildly decreased. The calculated left ventricular ejection fraction is 51%.    Normal left ventricular size.    Normal right ventricular size and systolic function.    Aortic Valve: The aortic valve is sclerotic without reduced excursion. Mild aortic regurgitation.    No hemodynamically significant valvular heart abnormalities.    When compared to the previous study dated 1/4/2016, no significant change.           Video-Visit Details    Type of service:  Video Visit    Video End Time (time video stopped): 3:35 PM  Originating Location (pt. Location): Home    Distant Location (provider location):  [facility]/OB     Platform used for Video Visit: Nichelle Garcia MD

## 2021-06-08 NOTE — PROGRESS NOTES
"Víctor Calderon is a 74 y.o. male who is being evaluated via a billable video visit.      The patient has been notified of following:     \"This video visit will be conducted via a call between you and your physician/provider. We have found that certain health care needs can be provided without the need for an in-person physical exam.  This service lets us provide the care you need with a video conversation.  If a prescription is necessary we can send it directly to your pharmacy.  If lab work is needed we can place an order for that and you can then stop by our lab to have the test done at a later time.    Video visits are billed at different rates depending on your insurance coverage. Please reach out to your insurance provider with any questions.    If during the course of the call the physician/provider feels a video visit is not appropriate, you will not be charged for this service.\"    Patient has given verbal consent to a Video visit? Yes    Will anyone else be joining your video visit? No        Video Start Time: 1:10 PM    Additional provider notes: GENERAL: Healthy, alert and no distress  EYES: Eyes grossly normal to inspection. No discharge or erythema, or obvious scleral/conjunctival abnormalities.  RESP: No audible wheeze, cough, or visible cyanosis.  No visible retractions or increased work of breathing.    NEURO: Cranial nerves grossly intact. Mentation and speech appropriate for age.  PSYCH: Mentation appears normal, affect normal/bright, judgement and insight intact, normal speech and appearance well-groomed    Video visit completed today.  In general doing well.  Blood pressure 139/85 at home today while on metoprolol succinate 50 mg a.m. and 100 mg p.m. along with lisinopril 5 mg daily.  Reviewed prior hospitalization May 10 through May 11 due to choledocholithiasis with noted LFT elevation with  and  prior to discharge with total bilirubin 4.4.  Has not had any concerns for jaundice " or icteric sclera etc. Patient has not had follow-up labs as of yet.  No jaundice.  No abdominal pain.  Normal appetite.  No diarrhea.  No change in bowel movement appearance including pale stools etc.  No hematemesis.  No hematochezia or melena. No palpitations.  No orthopnea or PND.  Requesting printed prescription for sildenafil 50 mg daily as needed.  Prior echocardiogram 2018 with ischemic cardiomyopathy with EF 51%.  No peripheral edema.  Comprehensive review of systems as above otherwise all negative.  Did have prior colonoscopy September 30, 2015 with history of sigmoid diverticulosis.   Patient   No cough or shortness of breath.  No fevers or chills.      1. Choledocholithiasis  History of choledocholithiasis with hospitalization May 10, 2020 as noted above.  No recurrent concerns.  We will continue to monitor.  Needs follow-up labs to ensure resolved LFT elevation.  Scheduled for next Tuesday, June 16, 2020.    2. Essential hypertension with goal blood pressure less than 130/80  Continues lisinopril 5 mg daily and metoprolol succinate 50 mg a.m. and 100 mg p.m.    3. Ischemic cardiomyopathy  Prior EF 51% on echocardiogram 2018.  Stable.  Asymptomatic.    4. Coronary artery disease involving native coronary artery of native heart without angina pectoris  No current use of nitrates etc.    5. Erectile dysfunction, unspecified erectile dysfunction type  Prescription printed for sildenafil as requested.  - sildenafiL (VIAGRA) 50 MG tablet; Take 1 tablet (50 mg total) by mouth as needed.  Dispense: 30 tablet; Refill: 11    6. Normochromic normocytic anemia  History normochromic normocytic anemia with prior hemoglobin 12.4 with MCV 97 on May 11, 2020 and will ensure stable or further improvement.  - HM2(CBC w/o Differential); Future    7. Abnormal LFTs  Schedule lab only visit next Tuesday June 16, 2024 comprehensive metabolic panel CBC rechecked to ensure resolved LFT elevation etc.  - Comprehensive Metabolic  Panel; Future           Video-Visit Details    Type of service:  Video Visit    Video End Time (time video stopped): 1:22 PM  Originating Location (pt. Location): Home    Distant Location (provider location):  [facility] MEDICINE/OB     Platform used for Video Visit: Nichelle Garcia MD

## 2021-06-08 NOTE — ANESTHESIA CARE TRANSFER NOTE
Last vitals:   Vitals:    05/10/20 1227   BP: 159/72   Pulse: 92   Resp: 18   Temp: 36.5  C (97.7  F)   SpO2: 98%     Patient's level of consciousness is alert and awake  Spontaneous respirations: yes  Maintains airway independently: yes  Dentition unchanged: yes  Oropharynx: oropharynx clear of all foreign objects    QCDR Measures:  ASA# 20 - Surgical Safety Checklist: WHO surgical safety checklist completed prior to induction    PQRS# 430 - Adult PONV Prevention: 4558F - Pt received => 2 anti-emetic agents (different classes) preop & intraop  ASA# 8 - Peds PONV Prevention: NA - Not pediatric patient, not GA or 2 or more risk factors NOT present  PQRS# 424 - An-op Temp Management: 4559F - At least one body temp DOCUMENTED => 35.5C or 95.9F within required timeframe  PQRS# 426 - PACU Transfer Protocol: - Transfer of care checklist used  ASA# 14 - Acute Post-op Pain: ASA14B - Patient did NOT experience pain >= 7 out of 10

## 2021-06-08 NOTE — ANESTHESIA POSTPROCEDURE EVALUATION
Patient: Víctor Calderon  Procedure(s):  ENDOSCOPIC RETROGRADE CHOLANGIOPANCREATOGRAPHY  Anesthesia type: general    Last vitals:   Vitals Value Taken Time   /61 5/10/2020  1:57 PM   Temp 36.3  C (97.3  F) 5/10/2020  1:57 PM   Pulse 92 5/10/2020  2:59 PM   Resp 20 5/10/2020  1:57 PM   SpO2 95 % 5/10/2020  1:57 PM   Vitals shown include unvalidated device data.  Post vital signs: stable  Level of consciousness: awake and responds to simple questions  Post-anesthesia pain: pain controlled  Post-anesthesia nausea and vomiting: no  Pulmonary: unassisted, return to baseline  Cardiovascular: stable and blood pressure at baseline  Hydration: adequate  Anesthetic events: no    QCDR Measures:  ASA# 11 - An-op Cardiac Arrest: ASA11B - Patient did NOT experience unanticipated cardiac arrest  ASA# 12 - An-op Mortality Rate: ASA12B - Patient did NOT die  ASA# 13 - PACU Re-Intubation Rate: ASA13B - Patient did NOT require a new airway mgmt  ASA# 10 - Composite Anes Safety: ASA10A - No serious adverse event    Additional Notes:

## 2021-06-10 NOTE — PROGRESS NOTES
Assessment:    1. Essential hypertension with goal blood pressure less than 130/80     2. Coronary artery disease involving native coronary artery of native heart without angina pectoris     3. Ischemic cardiomyopathy     4. Impaired fasting glucose           Plan:    Hypertension at goal blood pressure 134/70 on recheck.  Continue lisinopril 10 mg daily.  Remains on metoprolol extended release 150 mg total dose daily with history of CAD.  History of ischemic cardiomyopathy with improvement with most recent ejection fraction 50% with mild concentric LVH.  Patient will have follow-up echocardiogram next spring prior to annual follow-up with Dr. Sarmiento scheduled for April 2018.  Continue simvastatin 40 mg at bedtime.  Recent lipid cascade near goal.  Dietary and exercise modifications were discussed for weight goal less than 230 pounds initially, less than 220 pounds ideally.  Annual wellness visit in 6 months with fasting labs at that time.  Continue CPAP at 10 cm of water for ROMELIA management.        Subjective:    Víctor Calderon is seen today for follow-up evaluation.  Known history of hypertension.  Continues lisinopril 10 mg daily.  History of CAD with prior cardiac arrest status post dual-chamber pacemaker upgrade to biventricular ICD with prior EF 31% June 5, 2015.  Recent follow-up evaluation with Dr. Bond without further ICD adjustments required.  Had seen Dr. Sarmiento April 2017 and told to repeat echocardiogram prior to follow-up in 1 year.  Denies chest pain or shortness of breath.  No orthopnea or PND.  No recent illness.  Recently returned from [place] where he had visited for several weeks.  Comprehensive review of systems as above otherwise all negative.  Nonfasting today.  Continue simvastatin 40 mg at bedtime.  Does have history of impaired fasting glucose.      [facility] Heart Care Office Note 4/25/17 (Dr. Frost)   Assessment / Plan:  1. 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 Status post CRT-D device placement. Significant improvement in ventricular function to an ejection fraction of 50%, very mildly decreased. We will recheck an echocardiogram next year before I see him  2. Obstructive sleep apnea on CPAP.   3. Coronary artery disease. Moderate non obstructive lesions at the time of angiogram following his cardiac arrest. Continue aspirin 81 mg daily, along with simvastatin.  4. Sedentary lifestyle. He was congratulated on his improved activity and encouraged to continue his efforts.  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 left s/p T & A as child; dual chamber pacemaker placement on 2/9/15 after cardiac arrest.  Quit EtOH 02   ROMELIA on CPAP at 8 cm H2O  PHQ-9 score= (9/22/10)     Past Surgical History:   Procedure Laterality Date     CARDIAC CATHETERIZATION       COLONOSCOPY N/A 2015    Procedure: COLONOSCOPY;  Surgeon: Rafiq Gu MD;  Location: [facility];  Service:      HERNIA REPAIR      inguinal      INSERT / REPLACE / REMOVE PACEMAKER       polyp      removed during colonoscopy     [place] REMOVAL OF SPERM DUCT(S)      Description: Surgery Of Male Genitalia Vasectomy;  Recorded: 2008;     [place] REMOVE TONSILS/ADENOIDS,<11 Y/O      Description: Tonsillectomy With Adenoidectomy;  Recorded: 2008;     TUMOR REMOVAL      right vocal cord -         Family History   Problem Relation Age of Onset     Pneumonia Mother      Hypertension Mother      " "Cancer Father      COPD Sister      Multiple sclerosis Sister      Acute Myocardial Infarction Neg Hx         Past Medical History:   Diagnosis Date     Cholelithiasis      Coronary artery disease      Dermatitis      GERD (gastroesophageal reflux disease)      H/O cardiac arrest 2/2015    alka/torsades     Hyperlipidemia      Hypertension      Hypogonadism male      Left eye complaint     \"high pressure\"     Male erectile disorder      Obesity      Sleep apnea      Ventricular tachycardia     seen on pacer interrogation        Social History   Substance Use Topics     Smoking status: Former Smoker     Types: Cigarettes     Quit date: 6/1/2008     Smokeless tobacco: Never Used     Alcohol use No        Current Outpatient Prescriptions   Medication Sig Dispense Refill     aspirin 81 MG EC tablet Take 81 mg by mouth daily.       calcium carbonate (CALCIUM CARBONATE) 300 mg (750 mg) Chew Chew as needed.        coenzyme Q10 (CO Q-10) 200 mg capsule Take 200 mg by mouth 2 (two) times a day.       DIPHENHYDRAMINE HCL (BENADRYL ALLERGY ORAL) Take by mouth as needed.       lisinopril (ZESTRIL) 10 MG tablet Take 0.5 tablets (5 mg total) by mouth daily. Take 1/2 tablet every morning 45 tablet 0     metoprolol succinate (TOPROL XL) 100 MG 24 hr tablet Take 1 tablet at bedtime 90 tablet 0     metoprolol succinate (TOPROL-XL) 50 MG 24 hr tablet Take 1 tablet of 50 mg PLUS 1 tablet of 100 mg daily for total dose of 150 mg 90 tablet 1     NITROSTAT 0.4 mg SL tablet Place 0.4 mg under the tongue every 5 (five) minutes as needed.        omeprazole (PRILOSEC) 20 MG capsule Take 1 capsule (20 mg total) by mouth as needed. 90 capsule 3     psyllium with dextrose (METAMUCIL JAR) powder Take by mouth Daily after lunch.       sildenafil (VIAGRA) 50 MG tablet Take 1 tablet (50 mg total) by mouth daily as needed for erectile dysfunction. 60 tablet 1     simvastatin (ZOCOR) 40 MG tablet Take 1 tablet (40 mg total) by mouth bedtime. 90 " tablet 0     testosterone (ANDROGEL) 1 % (50 mg/5 gram) GlPk APPLY 1 TUBE (5 GM) TOPICALLY DAILY AS DIRECTED 150 packet 3     traMADol (ULTRAM) 50 mg tablet Take 1-2 tablets ( mg total) by mouth every 6 (six) hours as needed for pain. Take 1-2 tablets every 6 hours as needed for pain 40 tablet 0     No current facility-administered medications for this visit.           Objective:    Vitals:    05/17/17 1042 05/17/17 1129   BP: 140/78 134/70   Pulse: 60    Weight: (!) 238 lb (108 kg)       Body mass index is 33.91 kg/(m^2).    Alert.  No apparent distress.  Chest clear.  Cardiac exam regular.  Extremities warm and dry.  No peripheral edema.  Obesity noted.  Blood pressure 134/70 on recheck.       Transthoracic Echocardiography Report (TTE)     Demographics      Patient Name AVNI LEMUS Date of Study 01/04/2016      MRN [id number] Room Number      Account Number [id number]      Accession [id number]  Number      Date of Birth 1945 Referring Physician MONI HOWARD MD      Age 70 year(s) Sonographer [id number]      Gender Male Interpreting [facility] OUTPATIENT  Physician VICKI BOB MD      Procedure     Type of Study      TTE procedure:ECHO COMPLETE.      Procedure Date  Date: 01/04/2016 Start: 12:44 PM     Study Location: [facility]  Technical Quality: Limited visualization due to body habitus.     Patient Status: Routine     Contrast Medium: Definity. Used - 3 mland Wasted - 7 ml     Height: 71 inches Weight: 229.01 pounds BSA: 2.23 m^2 BMI: 31.94 kg/m^2     BP: 132/74 mmHg     Allergies  - No known allergies.     Indications  Cardiomyopathy.     Conclusions      Summary  Normal left ventricular size.  Mild concentric left ventricular hypertrophy.  Overall left ventricular systolic function is mildly depressed.  Left ventricular ejection fraction is visually estimated to be 50%.

## 2021-06-10 NOTE — PROGRESS NOTES
Noted.   Lynn Barnard RN BSN PHN  Device Nurse   [facility]        Device Report    Type: In clinic CRT-D check. Seen with Dr. Frost.  Presenting: AS BiVP 91bpm  Lead/Battery Status: Stable  Atrial Arrhythmias: None detected.  Vent Arrhythmias: 1 high ventricular rate detection from 3/15/2017 6:45pm, average rate 188bpm, 8 second VT (23bts) then it slows for 2 seconds and continues for 9bts. Pt does recall having a fast heart rate episode but isn't forsure that it is correlated to the date of the episode.   Comments: Dr. Frost made aware of episode. Normal device function. 100% BiVP, historically dependent with no R waves. Reprogrammed LV output from 1.7V to 1.8V. See Epic for documentation note routed to Dr. Bond. BK    Current ICD primary prevention settings: VT monitor only 170bpm and VF 200bpm  See Media tab for scanned in episode.     Lynn Barnard RN BSN PHN  Device Nurse   [facility]

## 2021-06-10 NOTE — PROGRESS NOTES
[facility] Heart Care Office Note    Assessment / Plan:    1.  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  Status post CRT-D device placement.  Significant improvement in ventricular function to an ejection fraction of 50%, very mildly decreased.  We will recheck an echocardiogram next year before I see him  2.  Obstructive sleep apnea on CPAP.   3.  Coronary artery disease.  Moderate non obstructive lesions at the time of angiogram following his cardiac arrest.  Continue aspirin 81 mg daily, along with simvastatin.  4.  Sedentary lifestyle.  He was congratulated on his improved activity and encouraged to continue his efforts.      Plan follow-up in 1 year, with echo prior to that visit    ______________________________________________________________________    Subjective:    I had the opportunity to see Víctor Calderon at the [facility]. Víctor Calderon is a 71 y.o. male with a history of bradycardia.  He had a cardiac arrest a year ago felt to be bradycardia-induced torsades.  He ultimately had to be her impairment of ventricular function and had CRT-D therapy.  Over the last year there is been gradual improvement in his ejection fraction.  An echocardiogram done last month showed an improvement in his ejection fraction up to 50%.  He has no history of significant coronary artery disease.    He is using CPAP regularly with good restoration.  His weight is actually down 12 pounds from when I saw him last year.  He is walking a mile daily with his wife.  He does note that he frequently forgets the second dose of metoprolol as he has divided the succinate preparation into twice daily.  He wonders if the episodes of nonsustained ventricular tachycardia noted on his ICD correlate with these episodes.  His wife notes that he is more jumpy if he has forgotten a dose of Toprol.    He has had no chest pain.  He otherwise feels well.  He offers no complaints.  He has been  "tolerating statin treatment, but takes co-Q10 daily cause he believes it prevents adverse effects.    ______________________________________________________________________    Problem List:  Patient Active Problem List   Diagnosis     Skin Neoplasm Of Uncertain Behavior     Hypercholesterolemia     Obstructive Sleep Apnea     Esophageal Reflux     Impaired Fasting Glucose     Changed Sexual Interest (Libido): Decreased     Cholelithiasis     Laryngeal Neoplasm Of The Vocal Cord     Hypertension     Dermatitis     Hoarseness     Male Erectile Disorder     Hypogonadism     Insomnia     Benign Tubular Adenoma Of The Large Intestine     Obesity     Skin Tag (___ Mm)     Tachycardia     Bradycardia     Cardiac arrest     Heart block AV complete     Right ventricular dysfunction     CAD (coronary artery disease)     Normochromic normocytic anemia     NSVT (nonsustained ventricular tachycardia)     Ischemic cardiomyopathy     Complete AV block, acquired     Adenomatous colon polyp     Tubular adenoma of colon     ROMELIA on CPAP     ICD (implantable cardioverter-defibrillator), biventricular, in situ     Medical History:  Past Medical History:   Diagnosis Date     Cholelithiasis      Coronary artery disease      Dermatitis      GERD (gastroesophageal reflux disease)      H/O cardiac arrest 2/2015    alka/torsades     Hyperlipidemia      Hypertension      Hypogonadism male      Left eye complaint     \"high pressure\"     Male erectile disorder      Obesity      Sleep apnea      Ventricular tachycardia     seen on pacer interrogation     Surgical History:  Past Surgical History:   Procedure Laterality Date     CARDIAC CATHETERIZATION       COLONOSCOPY N/A 9/30/2015    Procedure: COLONOSCOPY;  Surgeon: Rafiq Gu MD;  Location: [facility];  Service:      HERNIA REPAIR      inguinal      INSERT / REPLACE / REMOVE PACEMAKER       polyp  2010    removed during colonoscopy     [place] REMOVAL OF SPERM DUCT(S)      Description: " Surgery Of Male Genitalia Vasectomy;  Recorded: 02/22/2008;     MD REMOVE TONSILS/ADENOIDS,<11 Y/O      Description: Tonsillectomy With Adenoidectomy;  Recorded: 02/22/2008;     TUMOR REMOVAL  2008    right vocal cord -      Social History:  Social History     Social History     Marital status:      Spouse name: N/A     Number of children: N/A     Years of education: N/A     Occupational History     Not on file.     Social History Main Topics     Smoking status: Former Smoker     Types: Cigarettes     Quit date: 6/1/2008     Smokeless tobacco: Never Used     Alcohol use No     Drug use: No     Sexual activity: Not on file     Other Topics Concern     Not on file     Social History Narrative     Sleep History:  Uses CPAP nightly  Exercise History:  Walks with his wife around dusk for 1 mile at a brisk pace.  Usually breaks a sweat,    Review of Systems:   General: WNL  Eyes: WNL  Ears/Nose/Throat: WNL  Lungs: WNL  Heart: WNL  Stomach: WNL  Bladder: WNL  Muscle/Joints: WNL  Skin: WNL  Nervous System: WNL  Mental Health: WNL     Blood: WNL          Family History:  Family History   Problem Relation Age of Onset     Pneumonia Mother      Hypertension Mother      Cancer Father      COPD Sister      Multiple sclerosis Sister      Acute Myocardial Infarction Neg Hx          Allergies:  No Known Allergies  Medications:  Current Outpatient Prescriptions   Medication Sig Dispense Refill     aspirin 81 MG EC tablet Take 81 mg by mouth daily.       calcium carbonate (CALCIUM CARBONATE) 300 mg (750 mg) Chew Chew as needed.        coenzyme Q10 (CO Q-10) 200 mg capsule Take 200 mg by mouth 2 (two) times a day.       DIPHENHYDRAMINE HCL (BENADRYL ALLERGY ORAL) Take by mouth as needed.       lisinopril (ZESTRIL) 10 MG tablet Take 0.5 tablets (5 mg total) by mouth daily. Take 1/2 tablet every morning 45 tablet 0     metoprolol succinate (TOPROL XL) 100 MG 24 hr tablet Take 1 tablet at bedtime 90 tablet 0     metoprolol  "succinate (TOPROL-XL) 50 MG 24 hr tablet Take 1 tablet of 50 mg PLUS 1 tablet of 100 mg daily for total dose of 150 mg 90 tablet 1     NITROSTAT 0.4 mg SL tablet Place 0.4 mg under the tongue every 5 (five) minutes as needed.        omeprazole (PRILOSEC) 20 MG capsule Take 1 capsule (20 mg total) by mouth as needed. 90 capsule 3     psyllium with dextrose (METAMUCIL JAR) powder Take by mouth Daily after lunch.       sildenafil (VIAGRA) 50 MG tablet Take 1 tablet (50 mg total) by mouth daily as needed for erectile dysfunction. 60 tablet 1     simvastatin (ZOCOR) 40 MG tablet Take 1 tablet (40 mg total) by mouth bedtime. 90 tablet 0     testosterone (ANDROGEL) 1 % (50 mg/5 gram) GlPk APPLY 1 TUBE (5 GM) TOPICALLY DAILY AS DIRECTED 150 packet 3     traMADol (ULTRAM) 50 mg tablet Take 1-2 tablets ( mg total) by mouth every 6 (six) hours as needed for pain. Take 1-2 tablets every 6 hours as needed for pain 40 tablet 0     traZODone (DESYREL) 50 MG tablet Take 50 mg by mouth as needed.        No current facility-administered medications for this visit.        Objective:   Wt Readings from Last 3 Encounters:   04/25/17 (!) 235 lb (106.6 kg)   11/17/16 (!) 241 lb (109.3 kg)   05/17/16 (!) 234 lb (106.1 kg)     Vital signs:  /88 (Patient Site: Right Arm, Patient Position: Sitting, Cuff Size: Adult Large)  Pulse 84  Resp 16  Ht 5' 10.25\" (1.784 m)  Wt (!) 235 lb (106.6 kg)  BMI 33.48 kg/m2      Physical Exam:    GENERAL APPEARANCE: Sleepy, cooperative and in no acute distress.  HEENT: Conjunctivae  slightly injected.  No scleral icterus. No Xanthelasma. Oral mucous membranes pink and moist.  NECK: No JVD.  No Hepatojugular reflux. Thyroid not enlarged.  CHEST: clear to auscultation  CARDIOVASCULAR: regular S1, S2 without murmur ,clicks or rubs.  Nonpalpable point of maximal impulse.. Brachial, radial and posterior tibial pulses are intact and symetric. No carotid bruits noted.  ABDOMEN obese, soft. Nontender. " BS+. No bruits.  EXTREMITIES: No cyanosis, clubbing or edema.    Lab Results:  LIPIDS:  Lab Results   Component Value Date    CHOL 162 11/17/2016    CHOL 130 05/17/2016    CHOL 153 11/17/2015     Lab Results   Component Value Date    HDL 50 11/17/2016    HDL 41 05/17/2016    HDL 39 (L) 11/17/2015     Lab Results   Component Value Date    LDLCALC 81 11/17/2016    LDLCALC 66 05/17/2016    LDLCALC 89 11/17/2015     Lab Results   Component Value Date    TRIG 155 (H) 11/17/2016    TRIG 113 05/17/2016    TRIG 125 11/17/2015       Echocardiogram 1/2016:  Summary   Normal left ventricular size.   Mild concentric left ventricular hypertrophy.   Overall left ventricular systolic function is mildly depressed.   Left ventricular ejection fraction is visually estimated to be 50%.               SAYRA PRATHER MD On license of [facility]  [phone]    This note created using Dragon voice recognition software.  Sound alike errors may have escaped editing.

## 2021-06-10 NOTE — PROGRESS NOTES
Irregular nonsustained ventricular tachycardia noted in monitor zone with spontaneous termination.  Asymptomatic.  No changes in ICD program recommended.  Tarun Bond

## 2021-06-12 NOTE — TELEPHONE ENCOUNTER
Refill Approved    Rx renewed per Medication Renewal Policy. Medication was last renewed on 5/19/2019.    Bethany Mahajan, [facility] Connection Triage/Med Refill 10/4/2020     Requested Prescriptions   Pending Prescriptions Disp Refills     omeprazole (PRILOSEC) 20 MG capsule [Pharmacy Med Name: OMEPRAZOLE DR CAPS 20MG] 90 capsule 3     Sig: TAKE 1 CAPSULE AS NEEDED       GI Medications Refill Protocol Passed - 9/30/2020  7:08 PM        Passed - PCP or prescribing provider visit in last 12 or next 3 months.     Last office visit with prescriber/PCP: 11/8/2019 Adams Garcia MD OR same dept: 11/8/2019 Adams Garcia MD OR same specialty: 2/7/2020 Nury Shah MD  Last physical: 12/12/2019 Last MTM visit: Visit date not found   Next visit within 3 mo: Visit date not found  Next physical within 3 mo: Visit date not found  Prescriber OR PCP: Adams Garcia MD  Last diagnosis associated with med order: 1. GERD (gastroesophageal reflux disease)  - omeprazole (PRILOSEC) 20 MG capsule [Pharmacy Med Name: OMEPRAZOLE DR CAPS 20MG]; TAKE 1 CAPSULE AS NEEDED  Dispense: 90 capsule; Refill: 3    If protocol passes may refill for 12 months if within 3 months of last provider visit (or a total of 15 months).

## 2021-06-13 NOTE — PROGRESS NOTES
Assessment and Plan:     Patient has been advised of split billing requirements and indicates understanding: No     1. Encounter for general adult medical examination with abnormal findings  -Annual wellness visit reviewed with risks associate with suboptimal exercise and diet.  Hearing loss issues however that has declined audiology referral.  Needs to complete healthcare directives and paperwork was provided today.  Annual wellness visits to continue.     2. Impaired fasting glucose  Labs including A1c obtained today with recommendation for therapeutic lifestyle changes for weight goal less than 230 pounds initially, less than 220 pounds ideally with noted 21 pound weight gain unfortunately since prior physical December 12, 2019.  - Glycosylated Hemoglobin A1c    3. Hyperlipidemia LDL goal <100  Repeat lipid cascade.  Continue simvastatin 40 mg at bedtime.  - Lipid Profile    4. Benign essential hypertension  Blood pressure rechecks improved (160/80 => 128/70).   - Comprehensive Metabolic Panel  - Continues lisinopril 5 mg daily and metoprolol succinate 50 mg a.m. and 100 mg p.m.    5. Choledocholithiasis, S/P ERCP and Lap Cholecystectomy  History of choledocholithiasis with hospitalization May 10, 2020.  No recurrent concerns.  We will continue to monitor.  Does have history of LFT elevation.   - Will recheck CMP.      6. Ischemic cardiomyopathy  Prior EF 51% on echocardiogram 2018.  Stable.  Asymptomatic.     7. Coronary artery disease involving native coronary artery of native heart without angina pectoris  No current use of nitrates, etc.     8. Erectile dysfunction, unspecified erectile dysfunction type  On sildenafiL (VIAGRA) 50 MG tablet PRN     9. Normochromic normocytic anemia  History normochromic normocytic anemia with prior hemoglobin 15.8 with MCV 95 on June 19, 2020. Asymptomatic  - HM2    10. Left Lower Abdominal Pain  Given the duration and nature of symptoms, I do not suspect this to be  diverticulitis. No black or tarry stool. Exam did not reveal any evidence for inguinal hernia. I do suspect that symptoms are from constipation. Patient will continue home stool softener agents.   - The patient is due for a colonoscopy and agreed to have a repeat.     12. Colon Cancer Screening  History of adenomatous polyp on past colonoscopy.   - Ambulatory referral for colonoscopy     11. Hoarse Voice  I did recommend increasing his dose of omeprazole 20 mg every day to 20 mg two times a day. The patient uses CPAP for ROMELIA. I did recommend increasing humidity to see if this would improve symptoms. If persistent or worsening, would recommend follow-up with his ENT provider Dr. Stroud.     12. ROMLEIA  - On CPAP    13. Weight  Patient has gained 21 lbs since prior annual visit in 2019. Healthy diet and exercise were encouraged.  - Initial Goal of 230 lbs, final goal of <220 lbs    Patient seen and examined with REBECCA Fry student.  Agree with assessment and plan with physical exam completed as noted.    The patient's current medical problems were reviewed.    I have had an Advance Directives discussion with the patient.     The following health maintenance schedule was reviewed with the patient and provided in printed form in the after visit summary:   Health Maintenance   Topic Date Due     HEPATITIS B VACCINES (1 of 3 - Risk 3-dose series) 07/02/1964     ZOSTER VACCINES (2 of 3) 10/28/2008     COLORECTAL CANCER SCREENING  09/30/2020     MEDICARE ANNUAL WELLNESS VISIT  12/15/2021     FALL RISK ASSESSMENT  12/15/2021     LIPID  12/12/2024     ADVANCE CARE PLANNING  12/15/2025     TD 18+ HE  06/12/2029     HEPATITIS C SCREENING  Completed     Pneumococcal Vaccine: 65+ Years  Completed     INFLUENZA VACCINE RULE BASED  Completed     Pneumococcal Vaccine: Pediatrics (0 to 5 Years) and At-Risk Patients (6 to 64 Years)  Aged Out        Subjective:     Chief Complaint: Víctor Calderon is an 75 y.o. male here for an  "Annual Wellness visit.     HPI: The patient states that he's doing well overall. He does report intermittent, minimal (rated 1 to 3 out of 10) dull left lower quadrant abdominal pain which occurs in the AM when he first wakes up from sleep, resolves with standing up, and started approximately 6 months ago. He denies fevers, chills, bloody or black stool, change in bowel habits, nausea, vomiting, or other associated symptoms. He also does state that his voice seems more hoarse than usual; of note. 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 Stroud at [facility].  Chronic medications stable.  Does not require refill at this time.  Prior history of cholecystitis with choledocholithiasis with LFT elevation noted.  History of CAD with cardiomyopathy with prior EF 51%.  Status post CRT-D device placement.  Continue CPAP at 10 cwp for ROMELIA management.    Review of Systems:  Please see above.  The rest of the review of systems are negative for all systems.     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 left s/p T & A as child; dual chamber pacemaker placement on 2/9/15 after cardiac arrest.  Quit EtOH 02   ROMLEIA on CPAP at 8 cm H2O  PHQ-9 score= (9/22/10)     Patient Care Team:  Adams Garcia MD as PCP - General  Adams Garcia MD as Assigned PCP  Shin Baird MD as Assigned Surgical Provider  Rafal Frost MD as Assigned Heart and Vascular Provider     Patient Active Problem List   Diagnosis     Skin Neoplasm " Of Uncertain Behavior     Hypercholesterolemia     Obstructive Sleep Apnea     Esophageal Reflux     Impaired Fasting Glucose     Changed Sexual Interest (Libido): Decreased     Cholelithiasis     Laryngeal Neoplasm Of The Vocal Cord     HTN (hypertension)     Dermatitis     Hoarseness     Male Erectile Disorder     Hypogonadism     Insomnia     Benign Tubular Adenoma Of The Large Intestine     Class 1 obesity due to excess calories with serious comorbidity and body mass index (BMI) of 31.0 to 31.9 in adult     Skin Tag (___ Mm)     Tachycardia     Bradycardia     Cardiac arrest (H)     Heart block AV complete (H)     Right ventricular dysfunction     CAD (coronary artery disease)     Normochromic normocytic anemia     NSVT (nonsustained ventricular tachycardia) (H)     Ischemic cardiomyopathy     Complete AV block, acquired (H)     Adenomatous colon polyp     Tubular adenoma of colon     ROMELIA on CPAP     ICD (implantable cardioverter-defibrillator), biventricular, in situ     Bilateral hearing loss     Erectile dysfunction, unspecified erectile dysfunction type     Cholelithiasis     Paroxysmal atrial fibrillation (H)     Epigastric pain     Symptomatic cholelithiasis     Choledocholithiasis     Hypoxia     Obesity (BMI 35.0-39.9) with comorbidity (H)     Past Medical History:   Diagnosis Date     Cholelithiasis      Coronary artery disease      Dermatitis      GERD (gastroesophageal reflux disease)      H/O cardiac arrest 2/2015    alka/torsades     Hyperlipidemia      Hypertension      Hypogonadism male      Male erectile disorder      Obesity      Sleep apnea     CPAP     Ventricular tachycardia (H)     seen on pacer interrogation      Past Surgical History:   Procedure Laterality Date     CARDIAC CATHETERIZATION       COLONOSCOPY N/A 9/30/2015    Procedure: COLONOSCOPY;  Surgeon: Rafiq Gu MD;  Location: [facility];  Service:      HERNIA REPAIR      inguinal      INSERT / REPLACE / REMOVE PACEMAKER        LAPAROSCOPIC CHOLECYSTECTOMY N/A 10/2/2019    Procedure: CHOLECYSTECTOMY, LAPAROSCOPIC;  Surgeon: Shin Baird MD;  Location: [facility];  Service: General     polyp  2010    removed during colonoscopy     MI ERCP DX COLLECTION SPECIMEN BRUSHING/WASHING N/A 5/10/2020    Procedure: ENDOSCOPIC RETROGRADE CHOLANGIOPANCREATOGRAPHY;  Surgeon: Simon Loaiza MD;  Location: [facility];  Service: Gastroenterology     MI REMOVAL OF SPERM DUCT(S)      Description: Surgery Of Male Genitalia Vasectomy;  Recorded: 2008;     MI REMOVE TONSILS/ADENOIDS,<13 Y/O      Description: Tonsillectomy With Adenoidectomy;  Recorded: 2008;     TONSILLECTOMY       TUMOR REMOVAL      right vocal cord -      XR ERCP BILIARY ONLY  5/10/2020      Family History   Problem Relation Age of Onset     Pneumonia Mother      Hypertension Mother      Cancer Father      COPD Sister      Multiple sclerosis Sister      Acute Myocardial Infarction Neg Hx       Social History     Socioeconomic History     Marital status:      Spouse name: Not on file     Number of children: Not on file     Years of education: Not on file     Highest education level: Not on file   Occupational History     Not on file   Social Needs     Financial resource strain: Not on file     Food insecurity     Worry: Not on file     Inability: Not on file     Transportation needs     Medical: Not on file     Non-medical: Not on file   Tobacco Use     Smoking status: Former Smoker     Packs/day: 0.00     Types: Cigarettes     Quit date: 2008     Years since quittin.5     Smokeless tobacco: Never Used   Substance and Sexual Activity     Alcohol use: No     Drug use: No     Sexual activity: Not on file   Lifestyle     Physical activity     Days per week: Not on file     Minutes per session: Not on file     Stress: Not on file   Relationships     Social connections     Talks on phone: Not on file     Gets together: Not on file      Attends Taoism service: Not on file     Active member of club or organization: Not on file     Attends meetings of clubs or organizations: Not on file     Relationship status: Not on file     Intimate partner violence     Fear of current or ex partner: Not on file     Emotionally abused: Not on file     Physically abused: Not on file     Forced sexual activity: Not on file   Other Topics Concern     Not on file   Social History Narrative     about 8 years ago after 44 years marriage    Now engaged to his fiance whom he found on [email or web address].     Has his own machine shop.         Nury Haydenha 2/7/2020      Current Outpatient Medications   Medication Sig Dispense Refill     aspirin 81 MG EC tablet Take 81 mg by mouth daily.       calcium carbonate (CALCIUM CARBONATE) 300 mg (750 mg) Chew Chew 2-3 tablets as needed (heartburn).              coenzyme Q10 (CO Q-10) 200 mg capsule Take 200 mg by mouth daily.        DIPHENHYDRAMINE HCL (BENADRYL ALLERGY ORAL) Take 25-50 mg by mouth every 6 (six) hours as needed (allergies).              fish oil-omega-3 fatty acids (FISH OIL) 300-1,000 mg capsule Take 1 g by mouth daily.       fluticasone propionate (FLONASE) 50 mcg/actuation nasal spray USE 2 SPRAYS IN EACH NOSTRIL DAILY 48 g 4     latanoprost (XALATAN) 0.005 % ophthalmic solution Administer 1 drop to both eyes at bedtime.              lisinopril (PRINIVIL,ZESTRIL) 5 MG tablet Take 1 tablet (5 mg total) by mouth daily. 90 tablet 3     metoprolol succinate (TOPROL-XL) 100 MG 24 hr tablet TAKE 1 TABLET AT BEDTIME 90 tablet 3     metoprolol succinate (TOPROL-XL) 50 MG 24 hr tablet Take 50 mg by mouth every morning. Takes a different dose at bedtime       multivitamin therapeutic tablet Take 1 tablet by mouth daily.       nitroglycerin (NITROSTAT) 0.4 MG SL tablet Place 1 tablet (0.4 mg total) under the tongue every 5 (five) minutes as needed for chest pain. 100 tablet 3     omeprazole (PRILOSEC) 20 MG capsule TAKE 1  "CAPSULE AS NEEDED 90 capsule 2     oxymetazoline (AFRIN) 0.05 % nasal spray Apply 2 sprays into each nostril daily as needed for congestion.       psyllium (METAMUCIL) 3.4 gram packet Take 1 packet by mouth 2 (two) times a day.       sildenafiL (VIAGRA) 50 MG tablet Take 1 tablet (50 mg total) by mouth as needed. 30 tablet 11     simvastatin (ZOCOR) 40 MG tablet TAKE 1 TABLET AT BEDTIME 90 tablet 3     tadalafiL (CIALIS) 10 MG tablet Take 1 tablet (10 mg total) by mouth daily as needed for erectile dysfunction. 30 tablet 11     traMADol (ULTRAM) 50 mg tablet Take 1-2 tablets ( mg total) by mouth every 6 (six) hours as needed for pain. Take 1-2 tablets every 6 hours as needed for pain 15 tablet 1     traZODone (DESYREL) 50 MG tablet TAKE 1 TABLET AT BEDTIME 90 tablet 3     No current facility-administered medications for this visit.       Objective:   Vital Signs:   Visit Vitals  /85   Pulse 85   Temp 97  F (36.1  C)   Ht 5' 10.5\" (1.791 m)   Wt (!) 249 lb (112.9 kg)   SpO2 96%   BMI 35.22 kg/m           VisionScreening:  No exam data present     PHYSICAL EXAM    General Appearance:    Alert, cooperative, no distress, appears stated age.     Head:    Normocephalic, without obvious abnormality, atraumatic   Eyes:    PERRL, conjunctiva/corneas clear, EOM's intact, fundi     benign, both eyes        Ears:    Normal TM's and external ear canals, both ears   Nose:   Nares normal, septum midline, mucosa normal, no drainage    or sinus tenderness   Throat:   Lips, mucosa, and tongue normal; teeth and gums normal   Neck:   Supple, symmetrical, trachea midline, no adenopathy;        thyroid:  No enlargement/tenderness/nodules; no carotid    bruit or JVD   Back:     Symmetric, no curvature, ROM normal, no CVA tenderness   Lungs:     Clear to auscultation bilaterally, respirations unlabored   Chest wall:    No tenderness or deformity   Heart:    Regular rate and rhythm, S1 and S2 normal, no murmur, rub   or gallop "   Abdomen:     Soft, non-tender, bowel sounds active all four quadrants,     no masses, no organomegaly.     Genitalia:    Normal male without lesion, discharge or tenderness.  No inguinal hernia noted.     Rectal:    Normal tone.  Prostate slightly enlarged/symmetric, no masses or tenderness.   Extremities:   Extremities normal, atraumatic, no cyanosis or edema   Pulses:   2+ and symmetric all extremities   Skin:   Skin color, texture, turgor normal, no rashes or lesions   Lymph nodes:   Cervical, supraclavicular, and axillary nodes normal   Neurologic:   CNII-XII intact. Normal strength, sensation and reflexes       throughout       Assessment Results 12/15/2020   Activities of Daily Living No help needed   Instrumental Activities of Daily Living No help needed   Get Up and Go Score Less than 12 seconds   Mini Cog Total Score 5   Some recent data might be hidden     A Mini-Cog score of 0-2 suggests the possibility of dementia, score of 3-5 suggests no dementia    Identified Health Risks:     He is at risk for lack of exercise and has been provided with information to increase physical activity for the benefit of his well-being.  The patient was counseled and encouraged to consider modifying their diet and eating habits. He was provided with information on recommended healthy diet options.  The patient was provided with written information regarding signs of hearing loss.  Information regarding advance directives (living zafar), including where he can download the appropriate form, was provided to the patient via the AVS.

## 2021-06-14 NOTE — PROGRESS NOTES
Assessment and Plan:       1. Encounter for general adult medical examination with abnormal findings  Annual wellness visit completed.  High-dose flu shot given otherwise immunizations reviewed and up-to-date.  Risks associated with lack of advance directives.  Patient will complete.  Also bilateral hearing loss, chronic.  Patient declines further evaluation at this time.  Needs to improve diet as well for dietary and exercise modifications in order to achieve weight goal less than 220 pounds ideally.    2. Class 1 obesity due to excess calories with serious comorbidity and body mass index (BMI) of 33.0 to 33.9 in adult  Dietary and exercise modifications for weight goal less than 230 pounds initially, less than 220 pounds ideally with reassessment of follow-up no later than 6 months.    3. Need for vaccination  High-dose flu shot given.  Prior tetanus status up-to-date 2009.  Remainder of immunizations up-to-date including Prevnar Pneumovax immunizations.  - Influenza High Dose, Seasonal 65+ yrs    4. Coronary artery disease involving native coronary artery of native heart without angina pectoris  Patient will continue to follow with [facility] cardiology with appointment after the first of the year anticipated.  Remains asymptomatic currently.    5. Essential hypertension with goal blood pressure less than 130/80  Hypertension at goal with blood pressure 124/72 in office today and blood pressures at home typically less than 130 over mid 70s.  - Comprehensive Metabolic Panel    6. Ischemic cardiomyopathy  Most recent ejection fraction 50% from echocardiogram 1/4/16 as noted below.  Continue lisinopril 10 mg using half tablet daily as well as metoprolol succinate 150 mg daily.  Defibrillator in place.  - HM2(CBC w/o Differential)    7. Impaired fasting glucose  Check fasting glucose and A1c  - Comprehensive Metabolic Panel  - Glycosylated Hemoglobin A1c    8. Hypercholesterolemia  Repeat lipid cascade today while  on simvastatin 40 mg at bedtime.  LFT monitoring completed.  - Comprehensive Metabolic Panel  - Lipid Cascade    9. Tubular adenoma of colon  Prior colonoscopy September 15, 2009.  H/O tubular adenoma.  Referral placed for repeat colonoscopy    10. ROMELIA on CPAP  As above repeat colonoscopy at 5 year interval with history of tubular adenoma with prior colonoscopy noted September 15, 2009.  Will have [place] [facility] confirm most recent colonoscopy date and follow-up frequency.    11. Erectile dysfunction, unspecified erectile dysfunction type  Viagra available on as-needed basis with benefits noted.  Declines further evaluation regarding history of hypogonadism and no longer using testosterone replacement.    12. Bilateral hearing loss  Declines audiology referral at this time.  We will continue to monitor.    13. Skin tag  Skin tag ×2 removed typical fashion without concerns.         The patient's current medical problems were reviewed.    I have had an Advance Directives discussion with the patient.  The following high BMI interventions were performed this visit: encouragement to exercise, weight monitoring, weight loss from baseline weight and lifestyle education regarding diet.  Weight goal < 230 pounds initially, < 220 pounds ideally.     The following health maintenance schedule was reviewed with the patient and provided in printed form in the after visit summary:   Health Maintenance   Topic Date Due     FALL RISK ASSESSMENT  05/17/2018     TD 18+ HE  09/03/2019     COLONOSCOPY  09/30/2020     ADVANCE DIRECTIVES DISCUSSED WITH PATIENT  11/17/2021     PNEUMOCOCCAL POLYSACCHARIDE VACCINE AGE 65 AND OVER  Completed     INFLUENZA VACCINE RULE BASED  Completed     PNEUMOCOCCAL CONJUGATE VACCINE FOR ADULTS (PCV13 OR PREVNAR)  Completed     ZOSTER VACCINE  Completed        Subjective:     Chief Complaint: Víctor Calderon is an 72 y.o. male here for an Annual Wellness visit.     HPI: Patient seen today for  "annual wellness visit.  In general doing well.  Has not lost any weight however.  History of CAD status post cardiac arrest with ventricular tachycardia history.  Defibrillator in place.  Follows with cardiologist and is anticipating follow-up after the first of the year.  History of ischemic cardiomyopathy with prior ejection fraction 33% May 2015 with most recent echocardiogram showing improvement 50% 2016.  Continues lisinopril 10 mg using half tablet daily as well as metoprolol succinate 150 mg daily.  Blood pressures at home typically in the 120s over mid 70s.  Continues simvastatin 40 mg at bedtime for cholesterol management.  History of tubular adenoma on prior colonoscopy.  Continue CPAP for ROMELIA management.  Impaired fasting glucose history.  Viagra as needed for male erectile dysfunction and no longer using testosterone replacement.  Comprehensive review of systems as above otherwise all negative.     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 left s/p T & A as child; dual chamber pacemaker placement on 2/9/15 after cardiac arrest.  Quit EtOH 02   ROMELIA on CPAP at 8 cm H2O  PHQ-9 score= (9/22/10)     Review of Systems:  Please see above.  The rest of the review of systems are negative for all systems.    Patient Care Team:  Adams Garcia MD as PCP - General     Patient Active Problem List   Diagnosis     Skin Neoplasm Of Uncertain Behavior     Hypercholesterolemia     Obstructive Sleep Apnea     Esophageal Reflux     Impaired Fasting Glucose     Changed Sexual " "Interest (Libido): Decreased     Cholelithiasis     Laryngeal Neoplasm Of The Vocal Cord     Hypertension     Dermatitis     Hoarseness     Male Erectile Disorder     Hypogonadism     Insomnia     Benign Tubular Adenoma Of The Large Intestine     Obesity     Skin Tag (___ Mm)     Tachycardia     Bradycardia     Cardiac arrest     Heart block AV complete     Right ventricular dysfunction     CAD (coronary artery disease)     Normochromic normocytic anemia     NSVT (nonsustained ventricular tachycardia)     Ischemic cardiomyopathy     Complete AV block, acquired     Adenomatous colon polyp     Tubular adenoma of colon     ROMELIA on CPAP     ICD (implantable cardioverter-defibrillator), biventricular, in situ     Bilateral hearing loss     Erectile dysfunction, unspecified erectile dysfunction type     Past Medical History:   Diagnosis Date     Cholelithiasis      Coronary artery disease      Dermatitis      GERD (gastroesophageal reflux disease)      H/O cardiac arrest 2/2015    alka/torsades     Hyperlipidemia      Hypertension      Hypogonadism male      Left eye complaint     \"high pressure\"     Male erectile disorder      Obesity      Sleep apnea      Ventricular tachycardia     seen on pacer interrogation      Past Surgical History:   Procedure Laterality Date     CARDIAC CATHETERIZATION       COLONOSCOPY N/A 9/30/2015    Procedure: COLONOSCOPY;  Surgeon: Rafiq Gu MD;  Location: [facility];  Service:      HERNIA REPAIR      inguinal      INSERT / REPLACE / REMOVE PACEMAKER       polyp  2010    removed during colonoscopy     NV REMOVAL OF SPERM DUCT(S)      Description: Surgery Of Male Genitalia Vasectomy;  Recorded: 02/22/2008;     NV REMOVE TONSILS/ADENOIDS,<11 Y/O      Description: Tonsillectomy With Adenoidectomy;  Recorded: 02/22/2008;     TUMOR REMOVAL  2008    right vocal cord -       Family History   Problem Relation Age of Onset     Pneumonia Mother      Hypertension Mother      Cancer Father  "     COPD Sister      Multiple sclerosis Sister      Acute Myocardial Infarction Neg Hx       Social History     Social History     Marital status:      Spouse name: N/A     Number of children: N/A     Years of education: N/A     Occupational History     Not on file.     Social History Main Topics     Smoking status: Former Smoker     Types: Cigarettes     Quit date: 6/1/2008     Smokeless tobacco: Never Used     Alcohol use No     Drug use: No     Sexual activity: Not on file     Other Topics Concern     Not on file     Social History Narrative      Current Outpatient Prescriptions   Medication Sig Dispense Refill     aspirin 81 MG EC tablet Take 81 mg by mouth daily.       calcium carbonate (CALCIUM CARBONATE) 300 mg (750 mg) Chew Chew as needed.        coenzyme Q10 (CO Q-10) 200 mg capsule Take 200 mg by mouth 2 (two) times a day.       DIPHENHYDRAMINE HCL (BENADRYL ALLERGY ORAL) Take by mouth as needed.       lisinopril (PRINIVIL,ZESTRIL) 10 MG tablet TAKE ONE-HALF (1/2) TABLET DAILY EVERY MORNING 45 tablet 2     metoprolol succinate (TOPROL-XL) 100 MG 24 hr tablet TAKE 1 TABLET AT BEDTIME 90 tablet 1     metoprolol succinate (TOPROL-XL) 50 MG 24 hr tablet Take 1 tablet of 50 mg PLUS 1 tablet of 100 mg daily for total dose of 150 mg 90 tablet 1     omeprazole (PRILOSEC) 20 MG capsule Take 1 capsule (20 mg total) by mouth as needed. 90 capsule 3     psyllium with dextrose (METAMUCIL JAR) powder Take by mouth Daily after lunch.       sildenafil (VIAGRA) 50 MG tablet Take 1 tablet (50 mg total) by mouth daily as needed for erectile dysfunction. 60 tablet 1     simvastatin (ZOCOR) 40 MG tablet TAKE 1 TABLET AT BEDTIME 90 tablet 1     NITROSTAT 0.4 mg SL tablet Place 0.4 mg under the tongue every 5 (five) minutes as needed.        testosterone (ANDROGEL) 1 % (50 mg/5 gram) GlPk APPLY 1 TUBE (5 GM) TOPICALLY DAILY AS DIRECTED 150 packet 3     traMADol (ULTRAM) 50 mg tablet Take 1-2 tablets ( mg total) by  "mouth every 6 (six) hours as needed for pain. Take 1-2 tablets every 6 hours as needed for pain 40 tablet 0     No current facility-administered medications for this visit.       Objective:   Vital Signs:   Visit Vitals     /72     Pulse 64     Ht 5' 10.5\" (1.791 m)     Wt (!) 239 lb (108.4 kg)     BMI 33.81 kg/m2        VisionScreening:  No exam data present     PHYSICAL EXAM    General Appearance:    Alert, cooperative, no distress, appears stated age.  Obesity.   Head:    Normocephalic, without obvious abnormality, atraumatic   Eyes:    PERRL, conjunctiva/corneas clear, EOM's intact, fundi     benign, both eyes.  Glasses.        Ears:    Normal TM's and external ear canals, both ears   Nose:   Nares normal, septum midline, mucosa normal, no drainage    or sinus tenderness   Throat:   Lips, mucosa, and tongue normal; teeth and gums normal   Neck:   Supple, symmetrical, trachea midline, no adenopathy;        thyroid:  No enlargement/tenderness/nodules; no carotid    bruit or JVD   Back:     Symmetric, no curvature, ROM normal, no CVA tenderness   Lungs:     Clear to auscultation bilaterally, respirations unlabored   Chest wall:    No tenderness or deformity   Heart:    Regular rate and rhythm, S1 and S2 normal, no murmur, rub   or gallop   Abdomen:     Soft, non-tender, bowel sounds active all four quadrants,     no masses, no organomegaly.     Genitalia:    Normal male without lesion, discharge or tenderness.  No inguinal hernia noted.     Rectal:    Normal tone.  Prostate normal/symmetric, no masses or tenderness.   Extremities:   Extremities normal, atraumatic, no cyanosis or edema   Pulses:   2+ and symmetric all extremities   Skin:   Skin color, texture, turgor normal, no rashes or lesions.  Skin tag x 2 removed in typical fashion (right lateral neck and left axilla).    Lymph nodes:   Cervical, supraclavicular, and axillary nodes normal   Neurologic:   CNII-XII intact. Normal strength, sensation and " reflexes       throughout        Assessment Results 11/15/2017   Activities of Daily Living No help needed   Instrumental Activities of Daily Living No help needed   Get Up and Go Score Less than 12 seconds   Mini Cog Total Score 4   Some recent data might be hidden     A Mini-Cog score of 0-2 suggests the possibility of dementia, score of 3-5 suggests no dementia    Identified Health Risks:     He is at risk for lack of exercise and has been provided with information to increase physical activity for the benefit of his well-being.  The patient was counseled and encouraged to consider modifying their diet and eating habits. He was provided with information on recommended healthy diet options.  The patient was provided with written information regarding signs of hearing loss.  Information regarding advance directives (living zafar), including where he can download the appropriate form, was provided to the patient via the AVS.           TTE procedure:ECHO COMPLETE  Procedure Date  Date: 01/04/2016 Start: 12:44 PM     Study Location: [facility]  Technical Quality: Limited visualization due to body habitus.     Patient Status: Routine     Contrast Medium: Definity. Used - 3 mland Wasted - 7 ml     Height: 71 inches Weight: 229.01 pounds BSA: 2.23 m^2 BMI: 31.94 kg/m^2     BP: 132/74 mmHg     Allergies    - No known allergies.     Indications  Cardiomyopathy.      Conclusions       Summary   Normal left ventricular size.   Mild concentric left ventricular hypertrophy.   Overall left ventricular systolic function is mildly depressed.   Left ventricular ejection fraction is visually estimated to be 50%.

## 2021-06-15 ENCOUNTER — OFFICE VISIT - HEALTHEAST (OUTPATIENT)
Dept: FAMILY MEDICINE | Facility: CLINIC | Age: 76
End: 2021-06-15

## 2021-06-15 DIAGNOSIS — I10 BENIGN ESSENTIAL HYPERTENSION: ICD-10-CM

## 2021-06-15 DIAGNOSIS — I25.5 ISCHEMIC CARDIOMYOPATHY: ICD-10-CM

## 2021-06-15 DIAGNOSIS — D12.6 BENIGN NEOPLASM OF COLON, UNSPECIFIED PART OF COLON: ICD-10-CM

## 2021-06-15 DIAGNOSIS — E78.5 HYPERLIPIDEMIA LDL GOAL <100: ICD-10-CM

## 2021-06-15 DIAGNOSIS — R04.0 EPISTAXIS: ICD-10-CM

## 2021-06-15 DIAGNOSIS — J30.9 ALLERGIC RHINITIS, UNSPECIFIED SEASONALITY, UNSPECIFIED TRIGGER: ICD-10-CM

## 2021-06-15 DIAGNOSIS — R73.01 IMPAIRED FASTING GLUCOSE: ICD-10-CM

## 2021-06-15 PROBLEM — Z95.810 ICD (IMPLANTABLE CARDIOVERTER-DEFIBRILLATOR), BIVENTRICULAR, IN SITU: Status: ACTIVE | Noted: 2017-04-25

## 2021-06-15 LAB
ANION GAP SERPL CALCULATED.3IONS-SCNC: 11 MMOL/L (ref 5–18)
BUN SERPL-MCNC: 16 MG/DL (ref 8–28)
CALCIUM SERPL-MCNC: 9.2 MG/DL (ref 8.5–10.5)
CHLORIDE BLD-SCNC: 104 MMOL/L (ref 98–107)
CHOLEST SERPL-MCNC: 151 MG/DL
CO2 SERPL-SCNC: 24 MMOL/L (ref 22–31)
CREAT SERPL-MCNC: 1.07 MG/DL (ref 0.7–1.3)
ERYTHROCYTE [DISTWIDTH] IN BLOOD BY AUTOMATED COUNT: 12.9 % (ref 11–14.5)
FASTING STATUS PATIENT QL REPORTED: YES
GFR SERPL CREATININE-BSD FRML MDRD: >60 ML/MIN/1.73M2
GLUCOSE BLD-MCNC: 96 MG/DL (ref 70–125)
HCT VFR BLD AUTO: 46.3 % (ref 40–54)
HDLC SERPL-MCNC: 44 MG/DL
HGB BLD-MCNC: 15.7 G/DL (ref 14–18)
LDLC SERPL CALC-MCNC: 82 MG/DL
MCH RBC QN AUTO: 31.7 PG (ref 27–34)
MCHC RBC AUTO-ENTMCNC: 33.9 G/DL (ref 32–36)
MCV RBC AUTO: 94 FL (ref 80–100)
PLATELET # BLD AUTO: 151 THOU/UL (ref 140–440)
PMV BLD AUTO: 10.2 FL (ref 7–10)
POTASSIUM BLD-SCNC: 4.3 MMOL/L (ref 3.5–5)
RBC # BLD AUTO: 4.95 MILL/UL (ref 4.4–6.2)
SODIUM SERPL-SCNC: 139 MMOL/L (ref 136–145)
TRIGL SERPL-MCNC: 125 MG/DL
WBC: 5.9 THOU/UL (ref 4–11)

## 2021-06-15 NOTE — TELEPHONE ENCOUNTER
Patient stated [facility] received a refusal on RX.     Please send this new approval to [facility]. He is taking 5 mg daily (he used to take 10 mg and cut in 1/2)

## 2021-06-15 NOTE — TELEPHONE ENCOUNTER
Refill Approved    Rx renewed per Medication Renewal Policy. Medication was last renewed on 10/21/19.    Simon Graff, [facility] Connection Triage/Med Refill 3/9/2021     Requested Prescriptions   Pending Prescriptions Disp Refills     fluticasone propionate (FLONASE) 50 mcg/actuation nasal spray [Pharmacy Med Name: FLUTICASONE PROP NASAL SPRAY 16GM 50MCG] 48 g 3     Sig: USE 2 SPRAYS IN EACH NOSTRIL DAILY       Nasal Steroid Refill Protocol Passed - 3/8/2021 10:23 AM        Passed - Patient has had office visit/physical in last 2 years     Last office visit with prescriber/PCP: 11/8/2019 OR same dept: Visit date not found OR same specialty: 2/7/2020 Nury Shah MD Last physical: 12/15/2020 Last MTM visit: Visit date not found    Next appt within 3 mo: Visit date not found  Next physical within 3 mo: Visit date not found  Prescriber OR PCP: Adams Garcia MD  Last diagnosis associated with med order: 1. AR (allergic rhinitis)  - fluticasone propionate (FLONASE) 50 mcg/actuation nasal spray [Pharmacy Med Name: FLUTICASONE PROP NASAL SPRAY 16GM 50MCG]; USE 2 SPRAYS IN EACH NOSTRIL DAILY  Dispense: 48 g; Refill: 3     If protocol passes may refill for 12 months if within 3 months of last provider visit (or a total of 15 months).

## 2021-06-16 PROBLEM — K80.20 SYMPTOMATIC CHOLELITHIASIS: Status: ACTIVE | Noted: 2020-05-10

## 2021-06-16 PROBLEM — E66.01 MORBID OBESITY (H): Status: ACTIVE | Noted: 2020-12-15

## 2021-06-16 PROBLEM — R09.02 HYPOXIA: Status: ACTIVE | Noted: 2020-05-10

## 2021-06-16 PROBLEM — I48.0 PAROXYSMAL ATRIAL FIBRILLATION (H): Status: ACTIVE | Noted: 2019-09-16

## 2021-06-16 PROBLEM — K80.20 CHOLELITHIASIS: Status: ACTIVE | Noted: 2019-09-11

## 2021-06-16 PROBLEM — H91.93 BILATERAL HEARING LOSS: Status: ACTIVE | Noted: 2017-11-15

## 2021-06-16 PROBLEM — N52.9 ERECTILE DYSFUNCTION, UNSPECIFIED ERECTILE DYSFUNCTION TYPE: Status: ACTIVE | Noted: 2017-11-15

## 2021-06-16 PROBLEM — R10.13 EPIGASTRIC PAIN: Status: ACTIVE | Noted: 2020-05-10

## 2021-06-16 PROBLEM — K80.50 CHOLEDOCHOLITHIASIS: Status: ACTIVE | Noted: 2020-05-10

## 2021-06-17 NOTE — PATIENT INSTRUCTIONS - HE
Patient Instructions by Adams Garcia MD at 12/12/2019 11:40 AM     Author: Adams Garcia MD Service: -- Author Type: Physician    Filed: 12/12/2019 11:49 AM Encounter Date: 12/12/2019 Status: Signed    : Adams Garcia MD (Physician)         Patient Education     Exercise for a Healthier Heart  You may wonder how you can improve the health of your heart. If youre thinking about exercise, youre on the right track. You dont need to become an athlete, but you do need a certain amount of brisk exercise to help strengthen your heart. If you have been diagnosed with a heart condition, your doctor may recommend exercise to help stabilize your condition. To help make exercise a habit, choose safe, fun activities.       Be sure to check with your health care provider before starting an exercise program.    Why exercise?  Exercising regularly offers many healthy rewards. It can help you do all of the following:    Improve your blood cholesterol levels to help prevent further heart trouble    Lower your blood pressure to help prevent a stroke or heart attack    Control diabetes, or reduce your risk of getting this disease    Improve your heart and lung function    Reach and maintain a healthy weight    Make your muscles stronger and more limber so you can stay active    Prevent falls and fractures by slowing the loss of bone mass (osteoporosis)    Manage stress better  Exercise tips  Ease into your routine. Set small goals. Then build on them.  Exercise on most days. Aim for a total of 150 or more minutes of moderate to  vigorous intensity activity each week. Consider 40 minutes, 3 to 4 times a week. For best results, activity should last for 40 minutes on average. It is OK to work up to the 40 minute period over time. Examples of moderate-intensity activity is walking one mile in 15 minutes or 30 to 45 minutes of yard work.  Step up your daily activity level. Along with your exercise program, try being more  active throughout the day. Walk instead of drive. Do more household tasks or yard work.  Choose one or more activities you enjoy. Walking is one of the easiest things you can do. You can also try swimming, riding a bike, or taking an exercise class.  Stop exercising and call your doctor if you:    Have chest pain or feel dizzy or lightheaded    Feel burning, tightness, pressure, or heaviness in your chest, neck, shoulders, back, or arms    Have unusual shortness of breath    Have increased joint or muscle pain    Have palpitations or an irregular heartbeat      8826-6653 [employer]. [address] [place] [address]. All rights reserved. This information is not intended as a substitute for professional medical care. Always follow your healthcare professional's instructions.         Patient Education   Understanding [employer] MyPlate  The USDA (US Department of Agriculture) has guidelines to help you make healthy food choices. These are called MyPlate. MyPlate shows the food groups that make up healthy meals using the image of a place setting. Before you eat, think about the healthiest choices for what to put onto your plate or into your cup or bowl. To learn more about building a healthy plate, visit www.choosemyplate.gov.       The Food Groups    Fruits: Any fruit or 100% fruit juice counts as part of the Fruit Group. Fruits may be fresh, canned, frozen, or dried, and may be whole, cut-up, or pureed. Make half your plate fruits and vegetables.    Vegetables: Any vegetable or 100% vegetable juice counts as a member of the Vegetable Group. Vegetables may be fresh, frozen, canned, or dried. They can be served raw or cooked and may be whole, cut-up, or mashed. Make half your plate fruits and vegetables.     Grains: All foods made from grains are part of the Grains Group. These include wheat, rice, oats, cornmeal, and barley such as bread, pasta, oatmeal, cereal, tortillas, and grits. Grains should be  no more than a quarter of your plate. At least half of your grains should be whole grains.    Protein: This group includes meat, poultry, seafood, beans and peas, eggs, processed soy products (like tofu), nuts (including nut butters), and seeds. Make protein choices no more than a quarter of your plate. Meat and poultry choices should be lean or low fat.    Dairy: All fluid milk products and foods made from milk that contain calcium, like yogurt and cheese are part of the Dairy Group. (Foods that have little calcium, such as cream, butter, and cream cheese, are not part of the group.) Most dairy choices should be low-fat or fat-free.    Oils: These are fats that are liquid at room temperature. They include canola, corn, olive, soybean, and sunflower oil. Foods that are mainly oil include mayonnaise, certain salad dressings, and soft margarines. You should have only 5 to 7 teaspoons of oils a day. You probably already get this much from the food you eat.  Use [employer] to Help Build Your Meals  The [employer] can help you plan and track your meals and activity. You can look up individual foods to see or compare their nutritional value. You can get guidelines for what and how much you should eat. You can compare your food choices. And you can assess personal physical activities and see ways you can improve. Go to www.[employer].gov/supertracker/.    6663-5981 [employer]. [address] [place] [address]. All rights reserved. This information is not intended as a substitute for professional medical care. Always follow your healthcare professional's instructions.           Patient Education   Signs of Hearing Loss  Hearing loss is a problem shared by many people. In fact, it is one of the most common health conditions, particularly as people age. Most people over age 65 have some hearing loss, and by age 80, almost everyone does. Because hearing loss usually occurs slowly over the years,  you may not realize your hearing ability has gotten worse.       Have your hearing checked  Contact your [facility] care provider if you:    Have to strain to hear normal conversation.    Have to watch other peoples faces very carefully to follow what theyre saying.    Need to ask people to repeat what theyve said.    Often misunderstand what people are saying.    Turn the volume of the television or radio up so high that others complain.    Feel that people are mumbling when theyre talking to you.    Find that the effort to hear leaves you feeling tired and irritated.    Notice, when using the phone, that you hear better with 1 ear than the other.    [phone] [employer]. [address] [place] [address], [place] [address]. All rights reserved. This information is not intended as a substitute for professional medical care. Always follow your healthcare professional's instructions.         Patient Education   Understanding Advance Care Planning  Advance care planning is the process of deciding ones own future medical care. It helps ensure that if you cant speak for yourself, your wishes can still be carried out. The plan is a series of legal documents that note a persons wishes. The documents vary by state. Advance care planning may be done when a person has a serious illness that is expected to get worse. It may be done before major surgery. And it can help you and your family be prepared in case of a major illness or injury. Advance care planning helps with making decisions at these times.       A health care proxy is a person who acts as the voice of a patient when the patient cant speak for himself or herself. The name of this role varies by state. It may be called a Durable Medical Power of  or Durable Power of  for Healthcare. It may be called an agent, surrogate, or advocate. Or it may be called a representative or decision maker. It is an official duty that is identified by a legal document.  The document also varies by state.    Why Is Advance Care Planning Important?  If a person communicates their healthcare wishes:    They will be given medical care that matches their values and goals.    Their family members will not be forced to make decisions in a crisis with no guidance.  Creating a Plan  Making an advance care plan is often done in 3 steps:    Thinking about ones wishes. To create an advance care plan, you should think about what kind of medical treatment you would want if you lose the ability to communicate. Are there any situations in which you would refuse or stop treatment? Are there therapies you would want or not want? And whom do you want to make decisions for you? There are many places to learn more about how to plan for your care. Ask your doctor or  for resources.    Picking a health care proxy. This means choosing a trusted person to speak for you only when you cant speak for yourself. When you cannot make medical decisions, your proxy makes sure the instructions in your advance care plan are followed. A proxy does not make decisions based on his or her own opinions. They must put aside those opinions and values if needed, and carry out your wishes.    Filling out the legal documents. There are several kinds of legal documents for advance care planning. Each one tells health care providers your wishes. The documents may vary by state. They must be signed and may need to be witnessed or notarized. You can cancel or change them whenever you wish. Depending on your state, the documents may include a Healthcare Proxy form, Living Will, Durable Medical Power of , Advance Directive, or others.  The Familys Role  The best help a family can give is to support their loved ones wishes. Open and honest communication is vital. Family should express any concerns they have about the patients choices while the patient can still make decisions.    [id number] [employer]  [employer]. [address] [place] [address]. All rights reserved. This information is not intended as a substitute for professional medical care. Always follow your healthcare professional's instructions.         Also, [facility] offers a free, downloadable health care directive that allows you to share your treatment choices and personal preferences if you cannot communicate your wishes. It also allows you to appoint another person (called a health care agent) to make health care decisions if you are unable to do so. You can download an advance directive by going here: [email or web address]     Patient Education   Personalized Prevention Plan  You are due for the preventive services outlined below.  Your care team is available to assist you in scheduling these services.  If you have already completed any of these items, please share that information with your care team to update in your medical record.  Health Maintenance   Topic Date Due   ? ZOSTER VACCINES (2 of 3) 10/28/2008   ? FALL RISK ASSESSMENT  12/12/2019   ? MEDICARE ANNUAL WELLNESS VISIT  12/12/2019   ? COLONOSCOPY  09/30/2020   ? LIPID  12/12/2023   ? ADVANCE CARE PLANNING  12/12/2023   ? TD 18+ HE  06/12/2029   ? HEPATITIS C SCREENING  Completed   ? PNEUMOCOCCAL IMMUNIZATION 65+ LOW/MEDIUM RISK  Completed   ? INFLUENZA VACCINE RULE BASED  Completed

## 2021-06-17 NOTE — TELEPHONE ENCOUNTER
Refill Approved    Rx renewed per Medication Renewal Policy. Medication was last renewed on 6/13/20.    Simon Graff, [facility] Triage/Med Refill 5/11/2021     Requested Prescriptions   Pending Prescriptions Disp Refills     traZODone (DESYREL) 50 MG tablet [Pharmacy Med Name: TRAZODONE HCL TABS 50MG] 90 tablet 3     Sig: TAKE 1 TABLET AT BEDTIME       Tricyclics/Misc Antidepressant/Antianxiety Meds Refill Protocol Passed - 5/10/2021 12:35 PM        Passed - PCP or prescribing provider visit in last year     Last office visit with prescriber/PCP: 11/8/2019 Adams Garcia MD OR same dept: Visit date not found OR same specialty: 2/7/2020 Nury Shah MD  Last physical: 12/15/2020 Last MTM visit: Visit date not found   Next visit within 3 mo: Visit date not found  Next physical within 3 mo: Visit date not found  Prescriber OR PCP: Adams Garcia MD  Last diagnosis associated with med order: 1. Insomnia  - traZODone (DESYREL) 50 MG tablet [Pharmacy Med Name: TRAZODONE HCL TABS 50MG]; TAKE 1 TABLET AT BEDTIME  Dispense: 90 tablet; Refill: 3    If protocol passes may refill for 12 months if within 3 months of last provider visit (or a total of 15 months).

## 2021-06-18 NOTE — LETTER
Letter by Ciara Maier at      Author: Ciara Maier Service: -- Author Type: --    Filed:  Encounter Date: 3/11/2019 Status: (Other)       Víctor ALLAN Calderon  [address]   [facility] [address]      March 11, 2019      Dear Mr. Calderon,    RE: Remote Results    We are writing to you regarding your recent Remote ICD check from home. Your transmission was received successfully. Battery status is satisfactory at this time.     Your results are within normal limits.    Your next device appointment will be a remote check on June 13, 2019; this will occur automatically.    To schedule or reschedule, please call [phone] and press 1.    NOTE: If you would like to do an extra transmission, please call [phone] and press 3 to speak to a nurse BEFORE transmitting. This ensures that the Device Clinic staff is aware of the reason you are sending a transmission, and can follow-up with you after it has been reviewed.    We will be checking your implanted device from home (remotely) every three months unless otherwise instructed. We will need to see you in the clinic at least once a year. You may need to be seen in the clinic sooner depending on the results of your check.    Please be aware:    The follow-up schedule is like a Physician prescription.    Your remote monitor is paired to your specific implanted device.      Sincerely,    [facility]

## 2021-06-18 NOTE — PROGRESS NOTES
"Assessment/Plan:    1. Essential hypertension with goal blood pressure less than 130/80  Hypertension, stable.  Continues lisinopril 10 mg using half tablet daily metoprolol succinate 150 mg daily.  Check basic metabolic panel for medication monitoring.  - Basic Metabolic Panel    2. Ischemic cardiomyopathy  History of ischemic cardiomyopathy, improved with most recent ejection fraction 50% on echocardiogram January 4, 2016.  Repeat echocardiogram scheduled August 23, 2018 with follow-up with Dr. Sarmiento cardiology August 30, 2018 as noted.  Asymptomatic currently.    3. Impaired fasting glucose  History of impaired fasting glucose with A1c of 6.1% November 15, 2017 having increased from 5.2% previously.  Repeat A1c today.  Check fasting glucose.  Dietary and exercise modification for weight goal less than 220 pounds initially, less than 200 pounds ideally.  - Glycosylated Hemoglobin A1c    4. Hypercholesterolemia  Continue simvastatin 40 mg at bedtime for lipid management.  Patient requests repeat lipid cascade today.  - Lipid Cascade    5. AR (allergic rhinitis)  Fluticasone nasal spray using 2 sprays per nostril daily for allergic rhinitis management and nasal congestion associated with CPAP use.  - fluticasone (FLONASE) 50 mcg/actuation nasal spray; 2 sprays into each nostril daily.  Dispense: 48 g; Refill: 3    The following high BMI interventions were performed this visit: encouragement to exercise, weight monitoring, weight loss from baseline weight and lifestyle education regarding diet.  Ensure ongoing efforts to achieve weight goal < 220 pounds initially, < 200 pounds ideally.         Subjective:    Víctor ALLAN Calderon is seen today for follow-up evaluation.  Previously noted impaired fasting glucose with A1c of 6.1% November 15, 2017 after increasing from 5.2% previously.  Recently returned from [place] and now is \"starting a diet\" and understands weight goal less than 200 pounds ideally..  History of CAD " "status post cardiac arrest with ventricular tachycardia history.  Defibrillator in place.  Follows with cardiologist and is anticipating follow-up after the first of the year.  History of ischemic cardiomyopathy with prior ejection fraction 33% May 2015 with most recent echocardiogram showing improvement 50% 2016.  Continues lisinopril 10 mg using half tablet daily as well as metoprolol succinate 150 mg daily.  Blood pressures at home typically in the 120s over mid 70s.  Continues simvastatin 40 mg at bedtime for cholesterol management.  Comprehensive review of systems as above otherwise all negative.     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 left s/p T & A as child; dual chamber pacemaker placement on 2/9/15 after cardiac arrest.  Quit EtOH 02   ROMELIA on CPAP at 8 cm H2O  PHQ-9 score= (9/22/10)     Past Surgical History:   Procedure Laterality Date     CARDIAC CATHETERIZATION       COLONOSCOPY N/A 2015    Procedure: COLONOSCOPY;  Surgeon: Rafiq Gu MD;  [facility]: [facility];  Service:      HERNIA REPAIR      inguinal      INSERT / REPLACE / REMOVE PACEMAKER       polyp      removed during colonoscopy     NM REMOVAL OF SPERM DUCT(S)      Description: Surgery Of Male Genitalia Vasectomy;  Recorded: 2008;     NM REMOVE TONSILS/ADENOIDS,<11 Y/O      Description: Tonsillectomy With Adenoidectomy;  Recorded: 2008;     TUMOR REMOVAL      right vocal cord -         Family History   Problem Relation Age of Onset     Pneumonia Mother      " "Hypertension Mother      Cancer Father      COPD Sister      Multiple sclerosis Sister      Acute Myocardial Infarction Neg Hx         Past Medical History:   Diagnosis Date     Cholelithiasis      Coronary artery disease      Dermatitis      GERD (gastroesophageal reflux disease)      H/O cardiac arrest 2/2015    alka/torsades     Hyperlipidemia      Hypertension      Hypogonadism male      Left eye complaint     \"high pressure\"     Male erectile disorder      Obesity      Sleep apnea      Ventricular tachycardia     seen on pacer interrogation        Social History   Substance Use Topics     Smoking status: Former Smoker     Types: Cigarettes     Quit date: 6/1/2008     Smokeless tobacco: Never Used     Alcohol use No        Current Outpatient Prescriptions   Medication Sig Dispense Refill     aspirin 81 MG EC tablet Take 81 mg by mouth daily.       calcium carbonate (CALCIUM CARBONATE) 300 mg (750 mg) Chew Chew as needed.        coenzyme Q10 (CO Q-10) 200 mg capsule Take 200 mg by mouth 2 (two) times a day.       DIPHENHYDRAMINE HCL (BENADRYL ALLERGY ORAL) Take by mouth as needed.       lisinopril (PRINIVIL,ZESTRIL) 10 MG tablet TAKE ONE-HALF (1/2) TABLET DAILY EVERY MORNING 45 tablet 0     metoprolol succinate (TOPROL-XL) 100 MG 24 hr tablet TAKE 1 TABLET AT BEDTIME 90 tablet 0     metoprolol succinate (TOPROL-XL) 50 MG 24 hr tablet Take 1 tablet of 50 mg PLUS 1 tablet of 100 mg daily for total dose of 150 mg 90 tablet 1     omeprazole (PRILOSEC) 20 MG capsule Take 1 capsule (20 mg total) by mouth as needed. 90 capsule 3     psyllium with dextrose (METAMUCIL JAR) powder Take by mouth Daily after lunch.       sildenafil (VIAGRA) 50 MG tablet Take 1 tablet (50 mg total) by mouth daily as needed for erectile dysfunction. 60 tablet 1     simvastatin (ZOCOR) 40 MG tablet TAKE 1 TABLET AT BEDTIME 90 tablet 0     testosterone (ANDROGEL) 1 % (50 mg/5 gram) GlPk APPLY 1 TUBE (5 GM) TOPICALLY DAILY AS DIRECTED 150 packet " 3     traMADol (ULTRAM) 50 mg tablet Take 1-2 tablets ( mg total) by mouth every 6 (six) hours as needed for pain. Take 1-2 tablets every 6 hours as needed for pain 40 tablet 1     traZODone (DESYREL) 100 MG tablet Take 0.5 tablets (50 mg total) by mouth at bedtime. 45 tablet 3     fluticasone (FLONASE) 50 mcg/actuation nasal spray 2 sprays into each nostril daily. 48 g 3     NITROSTAT 0.4 mg SL tablet Place 0.4 mg under the tongue every 5 (five) minutes as needed.        No current facility-administered medications for this visit.           Objective:    Vitals:    06/12/18 1123   BP: 120/70   Pulse: 61   SpO2: 97%   Weight: (!) 233 lb (105.7 kg)      Body mass index is 32.96 kg/(m^2).    Alert.  No apparent distress.  BMI 33.  Chest clear.  Cardiac exam regular.  Abdomen benign, obese.  Extremities warm and dry.  Smiling.      This note has been dictated using voice recognition software and as a result may contain minor grammatical errors and unintended word substitutions.

## 2021-06-18 NOTE — PATIENT INSTRUCTIONS - HE
Patient Instructions by Adams Garcia MD at 12/15/2020  1:40 PM     Author: Adams Garcia MD Service: -- Author Type: Physician    Filed: 12/15/2020  2:02 PM Encounter Date: 12/15/2020 Status: Signed    : Adams Garcia MD (Physician)         Patient Education     Exercise for a Healthier Heart  You may wonder how you can improve the health of your heart. If youre thinking about exercise, youre on the right track. You dont need to become an athlete, but you do need a certain amount of brisk exercise to help strengthen your heart. If you have been diagnosed with a heart condition, your doctor may recommend exercise to help stabilize your condition. To help make exercise a habit, choose safe, fun activities.       Be sure to check with your health care provider before starting an exercise program.    Why exercise?  Exercising regularly offers many healthy rewards. It can help you do all of the following:    Improve your blood cholesterol levels to help prevent further heart trouble    Lower your blood pressure to help prevent a stroke or heart attack    Control diabetes, or reduce your risk of getting this disease    Improve your heart and lung function    Reach and maintain a healthy weight    Make your muscles stronger and more limber so you can stay active    Prevent falls and fractures by slowing the loss of bone mass (osteoporosis)    Manage stress better  Exercise tips  Ease into your routine. Set small goals. Then build on them.  Exercise on most days. Aim for a total of 150 or more minutes of moderate to  vigorous intensity activity each week. Consider 40 minutes, 3 to 4 times a week. For best results, activity should last for 40 minutes on average. It is OK to work up to the 40 minute period over time. Examples of moderate-intensity activity is walking one mile in 15 minutes or 30 to 45 minutes of yard work.  Step up your daily activity level. Along with your exercise program, try being more  active throughout the day. Walk instead of drive. Do more household tasks or yard work.  Choose one or more activities you enjoy. Walking is one of the easiest things you can do. You can also try swimming, riding a bike, or taking an exercise class.  Stop exercising and call your doctor if you:    Have chest pain or feel dizzy or lightheaded    Feel burning, tightness, pressure, or heaviness in your chest, neck, shoulders, back, or arms    Have unusual shortness of breath    Have increased joint or muscle pain    Have palpitations or an irregular heartbeat      8635-5945 [employer]. [address] [place] [address]. All rights reserved. This information is not intended as a substitute for professional medical care. Always follow your healthcare professional's instructions.         Patient Education   Understanding [employer] MyPlate  The USDA (US Department of Agriculture) has guidelines to help you make healthy food choices. These are called MyPlate. MyPlate shows the food groups that make up healthy meals using the image of a place setting. Before you eat, think about the healthiest choices for what to put onto your plate or into your cup or bowl. To learn more about building a healthy plate, visit www.choosemyplate.gov.       The Food Groups    Fruits: Any fruit or 100% fruit juice counts as part of the Fruit Group. Fruits may be fresh, canned, frozen, or dried, and may be whole, cut-up, or pureed. Make half your plate fruits and vegetables.    Vegetables: Any vegetable or 100% vegetable juice counts as a member of the Vegetable Group. Vegetables may be fresh, frozen, canned, or dried. They can be served raw or cooked and may be whole, cut-up, or mashed. Make half your plate fruits and vegetables.     Grains: All foods made from grains are part of the Grains Group. These include wheat, rice, oats, cornmeal, and barley such as bread, pasta, oatmeal, cereal, tortillas, and grits. Grains should be  no more than a quarter of your plate. At least half of your grains should be whole grains.    Protein: This group includes meat, poultry, seafood, beans and peas, eggs, processed soy products (like tofu), nuts (including nut butters), and seeds. Make protein choices no more than a quarter of your plate. Meat and poultry choices should be lean or low fat.    Dairy: All fluid milk products and foods made from milk that contain calcium, like yogurt and cheese are part of the Dairy Group. (Foods that have little calcium, such as cream, butter, and cream cheese, are not part of the group.) Most dairy choices should be low-fat or fat-free.    Oils: These are fats that are liquid at room temperature. They include canola, corn, olive, soybean, and sunflower oil. Foods that are mainly oil include mayonnaise, certain salad dressings, and soft margarines. You should have only 5 to 7 teaspoons of oils a day. You probably already get this much from the food you eat.  Use [employer] to Help Build Your Meals  The [employer] can help you plan and track your meals and activity. You can look up individual foods to see or compare their nutritional value. You can get guidelines for what and how much you should eat. You can compare your food choices. And you can assess personal physical activities and see ways you can improve. Go to www.shoutr.gov/supertracker/.    0949-8478 The [employer]. [address] [place] [address]. All rights reserved. This information is not intended as a substitute for professional medical care. Always follow your healthcare professional's instructions.           Patient Education   Signs of Hearing Loss  Hearing loss is a problem shared by many people. In fact, it is one of the most common health conditions, particularly as people age. Most people over age 65 have some hearing loss, and by age 80, almost everyone does. Because hearing loss usually occurs slowly over the years,  you may not realize your hearing ability has gotten worse.       Have your hearing checked  Contact your [facility] care provider if you:    Have to strain to hear normal conversation.    Have to watch other peoples faces very carefully to follow what theyre saying.    Need to ask people to repeat what theyve said.    Often misunderstand what people are saying.    Turn the volume of the television or radio up so high that others complain.    Feel that people are mumbling when theyre talking to you.    Find that the effort to hear leaves you feeling tired and irritated.    Notice, when using the phone, that you hear better with 1 ear than the other.    3438-0308 The [employer]. [address] [place] [address], [place] [address]. All rights reserved. This information is not intended as a substitute for professional medical care. Always follow your healthcare professional's instructions.         Patient Education   Understanding Advance Care Planning  Advance care planning is the process of deciding ones own future medical care. It helps ensure that if you cant speak for yourself, your wishes can still be carried out. The plan is a series of legal documents that note a persons wishes. The documents vary by state. Advance care planning may be done when a person has a serious illness that is expected to get worse. It may be done before major surgery. And it can help you and your family be prepared in case of a major illness or injury. Advance care planning helps with making decisions at these times.       A health care proxy is a person who acts as the voice of a patient when the patient cant speak for himself or herself. The name of this role varies by state. It may be called a Durable Medical Power of  or Durable Power of  for Healthcare. It may be called an agent, surrogate, or advocate. Or it may be called a representative or decision maker. It is an official duty that is identified by a legal document.  The document also varies by state.    Why Is Advance Care Planning Important?  If a person communicates their healthcare wishes:    They will be given medical care that matches their values and goals.    Their family members will not be forced to make decisions in a crisis with no guidance.  Creating a Plan  Making an advance care plan is often done in 3 steps:    Thinking about ones wishes. To create an advance care plan, you should think about what kind of medical treatment you would want if you lose the ability to communicate. Are there any situations in which you would refuse or stop treatment? Are there therapies you would want or not want? And whom do you want to make decisions for you? There are many places to learn more about how to plan for your care. Ask your doctor or  for resources.    Picking a health care proxy. This means choosing a trusted person to speak for you only when you cant speak for yourself. When you cannot make medical decisions, your proxy makes sure the instructions in your advance care plan are followed. A proxy does not make decisions based on his or her own opinions. They must put aside those opinions and values if needed, and carry out your wishes.    Filling out the legal documents. There are several kinds of legal documents for advance care planning. Each one tells health care providers your wishes. The documents may vary by state. They must be signed and may need to be witnessed or notarized. You can cancel or change them whenever you wish. Depending on your state, the documents may include a Healthcare Proxy form, Living Will, Durable Medical Power of , Advance Directive, or others.  The Familys Role  The best help a family can give is to support their loved ones wishes. Open and honest communication is vital. Family should express any concerns they have about the patients choices while the patient can still make decisions.    5457-1962 [employer],  [employer]. [address] [place] [address]. All rights reserved. This information is not intended as a substitute for professional medical care. Always follow your healthcare professional's instructions.         Also, [facility] offers a free, downloadable health care directive that allows you to share your treatment choices and personal preferences if you cannot communicate your wishes. It also allows you to appoint another person (called a health care agent) to make health care decisions if you are unable to do so. You can download an advance directive by going here: [email or web address]     Patient Education   Personalized Prevention Plan  You are due for the preventive services outlined below.  Your care team is available to assist you in scheduling these services.  If you have already completed any of these items, please share that information with your care team to update in your medical record.  Health Maintenance   Topic Date Due   ? HEPATITIS B VACCINES (1 of 3 - Risk 3-dose series) 07/02/1965   ? ZOSTER VACCINES (2 of 3) 10/28/2008   ? COLORECTAL CANCER SCREENING  09/30/2020   ? MEDICARE ANNUAL WELLNESS VISIT  12/15/2021   ? FALL RISK ASSESSMENT  12/15/2021   ? LIPID  12/12/2024   ? ADVANCE CARE PLANNING  12/12/2024   ? TD 18+ HE  06/12/2029   ? HEPATITIS C SCREENING  Completed   ? Pneumococcal Vaccine: 65+ Years  Completed   ? INFLUENZA VACCINE RULE BASED  Completed   ? Pneumococcal Vaccine: Pediatrics (0 to 5 Years) and At-Risk Patients (6 to 64 Years)  Aged Out

## 2021-06-19 NOTE — LETTER
Letter by Margarita Wooten EPS at      Author: Margarita Wooten EPS Service: -- Author Type: --    Filed:  Encounter Date: 6/13/2019 Status: (Other)         Víctor ALLAN Calderon  [address] [place] [address]      June 13, 2019      Dear Nadeen Kathleenajit,    RE: Remote Results    We are writing to you regarding your recent Remote ICD check from home. Your transmission was received successfully. Battery status is satisfactory at this time.     Your results are within normal limits.    Your next device appointment will be a clinic visit.  Please call in June to schedule.      To schedule or reschedule, please call [phone] and press 1.    NOTE: If you would like to do an extra transmission, please call [phone] and press 3 to speak to a nurse BEFORE transmitting. This ensures that the Device Clinic staff is aware of the reason you are sending a transmission, and can follow-up with you after it has been reviewed.    We will be checking your implanted device from home (remotely) every three months unless otherwise instructed. We will need to see you in the clinic at least once a year. You may need to be seen in the clinic sooner depending on the results of your check.    Please be aware:    The follow-up schedule is like a Physician prescription.    Your remote monitor is paired to your specific implanted device.      Sincerely,    [facility]

## 2021-06-19 NOTE — LETTER
Letter by Ritika Kovacs CNP at      Author: Ritika Kovacs CNP Service: -- Author Type: --    Filed:  Encounter Date: 4/9/2019 Status: (Other)         April 9, 2019     Patient: Víctor Calderon   YOB: 1945           Order: CPAP machine and associated supplies: nasal pillow - 2 every 1 month, chinstrap - 1 every 6 months, headgear - 1 every 6 months, heated tubing - 1 every 3 months, permanent filter - 1 every 6 months, disposable filters - 2 every 1 month, water chamber - 1 every 6 months, heated humidity - 1 every 5 years.     CPAP setting 10 cm H20    Diagnosis: Obstructive Sleep Apnea G47.33  Justification: ROMELIA benefits from treatment    Length of need: Lifetime  Face to face: 4/9/2019    Original sleep study 9/9/2012 at [facility]        Ritika Kovcas CNP:____________________________________________ Date:_______________________   NPI: [id number]

## 2021-06-19 NOTE — LETTER
Letter by Ritika Kovacs CNP at      Author: Ritika Kovacs CNP Service: -- Author Type: --    Filed:  Encounter Date: 4/9/2019 Status: (Other)         April 9, 2019     Patient: Víctor Calderon   YOB: 1945   Date of Visit: 4/9/2019       To Whom it May Concern:    Víctor Calderon was seen in my clinic on 4/9/2019.    If you have any questions or concerns, please don't hesitate to call.    Sincerely,         Electronically signed by Ritika Kovacs CNP

## 2021-06-20 ENCOUNTER — HEALTH MAINTENANCE LETTER (OUTPATIENT)
Age: 76
End: 2021-06-20

## 2021-06-20 NOTE — LETTER
Letter by Phoebe Saha RDCS at      Author: Phoebe Saha RDCS Service: -- Author Type: --    Filed:  Encounter Date: 6/10/2020 Status: (Other)         Víctor Calderon  [address] [place] [address]      Andressa 10, 2020      Dear Mr. Calderon,    RE: Remote Results    We are writing to you regarding your recent Remote ICD check from home. Your transmission was received successfully. Battery status is satisfactory at this time.     Your results are showing no significant changes.    Your next device appointment will be a clinic visit.  Please call in late July to schedule.      To schedule or reschedule, please call [phone] and press 1.    NOTE: If you would like to do an extra transmission, please call [phone] and press 3 to speak to a nurse BEFORE transmitting. This ensures that the Device Clinic staff is aware of the reason you are sending a transmission, and can follow-up with you after it has been reviewed.    We will be checking your implanted device from home (remotely) every three months unless otherwise instructed. We will need to see you in the clinic at least once a year. You may need to be seen in the clinic sooner depending on the results of your check.    Please be aware:    The follow-up schedule is like a Physician prescription.    Your remote monitor is paired to your specific implanted device.      Sincerely,    [facility]

## 2021-06-20 NOTE — PROGRESS NOTES
In clinic device check with Device Nurse followed by office visit with Dr. Frost.  Please see link for full device report.  Patient was informed of results and next follow up during today's visit.

## 2021-06-20 NOTE — LETTER
Letter by Rafal Frost MD at      Author: Rafal Frost MD Service: -- Author Type: --    Filed:  Encounter Date: 7/14/2020 Status: (Other)         Víctor LEMUS Yumiko  [address] [place] [address]      July 14, 2020      Dear  Frannieajit,    RE: Remote Results    We are writing to you regarding your recent Remote ICD check from home. Your transmission was received successfully. Battery status is satisfactory at this time.     Your results are showing no significant changes.    Your next device appointment will be a remote check on 10/19/2020; this will occur automatically.    To schedule or reschedule, please call [phone] and press 1.    NOTE: If you would like to do an extra transmission, please call [phone] and press 3 to speak to a nurse BEFORE transmitting. This ensures that the Device Clinic staff is aware of the reason you are sending a transmission, and can follow-up with you after it has been reviewed.    We will be checking your implanted device from home (remotely) every three months unless otherwise instructed. We will need to see you in the clinic at least once a year. You may need to be seen in the clinic sooner depending on the results of your check.    Please be aware:    The follow-up schedule is like a Physician prescription.    Your remote monitor is paired to your specific implanted device.      Sincerely,    [facility]

## 2021-06-20 NOTE — LETTER
Letter by Phoebe Saha RDCS at      Author: Phoebe Saha RDCS Service: -- Author Type: --    Filed:  Encounter Date: 12/10/2019 Status: Signed         Víctor LEMUS Yumiko  [address] [place] [address]      December 10, 2019      Dear Mr. Calderon,    RE: Remote Results    We are writing to you regarding your recent Remote ICD check from home. Your transmission was received successfully. Battery status is satisfactory at this time.     Your results are being reviewed.  You will be contacted if further information is necessary.     Your next device appointment will be a remote check on March 10, 2020; this will occur automatically.    To schedule or reschedule, please call [phone] and press 1.    NOTE: If you would like to do an extra transmission, please call [phone] and press 3 to speak to a nurse BEFORE transmitting. This ensures that the Device Clinic staff is aware of the reason you are sending a transmission, and can follow-up with you after it has been reviewed.    We will be checking your implanted device from home (remotely) every three months unless otherwise instructed. We will need to see you in the clinic at least once a year. You may need to be seen in the clinic sooner depending on the results of your check.    Please be aware:    The follow-up schedule is like a Physician prescription.    Your remote monitor is paired to your specific implanted device.      Sincerely,    [facility]

## 2021-06-20 NOTE — LETTER
Letter by Ciara Maier at      Author: Ciara Maier Service: -- Author Type: --    Filed:  Encounter Date: 3/10/2020 Status: (Other)         Víctor Calderon  [address]   [facility] [address]      March 10, 2020      Dear Mr. Calderon,    RE: Remote Results    We are writing to you regarding your recent Remote ICD check from home. Your transmission was received successfully. Battery status is satisfactory at this time.     Your results are within normal limits.    Your next device appointment will be a remote check on Andressa 10, 2020; this will occur automatically.    To schedule or reschedule, please call [phone] and press 1.    NOTE: If you would like to do an extra transmission, please call [phone] and press 3 to speak to a nurse BEFORE transmitting. This ensures that the Device Clinic staff is aware of the reason you are sending a transmission, and can follow-up with you after it has been reviewed.    We will be checking your implanted device from home (remotely) every three months unless otherwise instructed. We will need to see you in the clinic at least once a year. You may need to be seen in the clinic sooner depending on the results of your check.    Please be aware:    The follow-up schedule is like a Physician prescription.    Your remote monitor is paired to your specific implanted device.      Sincerely,    [facility]

## 2021-06-20 NOTE — PROGRESS NOTES
[facility] Office Note    Assessment / Plan:    1.  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  Status post CRT-D device placement.  Doing well.  No changes today  2.  Obstructive sleep apnea on CPAP.   3.  Obesity.  Congratulated on his weight loss and encouraged to continue his efforts      Plan follow-up in 1 year    ______________________________________________________________________    Subjective:    I had the opportunity to see Víctor Calderon at the [facility]. Víctor Calderon is a 73 y.o. male with a history of bradycardia.  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  He underwent CRT-D therapy.  Since then there has been gradual improvement in his ejection fraction.  An echocardiogram done 1 year ago showed an improvement in his ejection fraction up to 50%.  He has no history of significant coronary artery disease.  Recent recheck of an echocardiogram is unchanged.    Device check today shows normal function #100% biventricular paced    He is using CPAP regularly with good restoration.  Reports he is losing weight with a carbohydrate restricted diet.  Walks his dogs with his wife and travels.  He feels well and offers no complaints today.      ______________________________________________________________________    Problem List:  Patient Active Problem List   Diagnosis     Skin Neoplasm Of Uncertain Behavior     Hypercholesterolemia     Obstructive Sleep Apnea     Esophageal Reflux     Impaired Fasting Glucose     Changed Sexual Interest (Libido): Decreased     Cholelithiasis     Laryngeal Neoplasm Of The Vocal Cord     Hypertension     Dermatitis     Hoarseness     Male Erectile Disorder     Hypogonadism     Insomnia     Benign Tubular Adenoma Of The Large Intestine     Obesity     Skin Tag (___ Mm)     Tachycardia     Bradycardia     Cardiac arrest (H)     Heart block AV complete (H)     Right ventricular  "dysfunction     CAD (coronary artery disease)     Normochromic normocytic anemia     NSVT (nonsustained ventricular tachycardia) (H)     Ischemic cardiomyopathy     Complete AV block, acquired (H)     Adenomatous colon polyp     Tubular adenoma of colon     ROMELIA on CPAP     ICD (implantable cardioverter-defibrillator), biventricular, in situ     Bilateral hearing loss     Erectile dysfunction, unspecified erectile dysfunction type     Medical History:  Past Medical History:   Diagnosis Date     Cholelithiasis      Coronary artery disease      Dermatitis      GERD (gastroesophageal reflux disease)      H/O cardiac arrest 2/2015    alka/torsades     Hyperlipidemia      Hypertension      Hypogonadism male      Left eye complaint     \"high pressure\"     Male erectile disorder      Obesity      Sleep apnea      Ventricular tachycardia (H)     seen on pacer interrogation     Surgical History:  Past Surgical History:   Procedure Laterality Date     CARDIAC CATHETERIZATION       COLONOSCOPY N/A 9/30/2015    Procedure: COLONOSCOPY;  Surgeon: Rafiq Gu MD;  Location: [facility];  Service:      HERNIA REPAIR      inguinal      INSERT / REPLACE / REMOVE PACEMAKER       polyp  2010    removed during colonoscopy     OH REMOVAL OF SPERM DUCT(S)      Description: Surgery Of Male Genitalia Vasectomy;  Recorded: 02/22/2008;     OH REMOVE TONSILS/ADENOIDS,<13 Y/O      Description: Tonsillectomy With Adenoidectomy;  Recorded: 02/22/2008;     TUMOR REMOVAL  2008    right vocal cord -      Social History:  Social History     Social History     Marital status:      Spouse name: N/A     Number of children: N/A     Years of education: N/A     Occupational History     Not on file.     Social History Main Topics     Smoking status: Former Smoker     Types: Cigarettes     Quit date: 6/1/2008     Smokeless tobacco: Never Used     Alcohol use No     Drug use: No     Sexual activity: Not on file     Other Topics Concern     " Not on file     Social History Narrative     Sleep History:  Uses CPAP nightly with good restoration  Exercise History:  Walks dogs daily.  Travels.  Stamina stable.    Review of Systems:   General: WNL  Eyes: WNL  Ears/Nose/Throat: WNL  Lungs: WNL  Heart: WNL  Stomach: WNL  Bladder: WNL  Muscle/Joints: WNL  Skin: WNL  Nervous System: WNL  Mental Health: WNL     Blood: WNL          Family History:  Family History   Problem Relation Age of Onset     Pneumonia Mother      Hypertension Mother      Cancer Father      COPD Sister      Multiple sclerosis Sister      Acute Myocardial Infarction Neg Hx          Allergies:  No Known Allergies  Medications:  Current Outpatient Prescriptions   Medication Sig Dispense Refill     aspirin 81 MG EC tablet Take 81 mg by mouth daily.       calcium carbonate (CALCIUM CARBONATE) 300 mg (750 mg) Chew Chew as needed.        coenzyme Q10 (CO Q-10) 200 mg capsule Take 200 mg by mouth 2 (two) times a day.       DIPHENHYDRAMINE HCL (BENADRYL ALLERGY ORAL) Take by mouth as needed.       fluticasone (FLONASE) 50 mcg/actuation nasal spray 2 sprays into each nostril daily. 48 g 3     lisinopril (PRINIVIL,ZESTRIL) 10 MG tablet TAKE ONE-HALF (1/2) TABLET DAILY EVERY MORNING 45 tablet 0     metoprolol succinate (TOPROL-XL) 100 MG 24 hr tablet TAKE 1 TABLET AT BEDTIME 90 tablet 0     metoprolol succinate (TOPROL-XL) 50 MG 24 hr tablet TAKE 1 TABLET PLUS 1 TABLET  MG DAILY FOR TOTAL DOSE  MG 90 tablet 0     omeprazole (PRILOSEC) 20 MG capsule Take 1 capsule (20 mg total) by mouth as needed. 90 capsule 3     psyllium with dextrose (METAMUCIL JAR) powder Take by mouth Daily after lunch.       sildenafil (VIAGRA) 100 MG tablet Take 0.5 tablets (50 mg total) by mouth daily as needed for erectile dysfunction. 30 tablet 5     simvastatin (ZOCOR) 40 MG tablet TAKE 1 TABLET AT BEDTIME 90 tablet 0     testosterone (ANDROGEL) 1 % (50 mg/5 gram) GlPk APPLY 1 TUBE (5 GM) TOPICALLY DAILY AS DIRECTED  150 packet 3     traMADol (ULTRAM) 50 mg tablet Take 1-2 tablets ( mg total) by mouth every 6 (six) hours as needed for pain. Take 1-2 tablets every 6 hours as needed for pain 40 tablet 1     traZODone (DESYREL) 100 MG tablet Take 0.5 tablets (50 mg total) by mouth at bedtime. 45 tablet 3     NITROSTAT 0.4 mg SL tablet Place 0.4 mg under the tongue every 5 (five) minutes as needed.        No current facility-administered medications for this visit.        Objective:   Wt Readings from Last 3 Encounters:   08/30/18 (!) 224 lb (101.6 kg)   08/23/18 (!) 233 lb (105.7 kg)   06/12/18 (!) 233 lb (105.7 kg)     Vital signs:  /62 (Patient Site: Left Arm, Patient Position: Sitting, Cuff Size: Adult Large)  Pulse 68  Resp 16  Wt (!) 224 lb (101.6 kg)  BMI 31.69 kg/m2      Physical Exam:    GENERAL APPEARANCE: Alert, cooperative and in no acute distress.  HEENT: Conjunctivae  slightly injected.  No scleral icterus. No Xanthelasma. Oral mucous membranes pink and moist.  NECK: No JVD.  No Hepatojugular reflux. Thyroid not enlarged.   CHEST: clear to auscultation  CARDIOVASCULAR: regular S1, S2 without murmur ,clicks or rubs.  Nonpalpable point of maximal impulse.. Brachial, radial and posterior tibial pulses are intact and symetric. No carotid bruits noted.  ABDOMEN obese, soft. Nontender. BS+. No bruits.  EXTREMITIES: No cyanosis, clubbing or edema.    Lab Results:  LIPIDS:  Lab Results   Component Value Date    CHOL 138 06/12/2018    CHOL 128 11/15/2017    CHOL 162 11/17/2016     Lab Results   Component Value Date    HDL 41 06/12/2018    HDL 42 11/15/2017    HDL 50 11/17/2016     Lab Results   Component Value Date    LDLCALC 76 06/12/2018    LDLCALC 65 11/15/2017    LDLCALC 81 11/17/2016     Lab Results   Component Value Date    TRIG 103 06/12/2018    TRIG 106 11/15/2017    TRIG 155 (H) 11/17/2016       Echocardiogram 5/2018:  Summary     Left ventricle ejection fraction is mildly decreased. The calculated left  ventricular ejection fraction is 51%.    Normal left ventricular size.    Normal right ventricular size and systolic function.    Aortic Valve: The aortic valve is sclerotic without reduced excursion. Mild aortic regurgitation.    No hemodynamically significant valvular heart abnormalities.    When compared to the previous study dated 1/4/2016, no significant change.                   SAYRA PRATHER MD [facility]  [phone]    This note created using Dragon voice recognition software.  Sound alike errors may have escaped editing.

## 2021-06-20 NOTE — PROGRESS NOTES
Assessment and Plan:     1. Abdominal swelling, left lower quadrant  HM2(CBC w/o Differential)    US Hernia Evaluation    CANCELED: US Hernia Evaluation   2. Constipation, unspecified constipation type     3. Hypertension       Differentials for abdominal swelling and pain include diverticulitis, hernia, muscular strain, constipation.  Will check hemogram.  If white blood count is elevated, will obtain CT scan to rule out diverticulitis.  If normal, will obtain ultrasound to rule out hernia.  Discussed treating constipation with daily MiraLAX and increasing fluid and fiber intake.  Patient is to avoid straining with bowel movements.  He is to avoid heavy lifting in the meantime.  Blood pressure is well controlled today.  He is content with the plan will follow with Dr. Garcia if symptoms persist or worsen.    Subjective:     Víctor is a 73 y.o. male presenting to the clinic for concerns for left sided abdominal pain and swelling.  Patient has multiple comorbidities including hypercholesterolemia, ROMELIA, GERD, impaired fasting glucose, hypertension, CAD, ischemic cardiomyopathy.  Patient states he has a history of constipation.  He takes stool softeners and Metamucil regularly.  Last Thursday, he was having difficulty having a bowel movement.  Patient admits to straining.  He developed left mid abdominal pain 1 hour later.  Patient states he had pain primarily with pressing on the area.  2 days later, he noticed swelling in the area.  He has been wearing a back belt which is assisting with the pain.  Patient's symptoms have improved today.  He notes there is no pain unless he moves from a sitting to standing position or bends over.  He denies nausea, vomiting, fever.  He has a history of a right inguinal hernia at the age of 8 which was repaired.  In 2015, he had a colonoscopy showing diverticulosis.    Review of Systems: A complete 14 point review of systems was obtained and is negative or as stated in the history of  present illness.    Social History     Social History     Marital status:      Spouse name: N/A     Number of children: N/A     Years of education: N/A     Occupational History     Not on file.     Social History Main Topics     Smoking status: Former Smoker     Types: Cigarettes     Quit date: 6/1/2008     Smokeless tobacco: Never Used     Alcohol use No     Drug use: No     Sexual activity: Not on file     Other Topics Concern     Not on file     Social History Narrative       Active Ambulatory Problems     Diagnosis Date Noted     Skin Neoplasm Of Uncertain Behavior      Hypercholesterolemia      Obstructive Sleep Apnea      Esophageal Reflux      Impaired Fasting Glucose      Changed Sexual Interest (Libido): Decreased      Cholelithiasis      Laryngeal Neoplasm Of The Vocal Cord      Hypertension      Dermatitis      Hoarseness      Male Erectile Disorder      Hypogonadism      Insomnia      Benign Tubular Adenoma Of The Large Intestine      Obesity      Skin Tag (___ Mm)      Tachycardia      Bradycardia 12/19/2014     Cardiac arrest (H) 02/05/2015     Heart block AV complete (H) 02/07/2015     Right ventricular dysfunction 02/07/2015     CAD (coronary artery disease) 02/11/2015     Normochromic normocytic anemia 04/21/2015     NSVT (nonsustained ventricular tachycardia) (H) 06/05/2015     Ischemic cardiomyopathy 06/05/2015     Complete AV block, acquired (H) 06/29/2015     Adenomatous colon polyp 05/17/2016     Tubular adenoma of colon 11/17/2016     ROMELIA on CPAP 11/17/2016     ICD (implantable cardioverter-defibrillator), biventricular, in situ 04/25/2017     Bilateral hearing loss 11/15/2017     Erectile dysfunction, unspecified erectile dysfunction type 11/15/2017     Resolved Ambulatory Problems     Diagnosis Date Noted     No Resolved Ambulatory Problems     Past Medical History:   Diagnosis Date     Cholelithiasis      Coronary artery disease      Dermatitis      GERD (gastroesophageal reflux  "disease)      H/O cardiac arrest 2/2015     Hyperlipidemia      Hypertension      Hypogonadism male      Left eye complaint      Male erectile disorder      Obesity      Sleep apnea      Ventricular tachycardia (H)        Family History   Problem Relation Age of Onset     Pneumonia Mother      Hypertension Mother      Cancer Father      COPD Sister      Multiple sclerosis Sister      Acute Myocardial Infarction Neg Hx        Objective:     /80  Pulse 70  Ht 5' 10.5\" (1.791 m)  Wt (!) 224 lb 12.8 oz (102 kg)  BMI 31.8 kg/m2    Patient is alert, in no obvious distress.   Skin: Warm, dry.   Lungs:  Clear to auscultation. Respirations even and unlabored.  No wheezing or rales noted.   Heart:  Regular rate and rhythm.  No murmurs, S3, S4, gallops, or rubs.    Abdomen: Soft, tenderness to palpation left mid abdomen.  There is a mild swelling present when compared to the right side.  There is a small one in area of firmness palpated without an obvious mass present. No organomegaly. Bowel sounds normoactive.               "

## 2021-06-21 NOTE — LETTER
Letter by Phoebe Saha RDCS at      Author: Phoebe Saha RDCS Service: -- Author Type: --    Filed:  Encounter Date: 10/28/2020 Status: (Other)         Víctor Calderon  [address] [place] [address]      October 28, 2020      Dear Mr. Calderon,    RE: Remote Results    We are writing to you regarding your recent Remote ICD check from home. Your transmission was received successfully. Battery status is satisfactory at this time.     Your results are showing no significant changes.    Your next device appointment will be a remote check on January 27, 2021; this will occur automatically.    To schedule or reschedule, please call [phone] and press 1.    NOTE: If you would like to do an extra transmission, please call [phone] and press 3 to speak to a nurse BEFORE transmitting. This ensures that the Device Clinic staff is aware of the reason you are sending a transmission, and can follow-up with you after it has been reviewed.    We will be checking your implanted device from home (remotely) every three months unless otherwise instructed. We will need to see you in the clinic at least once a year. You may need to be seen in the clinic sooner depending on the results of your check.    Please be aware:    The follow-up schedule is like a Physician prescription.    Your remote monitor is paired to your specific implanted device.      Sincerely,    [facility]

## 2021-06-21 NOTE — LETTER
Letter by Jj Villanueva RN at      Author: Jj Villanueva RN Service: -- Author Type: --    Filed:  Encounter Date: 4/28/2021 Status: (Other)         Víctor ALLAN Calderon  [address]   [facility] [address]      April 28, 2021      Dear Nadeen Calderon,    RE: Remote Results    We are writing to you regarding your recent Remote ICD check from home. Your transmission was received successfully. Battery status is satisfactory at this time.     Your results are within normal limits.    Your next device appointment will be a remote check on 8/5/2021; this will occur automatically.    To schedule or reschedule, please call [phone] and press 1.    NOTE: If you would like to do an extra transmission, please call [phone] and press 3 to speak to a nurse BEFORE transmitting. This ensures that the Device Clinic staff is aware of the reason you are sending a transmission, and can follow-up with you after it has been reviewed.    We will be checking your implanted device from home (remotely) every three months unless otherwise instructed. We will need to see you in the clinic at least once a year. You may need to be seen in the clinic sooner depending on the results of your check.    Please be aware:    The follow-up schedule is like a Physician prescription.    Your remote monitor is paired to your specific implanted device.      Sincerely,    [facility]

## 2021-06-22 NOTE — PROGRESS NOTES
Assessment and Plan:       1. Encounter for general adult medical examination with abnormal findings  Annual wellness visit completed.  Hepatitis C screening based on age criteria.  Immunizations reviewed and otherwise up-to-date.  Annual wellness visits to continue.  Risks associated with lack of exercise as well as known history of hearing loss.  Needs to complete advanced directives.    - Hepatitis C Antibody (Anti-HCV)    2. Class 1 obesity due to excess calories with serious comorbidity and body mass index (BMI) of 31.0 to 31.9 in adult  Therapeutic lifestyle changes reviewed for weight goal less than 210 pounds initially, less than 200 pounds ideally.    3. Impaired fasting glucose  Check A1c today as well as fasting glucose with history of impaired fasting glucose.  Therapeutic lifestyle changes reviewed as noted above.  - Comprehensive Metabolic Panel  - Glycosylated Hemoglobin A1c    4. Essential hypertension with goal blood pressure less than 130/80  Ensure stable renal function while on lisinopril 10 mg using half tablet daily metoprolol succinate 150 mg daily.  Blood pressure 128/70.  - Comprehensive Metabolic Panel    5. Ischemic cardiomyopathy  History of ischemic cardiomyopathy with improvement to 51% on echocardiogram August 23, 2018 as noted below.  Will follow up with Dr. Sarmiento August 2019    6. Hypercholesterolemia  Check lipid cascade today.  Simvastatin 40 mg at bedtime.  - Lipid Cascade    7. Hypogonadism male  History of hypogonadism.    8. Gastroesophageal reflux disease without esophagitis  Using omeprazole 20 mg daily on as-needed basis only otherwise typically benefits with Tums.    9. Tubular adenoma of colon  H/O tubular adenoma of colon.  Prior colonoscopy September 30, 2015 told her repeat 5-year interval.  Patient feels that he may have had colonoscopy since this time however.  Will clarify.    10. ROMELIA on CPAP  Continue CPAP for ROMELIA management.    11. Male Erectile Disorder  History  of male erectile dysfunction, stable.    12. Bilateral hearing loss, unspecified hearing loss type  Patient has purchased hearing aids on Internet however not using consistently.        The patient's current medical problems were reviewed.    I have had an Advance Directives discussion with the patient.  The following high BMI interventions were performed this visit: encouragement to exercise, weight monitoring, weight loss from baseline weight and lifestyle education regarding diet.  Ensure ongoing efforts to achieve weight goal < 210 pounds initially, < 200 pounds ideally.     The following health maintenance schedule was reviewed with the patient and provided in printed form in the after visit summary:   Health Maintenance   Topic Date Due     ZOSTER VACCINES (2 of 3) 10/28/2008     TD 18+ HE  09/03/2019     FALL RISK ASSESSMENT  12/12/2019     COLONOSCOPY  09/30/2020     ADVANCE DIRECTIVES DISCUSSED WITH PATIENT  11/15/2022     PNEUMOCOCCAL POLYSACCHARIDE VACCINE AGE 65 AND OVER  Completed     INFLUENZA VACCINE RULE BASED  Completed     PNEUMOCOCCAL CONJUGATE VACCINE FOR ADULTS (PCV13 OR PREVNAR)  Completed        Subjective:     Chief Complaint: Víctor Calderon is an 73 y.o. male here for an Annual Wellness visit.     HPI: Mr. Flanagan is seen today for annual wellness visit.  In general doing well.  No recent illness.  History of ischemic cardiomyopathy.  Ejection fraction 51% on echocardiogram August 23, 2018.  Followed by Dr. Sarmiento on annual basis and will follow up in August 2019.  Continue simvastatin 40 mg at bedtime for lipid management.  Metoprolol succinate 150 mg and lisinopril 10 mg using half tablet daily for hypertension.  Infrequent reflux concerns currently for which he uses Tums tablets otherwise omeprazole with breakthrough symptoms.  History of hypogonadism with male erectile dysfunction.  Impaired fasting glucose history.  Not getting consistent exercise.  Comprehensive review of systems as  "above otherwise all.  Status post cardiac arrest likely bradycardia mediated torsades 2015.  Status post CRT-D device placement.  Doing well.  No changes today        Review of Systems:  Please see above.  The rest of the review of systems are negative for all systems.     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 left s/p T & A as child; dual chamber pacemaker placement on 2/9/15 after cardiac arrest.  Quit EtOH 02   ROMELIA on CPAP at 8 cm H2O  PHQ-9 score= (9/22/10)     Patient Care Team:  Adams Garcia MD as PCP - General     Patient Active Problem List   Diagnosis     Skin Neoplasm Of Uncertain Behavior     Hypercholesterolemia     Obstructive Sleep Apnea     Esophageal Reflux     Impaired Fasting Glucose     Changed Sexual Interest (Libido): Decreased     Cholelithiasis     Laryngeal Neoplasm Of The Vocal Cord     Hypertension     Dermatitis     Hoarseness     Male Erectile Disorder     Hypogonadism     Insomnia     Benign Tubular Adenoma Of The Large Intestine     Class 1 obesity due to excess calories with serious comorbidity and body mass index (BMI) of 31.0 to 31.9 in adult     Skin Tag (___ Mm)     Tachycardia     Bradycardia     Cardiac arrest (H)     Heart block AV complete (H)     Right ventricular dysfunction     CAD (coronary artery disease)     Normochromic normocytic anemia     NSVT (nonsustained ventricular tachycardia) (H)     Ischemic cardiomyopathy     Complete AV block, acquired (H)     Adenomatous colon polyp     Tubular adenoma of colon     ROMELIA on " "CPAP     ICD (implantable cardioverter-defibrillator), biventricular, in situ     Bilateral hearing loss     Erectile dysfunction, unspecified erectile dysfunction type     Past Medical History:   Diagnosis Date     Cholelithiasis      Coronary artery disease      Dermatitis      GERD (gastroesophageal reflux disease)      H/O cardiac arrest 2/2015    alka/torsades     Hyperlipidemia      Hypertension      Hypogonadism male      Left eye complaint     \"high pressure\"     Male erectile disorder      Obesity      Sleep apnea      Ventricular tachycardia (H)     seen on pacer interrogation      Past Surgical History:   Procedure Laterality Date     CARDIAC CATHETERIZATION       COLONOSCOPY N/A 9/30/2015    Procedure: COLONOSCOPY;  Surgeon: Rafiq Gu MD;  Location: [facility];  Service:      HERNIA REPAIR      inguinal      INSERT / REPLACE / REMOVE PACEMAKER       polyp  2010    removed during colonoscopy     NV REMOVAL OF SPERM DUCT(S)      Description: Surgery Of Male Genitalia Vasectomy;  Recorded: 02/22/2008;     NV REMOVE TONSILS/ADENOIDS,<13 Y/O      Description: Tonsillectomy With Adenoidectomy;  Recorded: 02/22/2008;     TUMOR REMOVAL  2008    right vocal cord -       Family History   Problem Relation Age of Onset     Pneumonia Mother      Hypertension Mother      Cancer Father      COPD Sister      Multiple sclerosis Sister      Acute Myocardial Infarction Neg Hx       Social History     Socioeconomic History     Marital status:      Spouse name: Not on file     Number of children: Not on file     Years of education: Not on file     Highest education level: Not on file   Social Needs     Financial resource strain: Not on file     Food insecurity - worry: Not on file     Food insecurity - inability: Not on file     Transportation needs - medical: Not on file     Transportation needs - non-medical: Not on file   Occupational History     Not on file   Tobacco Use     Smoking status: Former " "Smoker     Types: Cigarettes     Last attempt to quit: 6/1/2008     Years since quitting: 10.5     Smokeless tobacco: Never Used   Substance and Sexual Activity     Alcohol use: No     Drug use: No     Sexual activity: Not on file   Other Topics Concern     Not on file   Social History Narrative     Not on file      Current Outpatient Medications   Medication Sig Dispense Refill     aspirin 81 MG EC tablet Take 81 mg by mouth daily.       calcium carbonate (CALCIUM CARBONATE) 300 mg (750 mg) Chew Chew as needed.        coenzyme Q10 (CO Q-10) 200 mg capsule Take 200 mg by mouth 2 (two) times a day.       DIPHENHYDRAMINE HCL (BENADRYL ALLERGY ORAL) Take by mouth as needed.       lisinopril (PRINIVIL,ZESTRIL) 10 MG tablet TAKE ONE-HALF (1/2) TABLET DAILY EVERY MORNING 45 tablet 2     metoprolol succinate (TOPROL-XL) 100 MG 24 hr tablet TAKE 1 TABLET AT BEDTIME 90 tablet 2     metoprolol succinate (TOPROL-XL) 50 MG 24 hr tablet TAKE 1 TABLET PLUS 1 TABLET  MG DAILY FOR TOTAL DOSE  MG 90 tablet 2     NITROSTAT 0.4 mg SL tablet Place 0.4 mg under the tongue every 5 (five) minutes as needed.        omeprazole (PRILOSEC) 20 MG capsule Take 1 capsule (20 mg total) by mouth as needed. 90 capsule 3     psyllium with dextrose (METAMUCIL JAR) powder Take by mouth Daily after lunch.       sildenafil (VIAGRA) 100 MG tablet Take 0.5 tablets (50 mg total) by mouth daily as needed for erectile dysfunction. 30 tablet 5     simvastatin (ZOCOR) 40 MG tablet TAKE 1 TABLET AT BEDTIME 90 tablet 2     traMADol (ULTRAM) 50 mg tablet Take 1-2 tablets ( mg total) by mouth every 6 (six) hours as needed for pain. Take 1-2 tablets every 6 hours as needed for pain 40 tablet 1     traZODone (DESYREL) 100 MG tablet Take 0.5 tablets (50 mg total) by mouth at bedtime. 45 tablet 3     No current facility-administered medications for this visit.       Objective:   Vital Signs:   Visit Vitals  /70   Pulse 62   Ht 5' 10\" (1.778 m) "   Wt 222 lb (100.7 kg)   SpO2 99%   BMI 31.85 kg/m         VisionScreening:  No exam data present     PHYSICAL EXAM    General Appearance:    Alert, cooperative, no distress, appears stated age.  Obesity.   Head:    Normocephalic, without obvious abnormality, atraumatic   Eyes:    PERRL, conjunctiva/corneas clear, EOM's intact, fundi     benign, both eyes  Glasses.        Ears:    Normal TM's and external ear canals, both ears   Nose:   Nares normal, septum midline, mucosa normal, no drainage    or sinus tenderness   Throat:   Lips, mucosa, and tongue normal; teeth and gums normal   Neck:   Supple, symmetrical, trachea midline, no adenopathy;        thyroid:  No enlargement/tenderness/nodules; no carotid    bruit or JVD   Back:     Symmetric, no curvature, ROM normal, no CVA tenderness   Lungs:     Clear to auscultation bilaterally, respirations unlabored   Chest wall:    No tenderness or deformity   Heart:    Regular rate and rhythm, S1 and S2 normal, no murmur, rub   or gallop   Abdomen:     Soft, non-tender, bowel sounds active all four quadrants,     no masses, no organomegaly.     Genitalia:    Normal male without lesion, discharge or tenderness.  No inguinal hernia noted.     Rectal:    Normal tone.  Prostate normal/symmetric, no masses or tenderness.   Extremities:   Extremities normal, atraumatic, no cyanosis or edema   Pulses:   2+ and symmetric all extremities   Skin:   Skin color, texture, turgor normal, no rashes or lesions   Lymph nodes:   Cervical, supraclavicular, and axillary nodes normal   Neurologic:   CNII-XII intact. Normal strength, sensation and reflexes       throughout        Assessment Results 12/12/2018   Activities of Daily Living No help needed   Instrumental Activities of Daily Living No help needed   Get Up and Go Score Less than 12 seconds   Mini Cog Total Score 5   Some recent data might be hidden     A Mini-Cog score of 0-2 suggests the possibility of dementia, score of 3-5 suggests  no dementia    Identified Health Risks:     He is at risk for lack of exercise and has been provided with information to increase physical activity for the benefit of his well-being.  The patient was provided with written information regarding signs of hearing loss.  Information regarding advance directives (living zafar), including where he can download the appropriate form, was provided to the patient via the AVS.       Echo 8/23/18 Summary       Left ventricle ejection fraction is mildly decreased. The calculated left ventricular ejection fraction is 51%.    Normal left ventricular size.    Normal right ventricular size and systolic function.    Aortic Valve: The aortic valve is sclerotic without reduced excursion. Mild aortic regurgitation.    No hemodynamically significant valvular heart abnormalities.    When compared to the previous study dated 1/4/2016, no significant change.

## 2021-06-25 NOTE — TELEPHONE ENCOUNTER
"Who is calling:  Patient  Reason for Call:  Patient is calling back about his message and discuss his next steps after his \" gall bladder attack\".   Date of last appointment with primary care: 12/12/18  Okay to leave a detailed message: Yes [phone]      "

## 2021-06-25 NOTE — TELEPHONE ENCOUNTER
Patient saw [facility] in 2015  Order pended for approval if appropriate - please associate a diagnosis

## 2021-06-25 NOTE — TELEPHONE ENCOUNTER
Referral Request  Type of referral: ENT  Who s requesting: Patient  Why the request: Patient has noticed increased hoarseness over the past few months. Patient would like to see the same ENT he saw for cancer of the vocal chords, a few years ago.  Have you been seen for this request: Yes  Does patient have a preference on a group/provider? Patient was not sure who he saw previously, [facility]?  Okay to leave a detailed message?  Yes [phone]

## 2021-06-26 NOTE — PROGRESS NOTES
Assessment/Plan:    1. Benign essential hypertension  Hypertension well controlled.  Metoprolol succinate 150 mg daily and lisinopril 5 mg daily continues.  History of likely bradycardia mediated torsades with cardiac arrest historically with dual-chamber pacemaker placement February 9, 2015 noted.  We will schedule follow-up with Dr. Frost's office and has not been seen since September 16, 2019.  - Basic Metabolic Panel    2. Impaired fasting glucose  Impaired fasting glucose history.  Fasting glucose obtained today.  Has achieved 18 pound weight loss since annual wellness visit December 15, 2020.  Continue weight goal less than 220 pounds initially, less than 210 pounds ideally.  - Basic Metabolic Panel    3. Hyperlipidemia LDL goal <100  Simvastatin 40 mg at bedtime continues.  Check lipid cascade today while fasting.  - Lipid Cascade    4. Ischemic cardiomyopathy  Prior echocardiogram as noted below with EF 51% and will defer to Dr. Frost regarding updating echocardiogram.  Asymptomatic currently.    5. Epistaxis  Epistaxis with Flonase use.  Will discontinue Flonase currently and use cetirizine 10 mg daily for allergy management.  Saline nasal spray etc. to be utilized.  - HM2(CBC w/o Differential)    6. Allergic rhinitis, unspecified seasonality, unspecified trigger  As above, avoid fluticasone nasal spray due to epistaxis concerns recently from left nasopharynx and utilize once daily nonsedating antihistamine as tolerated.    7. Benign neoplasm of colon, unspecified part of colon  Patient has apparent upcoming colonoscopy in August 2021 with personal history of colon polyp historically noted with most recent colonoscopy September 2015.      The following high BMI interventions were performed this visit: encouragement to exercise, weight monitoring, weight loss from baseline weight and lifestyle education regarding diet .  Ensure ongoing efforts to achieve weight goal < 220 pounds initially, < 210 pounds  "ideally.         Subjective:    Víctor Calderon is seen today for follow-up assessment.  History of hypertension treated with metoprolol succinate 150 mg daily and lisinopril 5 mg daily.  No side effects.  Impaired fasting glucose previously.  Simvastatin 40 mg at bedtime for lipid management.  Ischemic cardiomyopathy described with EF 51% in 2018.  Has had epistaxis concerns recently.  Uses fluticasone nasal spray for nasal congestion prevention while on CPAP etc. for obstructive sleep apnea.  Describe history of allergic rhinitis.  Cardiac arrest secondary to bradycardia mediated torsades likely and does have CRT-D device in place.  Had been seen by Dr. Sarmiento 2019 with recommendation for 1 year follow-up however has not been back likely secondary to pandemic.  Has achieved 18 pound weight loss through dietary and exercise modifications however not checking his weight on a regular basis outside of office.  Comprehensive review of systems as above otherwise all negative.       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 left s/p T & A as child; dual chamber pacemaker placement on 2/9/15 after cardiac arrest.   Quit EtOH 02   ROMELIA on CPAP at 8 cm H2O   PHQ-9 score= (9/22/10)     Past Surgical History:   Procedure Laterality Date     CARDIAC CATHETERIZATION       COLONOSCOPY N/A 2015    Procedure: COLONOSCOPY;  Surgeon: Rafiq Gu MD;  Location: [facility];  Service:      HERNIA REPAIR      inguinal      INSERT / REPLACE / REMOVE PACEMAKER       " LAPAROSCOPIC CHOLECYSTECTOMY N/A 10/2/2019    Procedure: CHOLECYSTECTOMY, LAPAROSCOPIC;  Surgeon: Shin Baird MD;  [place] [facility] [place]  Service: General     polyp  2010    removed during colonoscopy     OR ERCP DX COLLECTION SPECIMEN BRUSHING/WASHING N/A 5/10/2020    Procedure: ENDOSCOPIC RETROGRADE CHOLANGIOPANCREATOGRAPHY;  Surgeon: Simon Loaiza MD;  [place] [facility] [place];  Service: Gastroenterology     OR REMOVAL OF SPERM DUCT(S)      Description: Surgery Of Male Genitalia Vasectomy;  Recorded: 2008;     OR REMOVE TONSILS/ADENOIDS,<11 Y/O      Description: Tonsillectomy With Adenoidectomy;  Recorded: 2008;     TONSILLECTOMY       TUMOR REMOVAL      right vocal cord -      XR ERCP BILIARY ONLY  5/10/2020        Family History   Problem Relation Age of Onset     Pneumonia Mother      Hypertension Mother      Cancer Father      COPD Sister      Multiple sclerosis Sister      Acute Myocardial Infarction Neg Hx         Past Medical History:   Diagnosis Date     Cholelithiasis      Coronary artery disease      Dermatitis      GERD (gastroesophageal reflux disease)      H/O cardiac arrest 2015    alka/torsades     Hyperlipidemia      Hypertension      Hypogonadism male      Male erectile disorder      Obesity      Sleep apnea     CPAP     Ventricular tachycardia (H)     seen on pacer interrogation        Social History     Tobacco Use     Smoking status: Former Smoker     Packs/day: 0.00     Types: Cigarettes     Quit date: 2008     Years since quittin.0     Smokeless tobacco: Never Used   Substance Use Topics     Alcohol use: No     Drug use: No        Current Outpatient Medications   Medication Sig Dispense Refill     aspirin 81 MG EC tablet Take 81 mg by mouth daily.       calcium carbonate (CALCIUM CARBONATE) 300 mg (750 mg) Chew Chew 2-3 tablets as needed (heartburn).              coenzyme Q10 (CO Q-10) 200 mg capsule Take 200 mg by mouth daily.         DIPHENHYDRAMINE HCL (BENADRYL ALLERGY ORAL) Take 25-50 mg by mouth every 6 (six) hours as needed (allergies).              fish oil-omega-3 fatty acids (FISH OIL) 300-1,000 mg capsule Take 1 g by mouth daily.       fluticasone propionate (FLONASE) 50 mcg/actuation nasal spray USE 2 SPRAYS IN EACH NOSTRIL DAILY 48 g 2     latanoprost (XALATAN) 0.005 % ophthalmic solution Administer 1 drop to both eyes at bedtime.              lisinopriL (PRINIVIL,ZESTRIL) 5 MG tablet Take 1 tablet (5 mg total) by mouth daily. 90 tablet 3     metoprolol succinate (TOPROL-XL) 100 MG 24 hr tablet TAKE 1 TABLET AT BEDTIME 90 tablet 3     metoprolol succinate (TOPROL-XL) 50 MG 24 hr tablet TAKE 1 TABLET PLUS 1 TABLET  MG DAILY FOR TOTAL DOSE  MG 90 tablet 3     multivitamin therapeutic tablet Take 1 tablet by mouth daily.       nitroglycerin (NITROSTAT) 0.4 MG SL tablet Place 1 tablet (0.4 mg total) under the tongue every 5 (five) minutes as needed for chest pain. 100 tablet 3     omeprazole (PRILOSEC) 20 MG capsule TAKE 1 CAPSULE AS NEEDED 90 capsule 2     oxymetazoline (AFRIN) 0.05 % nasal spray Apply 2 sprays into each nostril daily as needed for congestion.       psyllium (METAMUCIL) 3.4 gram packet Take 1 packet by mouth 2 (two) times a day.       sildenafiL (VIAGRA) 50 MG tablet Take 1 tablet (50 mg total) by mouth as needed. 30 tablet 11     simvastatin (ZOCOR) 40 MG tablet TAKE 1 TABLET AT BEDTIME 90 tablet 3     tadalafiL (CIALIS) 10 MG tablet Take 1 tablet (10 mg total) by mouth daily as needed for erectile dysfunction. 30 tablet 11     traMADol (ULTRAM) 50 mg tablet Take 1-2 tablets ( mg total) by mouth every 6 (six) hours as needed for pain. Take 1-2 tablets every 6 hours as needed for pain 15 tablet 1     traZODone (DESYREL) 50 MG tablet TAKE 1 TABLET AT BEDTIME 90 tablet 1     No current facility-administered medications for this visit.           Objective:    Vitals:    06/15/21 1109   BP: 130/70   Pulse:  69   SpO2: 98%   Weight: (!) 231 lb (104.8 kg)      Body mass index is 32.68 kg/m .    Alert.  No apparent distress.  Cardiac exam regular.  Chest clear.  Nasopharynx with recent epistaxis left nasopharynx noted with nasal septal irritation mild compared to right side.  No active bleeding.      Echo 8/23/21 Summary    Left ventricle ejection fraction is mildly decreased. The calculated left ventricular ejection fraction is 51%.    Normal left ventricular size.    Normal right ventricular size and systolic function.    Aortic Valve: The aortic valve is sclerotic without reduced excursion. Mild aortic regurgitation.    No hemodynamically significant valvular heart abnormalities.    When compared to the previous study dated 1/4/2016, no significant change.           This note has been dictated using voice recognition software and as a result may contain minor grammatical errors and unintended word substitutions.

## 2021-06-29 DIAGNOSIS — Z11.59 ENCOUNTER FOR SCREENING FOR OTHER VIRAL DISEASES: ICD-10-CM

## 2021-06-30 DIAGNOSIS — Z11.59 ENCOUNTER FOR SCREENING FOR OTHER VIRAL DISEASES: ICD-10-CM

## 2021-07-03 NOTE — ADDENDUM NOTE
Addendum Note by Paris Whitley CMA at 10/1/2018  1:22 PM     Author: Paris Whitley CMA Service: -- Author Type: Certified Medical Assistant    Filed: 10/1/2018  1:22 PM Encounter Date: 10/1/2018 Status: Signed    : Paris Whitley CMA (Certified Medical Assistant)    Addended by: PARIS WHITLEY on: 10/1/2018 01:22 PM        Modules accepted: Orders

## 2021-07-06 VITALS
OXYGEN SATURATION: 98 % | BODY MASS INDEX: 32.68 KG/M2 | WEIGHT: 231 LBS | HEART RATE: 69 BPM | DIASTOLIC BLOOD PRESSURE: 70 MMHG | SYSTOLIC BLOOD PRESSURE: 130 MMHG

## 2021-07-10 ENCOUNTER — COMMUNICATION - HEALTHEAST (OUTPATIENT)
Dept: FAMILY MEDICINE | Facility: CLINIC | Age: 76
End: 2021-07-10

## 2021-07-10 ENCOUNTER — COMMUNICATION - HEALTHEAST (OUTPATIENT)
Dept: CARDIOLOGY | Facility: CLINIC | Age: 76
End: 2021-07-10

## 2021-07-10 DIAGNOSIS — E78.5 HYPERLIPIDEMIA: ICD-10-CM

## 2021-07-10 DIAGNOSIS — K21.9 GERD (GASTROESOPHAGEAL REFLUX DISEASE): ICD-10-CM

## 2021-07-10 NOTE — TELEPHONE ENCOUNTER
Telephone Encounter by Simon Graff RN at 7/10/2021  3:45 PM     Author: Simon Graff, RN Service: -- Author Type: Registered Nurse    Filed: 7/10/2021  3:46 PM Encounter Date: 7/10/2021 Status: Signed    : Simon Graff, RN (Registered Nurse)       Refill Approved    Rx renewed per Medication Renewal Policy. Medication was last renewed on 10/4/20.    Simon Graff, [facility] Connection Triage/Med Refill 7/10/2021     Requested Prescriptions   Pending Prescriptions Disp Refills   ? omeprazole (PRILOSEC) 20 MG capsule [Pharmacy Med Name: OMEPRAZOLE DR CAPS 20MG] 90 capsule 3     Sig: TAKE 1 CAPSULE AS NEEDED       GI Medications Refill Protocol Passed - 7/10/2021 12:00 PM        Passed - PCP or prescribing provider visit in last 12 or next 3 months.     Last office visit with prescriber/PCP: 6/15/2021 Adams Garcia MD OR same dept: 6/15/2021 Adams Garcia MD OR same specialty: 6/15/2021 Adams Garcia MD  Last physical: 12/15/2020 Last MTM visit: Visit date not found   Next visit within 3 mo: Visit date not found  Next physical within 3 mo: Visit date not found  Prescriber OR PCP: Adams Garcia MD  Last diagnosis associated with med order: 1. GERD (gastroesophageal reflux disease)  - omeprazole (PRILOSEC) 20 MG capsule [Pharmacy Med Name: OMEPRAZOLE DR CAPS 20MG]; TAKE 1 CAPSULE AS NEEDED  Dispense: 90 capsule; Refill: 3    If protocol passes may refill for 12 months if within 3 months of last provider visit (or a total of 15 months).

## 2021-07-19 DIAGNOSIS — E78.5 HYPERLIPIDEMIA LDL GOAL <70: ICD-10-CM

## 2021-07-19 RX ORDER — SIMVASTATIN 40 MG
40 TABLET ORAL AT BEDTIME
Qty: 90 TABLET | Refills: 3 | Status: SHIPPED | OUTPATIENT
Start: 2021-07-19 | End: 2022-07-11

## 2021-08-05 ENCOUNTER — ANCILLARY PROCEDURE (OUTPATIENT)
Dept: CARDIOLOGY | Facility: CLINIC | Age: 76
End: 2021-08-05
Attending: INTERNAL MEDICINE
Payer: COMMERCIAL

## 2021-08-05 DIAGNOSIS — Z95.810 BIVENTRICULAR IMPLANTABLE CARDIOVERTER-DEFIBRILLATOR (ICD) IN SITU: ICD-10-CM

## 2021-08-05 LAB
MDC_IDC_EPISODE_DTM: NORMAL
MDC_IDC_EPISODE_DTM: NORMAL
MDC_IDC_EPISODE_ID: NORMAL
MDC_IDC_EPISODE_ID: NORMAL
MDC_IDC_EPISODE_TYPE: NORMAL
MDC_IDC_EPISODE_TYPE: NORMAL
MDC_IDC_LEAD_IMPLANT_DT: NORMAL
MDC_IDC_LEAD_LOCATION: NORMAL
MDC_IDC_LEAD_LOCATION_DETAIL_1: NORMAL
MDC_IDC_LEAD_MFG: NORMAL
MDC_IDC_LEAD_MODEL: NORMAL
MDC_IDC_LEAD_POLARITY_TYPE: NORMAL
MDC_IDC_LEAD_SERIAL: NORMAL
MDC_IDC_LEAD_SPECIAL_FUNCTION: NORMAL
MDC_IDC_MSMT_BATTERY_DTM: NORMAL
MDC_IDC_MSMT_BATTERY_REMAINING_LONGEVITY: 60 MO
MDC_IDC_MSMT_BATTERY_REMAINING_PERCENTAGE: 80 %
MDC_IDC_MSMT_BATTERY_STATUS: NORMAL
MDC_IDC_MSMT_CAP_CHARGE_DTM: NORMAL
MDC_IDC_MSMT_CAP_CHARGE_DTM: NORMAL
MDC_IDC_MSMT_CAP_CHARGE_ENERGY: 41 J
MDC_IDC_MSMT_CAP_CHARGE_TIME: 11.5 S
MDC_IDC_MSMT_CAP_CHARGE_TIME: 8.8 S
MDC_IDC_MSMT_CAP_CHARGE_TYPE: NORMAL
MDC_IDC_MSMT_CAP_CHARGE_TYPE: NORMAL
MDC_IDC_MSMT_LEADCHNL_LV_IMPEDANCE_VALUE: 691 OHM
MDC_IDC_MSMT_LEADCHNL_LV_IMPEDANCE_VALUE: 691 OHM
MDC_IDC_MSMT_LEADCHNL_LV_PACING_THRESHOLD_AMPLITUDE: 0.9 V
MDC_IDC_MSMT_LEADCHNL_LV_PACING_THRESHOLD_PULSEWIDTH: 0.5 MS
MDC_IDC_MSMT_LEADCHNL_RA_IMPEDANCE_VALUE: 384 OHM
MDC_IDC_MSMT_LEADCHNL_RA_PACING_THRESHOLD_AMPLITUDE: 0.6 V
MDC_IDC_MSMT_LEADCHNL_RA_PACING_THRESHOLD_PULSEWIDTH: 0.5 MS
MDC_IDC_MSMT_LEADCHNL_RV_IMPEDANCE_VALUE: 373 OHM
MDC_IDC_MSMT_LEADCHNL_RV_PACING_THRESHOLD_AMPLITUDE: 1 V
MDC_IDC_MSMT_LEADCHNL_RV_PACING_THRESHOLD_PULSEWIDTH: 0.5 MS
MDC_IDC_PG_IMPLANT_DTM: NORMAL
MDC_IDC_PG_MFG: NORMAL
MDC_IDC_PG_MODEL: NORMAL
MDC_IDC_PG_SERIAL: NORMAL
MDC_IDC_PG_TYPE: NORMAL
MDC_IDC_SESS_CLINIC_NAME: NORMAL
MDC_IDC_SESS_DTM: NORMAL
MDC_IDC_SESS_TYPE: NORMAL
MDC_IDC_SET_BRADY_AT_MODE_SWITCH_MODE: NORMAL
MDC_IDC_SET_BRADY_AT_MODE_SWITCH_RATE: 170 {BEATS}/MIN
MDC_IDC_SET_BRADY_LOWRATE: 60 {BEATS}/MIN
MDC_IDC_SET_BRADY_MAX_SENSOR_RATE: 130 {BEATS}/MIN
MDC_IDC_SET_BRADY_MAX_TRACKING_RATE: 140 {BEATS}/MIN
MDC_IDC_SET_BRADY_MODE: NORMAL
MDC_IDC_SET_BRADY_PAV_DELAY_LOW: 180 MS
MDC_IDC_SET_BRADY_SAV_DELAY_LOW: 150 MS
MDC_IDC_SET_CRT_LVRV_DELAY: 0 MS
MDC_IDC_SET_CRT_PACED_CHAMBERS: NORMAL
MDC_IDC_SET_LEADCHNL_LV_PACING_AMPLITUDE: 2 V
MDC_IDC_SET_LEADCHNL_LV_PACING_ANODE_ELECTRODE_1: NORMAL
MDC_IDC_SET_LEADCHNL_LV_PACING_ANODE_LOCATION_1: NORMAL
MDC_IDC_SET_LEADCHNL_LV_PACING_CATHODE_ELECTRODE_1: NORMAL
MDC_IDC_SET_LEADCHNL_LV_PACING_CATHODE_LOCATION_1: NORMAL
MDC_IDC_SET_LEADCHNL_LV_PACING_PULSEWIDTH: 0.5 MS
MDC_IDC_SET_LEADCHNL_LV_SENSING_ADAPTATION_MODE: NORMAL
MDC_IDC_SET_LEADCHNL_LV_SENSING_ANODE_ELECTRODE_1: NORMAL
MDC_IDC_SET_LEADCHNL_LV_SENSING_ANODE_LOCATION_1: NORMAL
MDC_IDC_SET_LEADCHNL_LV_SENSING_CATHODE_ELECTRODE_1: NORMAL
MDC_IDC_SET_LEADCHNL_LV_SENSING_CATHODE_LOCATION_1: NORMAL
MDC_IDC_SET_LEADCHNL_LV_SENSING_SENSITIVITY: 1 MV
MDC_IDC_SET_LEADCHNL_RA_PACING_AMPLITUDE: 1.2 V
MDC_IDC_SET_LEADCHNL_RA_PACING_POLARITY: NORMAL
MDC_IDC_SET_LEADCHNL_RA_PACING_PULSEWIDTH: 0.5 MS
MDC_IDC_SET_LEADCHNL_RA_SENSING_ADAPTATION_MODE: NORMAL
MDC_IDC_SET_LEADCHNL_RA_SENSING_POLARITY: NORMAL
MDC_IDC_SET_LEADCHNL_RA_SENSING_SENSITIVITY: 0.25 MV
MDC_IDC_SET_LEADCHNL_RV_PACING_AMPLITUDE: 2 V
MDC_IDC_SET_LEADCHNL_RV_PACING_POLARITY: NORMAL
MDC_IDC_SET_LEADCHNL_RV_PACING_PULSEWIDTH: 0.5 MS
MDC_IDC_SET_LEADCHNL_RV_SENSING_ADAPTATION_MODE: NORMAL
MDC_IDC_SET_LEADCHNL_RV_SENSING_POLARITY: NORMAL
MDC_IDC_SET_LEADCHNL_RV_SENSING_SENSITIVITY: 0.6 MV
MDC_IDC_SET_ZONE_DETECTION_INTERVAL: 300 MS
MDC_IDC_SET_ZONE_DETECTION_INTERVAL: 353 MS
MDC_IDC_SET_ZONE_TYPE: NORMAL
MDC_IDC_SET_ZONE_TYPE: NORMAL
MDC_IDC_SET_ZONE_VENDOR_TYPE: NORMAL
MDC_IDC_SET_ZONE_VENDOR_TYPE: NORMAL
MDC_IDC_STAT_AT_BURDEN_PERCENT: 0 %
MDC_IDC_STAT_AT_DTM_END: NORMAL
MDC_IDC_STAT_AT_DTM_START: NORMAL
MDC_IDC_STAT_BRADY_DTM_END: NORMAL
MDC_IDC_STAT_BRADY_DTM_START: NORMAL
MDC_IDC_STAT_BRADY_RA_PERCENT_PACED: 11 %
MDC_IDC_STAT_BRADY_RV_PERCENT_PACED: 96 %
MDC_IDC_STAT_CRT_DTM_END: NORMAL
MDC_IDC_STAT_CRT_DTM_START: NORMAL
MDC_IDC_STAT_CRT_LV_PERCENT_PACED: 99 %
MDC_IDC_STAT_EPISODE_RECENT_COUNT: 0
MDC_IDC_STAT_EPISODE_RECENT_COUNT: 1
MDC_IDC_STAT_EPISODE_RECENT_COUNT: 5
MDC_IDC_STAT_EPISODE_RECENT_COUNT_DTM_END: NORMAL
MDC_IDC_STAT_EPISODE_RECENT_COUNT_DTM_START: NORMAL
MDC_IDC_STAT_EPISODE_TYPE: NORMAL
MDC_IDC_STAT_EPISODE_VENDOR_TYPE: NORMAL
MDC_IDC_STAT_TACHYTHERAPY_ATP_DELIVERED_RECENT: 0
MDC_IDC_STAT_TACHYTHERAPY_ATP_DELIVERED_TOTAL: 0
MDC_IDC_STAT_TACHYTHERAPY_RECENT_DTM_END: NORMAL
MDC_IDC_STAT_TACHYTHERAPY_RECENT_DTM_START: NORMAL
MDC_IDC_STAT_TACHYTHERAPY_SHOCKS_ABORTED_RECENT: 0
MDC_IDC_STAT_TACHYTHERAPY_SHOCKS_ABORTED_TOTAL: 0
MDC_IDC_STAT_TACHYTHERAPY_SHOCKS_DELIVERED_RECENT: 0
MDC_IDC_STAT_TACHYTHERAPY_SHOCKS_DELIVERED_TOTAL: 2
MDC_IDC_STAT_TACHYTHERAPY_TOTAL_DTM_END: NORMAL
MDC_IDC_STAT_TACHYTHERAPY_TOTAL_DTM_START: NORMAL

## 2021-08-05 PROCEDURE — 93296 REM INTERROG EVL PM/IDS: CPT | Performed by: INTERNAL MEDICINE

## 2021-08-05 PROCEDURE — 93295 DEV INTERROG REMOTE 1/2/MLT: CPT | Performed by: INTERNAL MEDICINE

## 2021-09-29 DIAGNOSIS — G47.00 INSOMNIA, UNSPECIFIED TYPE: Primary | ICD-10-CM

## 2021-09-29 DIAGNOSIS — G47.00 INSOMNIA, UNSPECIFIED: ICD-10-CM

## 2021-09-29 RX ORDER — TRAZODONE HYDROCHLORIDE 50 MG/1
TABLET, FILM COATED ORAL
Qty: 90 TABLET | Refills: 1 | Status: SHIPPED | OUTPATIENT
Start: 2021-09-29 | End: 2022-02-17

## 2021-09-29 NOTE — TELEPHONE ENCOUNTER
Refill Request: Trazodone 50MG tabs    Last OV: 6/15/2021    Pending Prescriptions:                       Disp   Refills    traZODone (DESYREL) 50 MG tablet          90 tab*1

## 2021-10-11 ENCOUNTER — HEALTH MAINTENANCE LETTER (OUTPATIENT)
Age: 76
End: 2021-10-11

## 2021-10-13 ENCOUNTER — IMMUNIZATION (OUTPATIENT)
Dept: NURSING | Facility: CLINIC | Age: 76
End: 2021-10-13
Payer: COMMERCIAL

## 2021-10-13 PROCEDURE — G0008 ADMIN INFLUENZA VIRUS VAC: HCPCS

## 2021-10-13 PROCEDURE — 91300 PR COVID VAC PFIZER DIL RECON 30 MCG/0.3 ML IM: CPT

## 2021-10-13 PROCEDURE — 0004A PR COVID VAC PFIZER DIL RECON 30 MCG/0.3 ML IM: CPT

## 2021-10-13 PROCEDURE — 90662 IIV NO PRSV INCREASED AG IM: CPT

## 2021-11-05 ENCOUNTER — ANCILLARY PROCEDURE (OUTPATIENT)
Dept: CARDIOLOGY | Facility: CLINIC | Age: 76
End: 2021-11-05
Attending: INTERNAL MEDICINE
Payer: COMMERCIAL

## 2021-11-05 DIAGNOSIS — Z95.810 ICD (IMPLANTABLE CARDIOVERTER-DEFIBRILLATOR) IN PLACE: ICD-10-CM

## 2021-11-05 DIAGNOSIS — Z95.810 PRESENCE OF CARDIAC RESYNCHRONIZATION THERAPY DEFIBRILLATOR (CRT-D): ICD-10-CM

## 2021-11-05 DIAGNOSIS — I25.5 ISCHEMIC CARDIOMYOPATHY: Primary | ICD-10-CM

## 2021-11-05 DIAGNOSIS — I44.2 COMPLETE AV BLOCK, ACQUIRED (H): ICD-10-CM

## 2021-11-05 PROCEDURE — 93284 PRGRMG EVAL IMPLANTABLE DFB: CPT | Performed by: INTERNAL MEDICINE

## 2021-11-07 LAB
MDC_IDC_LEAD_IMPLANT_DT: NORMAL
MDC_IDC_LEAD_LOCATION: NORMAL
MDC_IDC_LEAD_LOCATION_DETAIL_1: NORMAL
MDC_IDC_LEAD_MFG: NORMAL
MDC_IDC_LEAD_MODEL: NORMAL
MDC_IDC_LEAD_POLARITY_TYPE: NORMAL
MDC_IDC_LEAD_SERIAL: NORMAL
MDC_IDC_LEAD_SPECIAL_FUNCTION: NORMAL
MDC_IDC_MSMT_BATTERY_DTM: NORMAL
MDC_IDC_MSMT_BATTERY_REMAINING_LONGEVITY: 54 MO
MDC_IDC_MSMT_BATTERY_REMAINING_PERCENTAGE: 76 %
MDC_IDC_MSMT_BATTERY_STATUS: NORMAL
MDC_IDC_MSMT_CAP_CHARGE_DTM: NORMAL
MDC_IDC_MSMT_CAP_CHARGE_DTM: NORMAL
MDC_IDC_MSMT_CAP_CHARGE_ENERGY: 41 J
MDC_IDC_MSMT_CAP_CHARGE_TIME: 11.6 S
MDC_IDC_MSMT_CAP_CHARGE_TIME: 8.8 S
MDC_IDC_MSMT_CAP_CHARGE_TYPE: NORMAL
MDC_IDC_MSMT_CAP_CHARGE_TYPE: NORMAL
MDC_IDC_MSMT_LEADCHNL_LV_IMPEDANCE_VALUE: 760 OHM
MDC_IDC_MSMT_LEADCHNL_LV_PACING_THRESHOLD_AMPLITUDE: 1 V
MDC_IDC_MSMT_LEADCHNL_LV_PACING_THRESHOLD_PULSEWIDTH: 0.5 MS
MDC_IDC_MSMT_LEADCHNL_RA_IMPEDANCE_VALUE: 399 OHM
MDC_IDC_MSMT_LEADCHNL_RA_PACING_THRESHOLD_AMPLITUDE: 0.6 V
MDC_IDC_MSMT_LEADCHNL_RA_PACING_THRESHOLD_PULSEWIDTH: 0.5 MS
MDC_IDC_MSMT_LEADCHNL_RV_IMPEDANCE_VALUE: 380 OHM
MDC_IDC_MSMT_LEADCHNL_RV_PACING_THRESHOLD_AMPLITUDE: 1.1 V
MDC_IDC_MSMT_LEADCHNL_RV_PACING_THRESHOLD_PULSEWIDTH: 0.5 MS
MDC_IDC_PG_IMPLANT_DTM: NORMAL
MDC_IDC_PG_MFG: NORMAL
MDC_IDC_PG_MODEL: NORMAL
MDC_IDC_PG_SERIAL: NORMAL
MDC_IDC_PG_TYPE: NORMAL
MDC_IDC_SESS_CLINIC_NAME: NORMAL
MDC_IDC_SESS_DTM: NORMAL
MDC_IDC_SESS_TYPE: NORMAL
MDC_IDC_SET_BRADY_AT_MODE_SWITCH_MODE: NORMAL
MDC_IDC_SET_BRADY_AT_MODE_SWITCH_RATE: 170 {BEATS}/MIN
MDC_IDC_SET_BRADY_LOWRATE: 60 {BEATS}/MIN
MDC_IDC_SET_BRADY_MAX_SENSOR_RATE: 130 {BEATS}/MIN
MDC_IDC_SET_BRADY_MAX_TRACKING_RATE: 140 {BEATS}/MIN
MDC_IDC_SET_BRADY_MODE: NORMAL
MDC_IDC_SET_BRADY_PAV_DELAY_LOW: 180 MS
MDC_IDC_SET_BRADY_SAV_DELAY_LOW: 150 MS
MDC_IDC_SET_CRT_LVRV_DELAY: 0 MS
MDC_IDC_SET_CRT_PACED_CHAMBERS: NORMAL
MDC_IDC_SET_LEADCHNL_LV_PACING_AMPLITUDE: 2 V
MDC_IDC_SET_LEADCHNL_LV_PACING_ANODE_ELECTRODE_1: NORMAL
MDC_IDC_SET_LEADCHNL_LV_PACING_ANODE_LOCATION_1: NORMAL
MDC_IDC_SET_LEADCHNL_LV_PACING_CATHODE_ELECTRODE_1: NORMAL
MDC_IDC_SET_LEADCHNL_LV_PACING_CATHODE_LOCATION_1: NORMAL
MDC_IDC_SET_LEADCHNL_LV_PACING_PULSEWIDTH: 0.5 MS
MDC_IDC_SET_LEADCHNL_LV_SENSING_ADAPTATION_MODE: NORMAL
MDC_IDC_SET_LEADCHNL_LV_SENSING_ANODE_ELECTRODE_1: NORMAL
MDC_IDC_SET_LEADCHNL_LV_SENSING_ANODE_LOCATION_1: NORMAL
MDC_IDC_SET_LEADCHNL_LV_SENSING_CATHODE_ELECTRODE_1: NORMAL
MDC_IDC_SET_LEADCHNL_LV_SENSING_CATHODE_LOCATION_1: NORMAL
MDC_IDC_SET_LEADCHNL_LV_SENSING_SENSITIVITY: 1 MV
MDC_IDC_SET_LEADCHNL_RA_PACING_AMPLITUDE: 1.2 V
MDC_IDC_SET_LEADCHNL_RA_PACING_POLARITY: NORMAL
MDC_IDC_SET_LEADCHNL_RA_PACING_PULSEWIDTH: 0.5 MS
MDC_IDC_SET_LEADCHNL_RA_SENSING_ADAPTATION_MODE: NORMAL
MDC_IDC_SET_LEADCHNL_RA_SENSING_POLARITY: NORMAL
MDC_IDC_SET_LEADCHNL_RA_SENSING_SENSITIVITY: 0.25 MV
MDC_IDC_SET_LEADCHNL_RV_PACING_AMPLITUDE: 2 V
MDC_IDC_SET_LEADCHNL_RV_PACING_POLARITY: NORMAL
MDC_IDC_SET_LEADCHNL_RV_PACING_PULSEWIDTH: 0.5 MS
MDC_IDC_SET_LEADCHNL_RV_SENSING_ADAPTATION_MODE: NORMAL
MDC_IDC_SET_LEADCHNL_RV_SENSING_POLARITY: NORMAL
MDC_IDC_SET_LEADCHNL_RV_SENSING_SENSITIVITY: 0.6 MV
MDC_IDC_SET_ZONE_DETECTION_INTERVAL: 300 MS
MDC_IDC_SET_ZONE_DETECTION_INTERVAL: 353 MS
MDC_IDC_SET_ZONE_TYPE: NORMAL
MDC_IDC_SET_ZONE_TYPE: NORMAL
MDC_IDC_SET_ZONE_VENDOR_TYPE: NORMAL
MDC_IDC_SET_ZONE_VENDOR_TYPE: NORMAL
MDC_IDC_STAT_AT_BURDEN_PERCENT: 0 %
MDC_IDC_STAT_AT_DTM_END: NORMAL
MDC_IDC_STAT_AT_DTM_START: NORMAL
MDC_IDC_STAT_AT_MODE_SW_COUNT: 5
MDC_IDC_STAT_BRADY_DTM_END: NORMAL
MDC_IDC_STAT_BRADY_DTM_START: NORMAL
MDC_IDC_STAT_BRADY_RA_PERCENT_PACED: 12 %
MDC_IDC_STAT_BRADY_RV_PERCENT_PACED: 96 %
MDC_IDC_STAT_CRT_DTM_END: NORMAL
MDC_IDC_STAT_CRT_DTM_START: NORMAL
MDC_IDC_STAT_CRT_LV_PERCENT_PACED: 99 %
MDC_IDC_STAT_CRT_PERCENT_PACED: 96 %
MDC_IDC_STAT_EPISODE_RECENT_COUNT: 0
MDC_IDC_STAT_EPISODE_RECENT_COUNT: 1
MDC_IDC_STAT_EPISODE_RECENT_COUNT: 5
MDC_IDC_STAT_EPISODE_RECENT_COUNT_DTM_END: NORMAL
MDC_IDC_STAT_EPISODE_RECENT_COUNT_DTM_START: NORMAL
MDC_IDC_STAT_EPISODE_TYPE: NORMAL
MDC_IDC_STAT_EPISODE_VENDOR_TYPE: NORMAL
MDC_IDC_STAT_TACHYTHERAPY_ATP_DELIVERED_RECENT: 0
MDC_IDC_STAT_TACHYTHERAPY_ATP_DELIVERED_TOTAL: 0
MDC_IDC_STAT_TACHYTHERAPY_RECENT_DTM_END: NORMAL
MDC_IDC_STAT_TACHYTHERAPY_RECENT_DTM_START: NORMAL
MDC_IDC_STAT_TACHYTHERAPY_SHOCKS_ABORTED_RECENT: 0
MDC_IDC_STAT_TACHYTHERAPY_SHOCKS_ABORTED_TOTAL: 0
MDC_IDC_STAT_TACHYTHERAPY_SHOCKS_DELIVERED_RECENT: 0
MDC_IDC_STAT_TACHYTHERAPY_SHOCKS_DELIVERED_TOTAL: 2
MDC_IDC_STAT_TACHYTHERAPY_TOTAL_DTM_END: NORMAL
MDC_IDC_STAT_TACHYTHERAPY_TOTAL_DTM_START: NORMAL

## 2021-11-29 DIAGNOSIS — I42.9 CARDIOMYOPATHY (H): ICD-10-CM

## 2021-11-29 DIAGNOSIS — I10 ESSENTIAL HYPERTENSION: ICD-10-CM

## 2021-12-01 RX ORDER — LISINOPRIL 5 MG/1
TABLET ORAL
Qty: 90 TABLET | Refills: 3 | Status: SHIPPED | OUTPATIENT
Start: 2021-12-01 | End: 2022-11-25

## 2021-12-01 NOTE — TELEPHONE ENCOUNTER
"Routing refill request to provider for review/approval because:  Early refill. Has enough through 02/24/2022    Last Written Prescription Date:  02/24/2021  Last Fill Quantity: 90,  # refills: 3   Last office visit provider:  06/15/2021 with Dr Garcia.      Requested Prescriptions   Pending Prescriptions Disp Refills     lisinopril (ZESTRIL) 5 MG tablet [Pharmacy Med Name: LISINOPRIL TABS 5MG] 90 tablet 3     Sig: TAKE 1 TABLET DAILY       ACE Inhibitors (Including Combos) Protocol Passed - 11/29/2021 12:43 PM        Passed - Blood pressure under 140/90 in past 12 months     BP Readings from Last 3 Encounters:   06/15/21 130/70   12/15/20 (!) 140/85   06/12/20 139/85                 Passed - Recent (12 mo) or future (30 days) visit within the authorizing provider's specialty     Patient has had an office visit with the authorizing provider or a provider within the authorizing providers department within the previous 12 mos or has a future within next 30 days. See \"Patient Info\" tab in inbasket, or \"Choose Columns\" in Meds & Orders section of the refill encounter.              Passed - Medication is active on med list        Passed - Patient is age 18 or older        Passed - Normal serum creatinine on file in past 12 months     Recent Labs   Lab Test 06/15/21  1215   CR 1.07       Ok to refill medication if creatinine is low          Passed - Normal serum potassium on file in past 12 months     Recent Labs   Lab Test 06/15/21  1215   POTASSIUM 4.3                  Alexia Arvizu 11/30/21 7:26 PM  "

## 2021-12-12 ASSESSMENT — ACTIVITIES OF DAILY LIVING (ADL): CURRENT_FUNCTION: NO ASSISTANCE NEEDED

## 2021-12-15 ENCOUNTER — OFFICE VISIT (OUTPATIENT)
Dept: FAMILY MEDICINE | Facility: CLINIC | Age: 76
End: 2021-12-15
Payer: COMMERCIAL

## 2021-12-15 VITALS
HEART RATE: 81 BPM | SYSTOLIC BLOOD PRESSURE: 130 MMHG | BODY MASS INDEX: 34.22 KG/M2 | OXYGEN SATURATION: 95 % | DIASTOLIC BLOOD PRESSURE: 70 MMHG | HEIGHT: 70 IN | WEIGHT: 239 LBS

## 2021-12-15 DIAGNOSIS — R73.01 IMPAIRED FASTING GLUCOSE: ICD-10-CM

## 2021-12-15 DIAGNOSIS — I25.5 ISCHEMIC CARDIOMYOPATHY: ICD-10-CM

## 2021-12-15 DIAGNOSIS — D12.6 ADENOMATOUS POLYP OF COLON, UNSPECIFIED PART OF COLON: ICD-10-CM

## 2021-12-15 DIAGNOSIS — E66.811 CLASS 1 OBESITY DUE TO EXCESS CALORIES WITH SERIOUS COMORBIDITY AND BODY MASS INDEX (BMI) OF 34.0 TO 34.9 IN ADULT: ICD-10-CM

## 2021-12-15 DIAGNOSIS — E66.09 CLASS 1 OBESITY DUE TO EXCESS CALORIES WITH SERIOUS COMORBIDITY AND BODY MASS INDEX (BMI) OF 34.0 TO 34.9 IN ADULT: ICD-10-CM

## 2021-12-15 DIAGNOSIS — Z95.810 ICD (IMPLANTABLE CARDIOVERTER-DEFIBRILLATOR), BIVENTRICULAR, IN SITU: ICD-10-CM

## 2021-12-15 DIAGNOSIS — I10 ESSENTIAL HYPERTENSION: ICD-10-CM

## 2021-12-15 DIAGNOSIS — E78.5 HYPERLIPIDEMIA LDL GOAL <70: ICD-10-CM

## 2021-12-15 DIAGNOSIS — Z00.01 ENCOUNTER FOR GENERAL ADULT MEDICAL EXAMINATION WITH ABNORMAL FINDINGS: Primary | ICD-10-CM

## 2021-12-15 DIAGNOSIS — I47.29 NSVT (NONSUSTAINED VENTRICULAR TACHYCARDIA) (H): ICD-10-CM

## 2021-12-15 DIAGNOSIS — D64.9 NORMOCHROMIC NORMOCYTIC ANEMIA: ICD-10-CM

## 2021-12-15 LAB
ANION GAP SERPL CALCULATED.3IONS-SCNC: 11 MMOL/L (ref 5–18)
BUN SERPL-MCNC: 14 MG/DL (ref 8–28)
CALCIUM SERPL-MCNC: 9.1 MG/DL (ref 8.5–10.5)
CHLORIDE BLD-SCNC: 105 MMOL/L (ref 98–107)
CHOLEST SERPL-MCNC: 157 MG/DL
CO2 SERPL-SCNC: 24 MMOL/L (ref 22–31)
CREAT SERPL-MCNC: 1.09 MG/DL (ref 0.7–1.3)
ERYTHROCYTE [DISTWIDTH] IN BLOOD BY AUTOMATED COUNT: 12.7 % (ref 10–15)
FASTING STATUS PATIENT QL REPORTED: YES
GFR SERPL CREATININE-BSD FRML MDRD: 66 ML/MIN/1.73M2
GLUCOSE BLD-MCNC: 98 MG/DL (ref 70–125)
HBA1C MFR BLD: 5.3 % (ref 0–5.6)
HCT VFR BLD AUTO: 46.7 % (ref 40–53)
HDLC SERPL-MCNC: 51 MG/DL
HGB BLD-MCNC: 15.9 G/DL (ref 13.3–17.7)
LDLC SERPL CALC-MCNC: 80 MG/DL
MCH RBC QN AUTO: 31.9 PG (ref 26.5–33)
MCHC RBC AUTO-ENTMCNC: 34 G/DL (ref 31.5–36.5)
MCV RBC AUTO: 94 FL (ref 78–100)
PLATELET # BLD AUTO: 138 10E3/UL (ref 150–450)
POTASSIUM BLD-SCNC: 4 MMOL/L (ref 3.5–5)
RBC # BLD AUTO: 4.99 10E6/UL (ref 4.4–5.9)
SODIUM SERPL-SCNC: 140 MMOL/L (ref 136–145)
TRIGL SERPL-MCNC: 128 MG/DL
WBC # BLD AUTO: 7.3 10E3/UL (ref 4–11)

## 2021-12-15 PROCEDURE — G0438 PPPS, INITIAL VISIT: HCPCS | Performed by: FAMILY MEDICINE

## 2021-12-15 PROCEDURE — 99214 OFFICE O/P EST MOD 30 MIN: CPT | Mod: 25 | Performed by: FAMILY MEDICINE

## 2021-12-15 PROCEDURE — 80061 LIPID PANEL: CPT | Performed by: FAMILY MEDICINE

## 2021-12-15 PROCEDURE — 36415 COLL VENOUS BLD VENIPUNCTURE: CPT | Performed by: FAMILY MEDICINE

## 2021-12-15 PROCEDURE — 85027 COMPLETE CBC AUTOMATED: CPT | Performed by: FAMILY MEDICINE

## 2021-12-15 PROCEDURE — 80048 BASIC METABOLIC PNL TOTAL CA: CPT | Performed by: FAMILY MEDICINE

## 2021-12-15 PROCEDURE — 83036 HEMOGLOBIN GLYCOSYLATED A1C: CPT | Performed by: FAMILY MEDICINE

## 2021-12-15 ASSESSMENT — ACTIVITIES OF DAILY LIVING (ADL): CURRENT_FUNCTION: NO ASSISTANCE NEEDED

## 2021-12-15 ASSESSMENT — MIFFLIN-ST. JEOR: SCORE: 1829.32

## 2021-12-15 NOTE — PATIENT INSTRUCTIONS
Patient Education   Personalized Prevention Plan  You are due for the preventive services outlined below.  Your care team is available to assist you in scheduling these services.  If you have already completed any of these items, please share that information with your care team to update in your medical record.  Health Maintenance Due   Topic Date Due     ANNUAL REVIEW OF HM ORDERS  Never done     LUNG CANCER SCREENING  Never done     Zoster (Shingles) Vaccine (2 of 3) 10/28/2008     FALL RISK ASSESSMENT  12/15/2021       Exercise for a Healthier Heart  You may wonder how you can improve the health of your heart. If you re thinking about exercise, you re on the right track. You don t need to become an athlete. But you do need a certain amount of brisk exercise to help strengthen your heart. If you have been diagnosed with a heart condition, your healthcare provider may advise exercise to help stabilize your condition. To help make exercise a habit, choose safe, fun activities.      Exercise with a friend. When activity is fun, you're more likely to stick with it.   Before you start  Check with your healthcare provider before starting an exercise program. This is especially important if you have not been active for a while. It's also important if you have a long-term (chronic) health problem such as heart disease, diabetes, or obesity. Or if you are at high risk for having these problems.   Why exercise?  Exercising regularly offers many healthy rewards. It can help you do all of the following:     Improve your blood cholesterol level to help prevent further heart trouble    Lower your blood pressure to help prevent a stroke or heart attack    Control diabetes, or reduce your risk of getting this disease    Improve your heart and lung function    Reach and stay at a healthy weight    Make your muscles stronger so you can stay active    Prevent falls and fractures by slowing the loss of bone mass  (osteoporosis)    Manage stress better    Reduce your blood pressure    Improve your sense of self and your body image  Exercise tips      Ease into your routine. Set small goals. Then build on them. If you are not sure what your activity level should be, talk with your healthcare provider first before starting an exercise routine.    Exercise on most days. Aim for a total of 150 minutes (2 hours and 30 minutes) or more of moderate-intensity aerobic activity each week. Or 75 minutes (1 hour and 15 minutes) or more of vigorous-intensity aerobic activity each week. Or try for a combination of both. Moderate activity means that you breathe heavier and your heart rate increases but you can still talk. Think about doing 40 minutes of moderate exercise, 3 to 4 times a week. For best results, activity should last for about 40 minutes to lower blood pressure and cholesterol. It's OK to work up to the 40-minute period over time. Examples of moderate-intensity activity are walking 1 mile in 15 minutes. Or doing 30 to 45 minutes of yard work.    Step up your daily activity level.  Along with your exercise program, try being more active the whole day. Walk instead of drive. Or park further away so that you take more steps each day. Do more household tasks or yard work. You may not be able to meet the advised mount of physical activity. But doing some moderate- or vigorous-intensity aerobic activity can help reduce your risk for heart disease. Your healthcare provider can help you figure out what is best for you.    Choose 1 or more activities you enjoy.  Walking is one of the easiest things you can do. You can also try swimming, riding a bike, dancing, or taking an exercise class.    When to call your healthcare provider  Call your healthcare provider if you have any of these:     Chest pain or feel dizzy or lightheaded    Burning, tightness, pressure, or heaviness in your chest, neck, shoulders, back, or arms    Abnormal  shortness of breath    More joint or muscle pain    A very fast or irregular heartbeat (palpitations)  [employer] last reviewed this educational content on 7/1/2019 2000-2021 [employer]. All rights reserved. This information is not intended as a substitute for professional medical care. Always follow your healthcare professional's instructions.          Understanding USDA MyPlate  The USDA has guidelines to help you make healthy food choices. These are called MyPlate. MyPlate shows the food groups that make up healthy meals using the image of a place setting. Before you eat, think about the healthiest choices for what to put on your plate or in your cup or bowl. To learn more about building a healthy plate, visit www.choosemyplate.gov.    The food groups    Fruits. Any fruit or 100% fruit juice counts as part of the Fruit Group. Fruits may be fresh, canned, frozen, or dried, and may be whole, cut-up, or pureed. Make 1/2 of your plate fruits and vegetables.    Vegetables. Any vegetable or 100% vegetable juice counts as a member of the Vegetable Group. Vegetables may be fresh, frozen, canned, or dried. They can be served raw or cooked and may be whole, cut-up, or mashed. Make 1/2 of your plate fruits and vegetables.    Grains. All foods made from grains are part of the Grains Group. These include wheat, rice, oats, cornmeal, and barley. Grains are often used to make foods such as bread, pasta, oatmeal, cereal, tortillas, and grits. Grains should be no more than 1/4 of your plate. At least half of your grains should be whole grains.    Protein. This group includes meat, poultry, seafood, beans and peas, eggs, processed soy products (such as tofu), nuts (including nut butters), and seeds. Make protein choices no more than 1/4 of your plate. Meat and poultry choices should be lean or low fat.    Dairy. The Dairy Group includes all fluid milk products and foods made from milk that contain calcium, such as  yogurt and cheese. (Foods that have little calcium, such as cream, butter, and cream cheese, are not part of this group.) Most dairy choices should be low-fat or fat-free.    Oils. Oils aren't a food group, but they do contain essential nutrients. However it's important to watch your intake of oils. These are fats that are liquid at room temperature. They include canola, corn, olive, soybean, vegetable, and sunflower oil. Foods that are mainly oil include mayonnaise, certain salad dressings, and soft margarines. You likely already get your daily oil allowance from the foods you eat.  Things to limit  Eating healthy also means limiting these things in your diet:       Salt (sodium). Many processed foods have a lot of sodium. To keep sodium intake down, eat fresh vegetables, meats, poultry, and seafood when possible. Purchase low-sodium, reduced-sodium, or no-salt-added food products at the store. And don't add salt to your meals at home. Instead, season them with herbs and spices such as dill, oregano, cumin, and paprika. Or try adding flavor with lemon or lime zest and juice.    Saturated fat. Saturated fats are most often found in animal products such as beef, pork, and chicken. They are often solid at room temperature, such as butter. To reduce your saturated fat intake, choose leaner cuts of meat and poultry. And try healthier cooking methods such as grilling, broiling, roasting, or baking. For a simple lower-fat swap, use plain nonfat yogurt instead of mayonnaise when making potato salad or macaroni salad.    Added sugars. These are sugars added to foods. They are in foods such as ice cream, candy, soda, fruit drinks, sports drinks, energy drinks, cookies, pastries, jams, and syrups. Cut down on added sugars by sharing sweet treats with a family member or friend. You can also choose fruit for dessert, and drink water or other unsweetened beverages.     [employer] last reviewed this educational content on  6/1/2020 2000-2021 [employer]. All rights reserved. This information is not intended as a substitute for professional medical care. Always follow your healthcare professional's instructions.          Signs of Hearing Loss      Hearing much better with one ear can be a sign of hearing loss.   Hearing loss is a problem shared by many people. In fact, it is one of the most common health problems, particularly as people age. Most people age 65 and older have some hearing loss. By age 80, almost everyone does. Hearing loss often occurs slowly over the years. So you may not realize your hearing has gotten worse.  Have your hearing checked  Call your healthcare provider if you:    Have to strain to hear normal conversation    Have to watch other people s faces very carefully to follow what they re saying    Need to ask people to repeat what they ve said    Often misunderstand what people are saying    Turn the volume of the television or radio up so high that others complain    Feel that people are mumbling when they re talking to you    Find that the effort to hear leaves you feeling tired and irritated    Notice, when using the phone, that you hear better with one ear than the other  [employer] last reviewed this educational content on 1/1/2020 2000-2021 [employer]. All rights reserved. This information is not intended as a substitute for professional medical care. Always follow your healthcare professional's instructions.

## 2021-12-15 NOTE — PROGRESS NOTES
"SUBJECTIVE:     Víctor Calderon is a 76 year old male who presents for Preventive Visit.      Annual wellness visit completed.  Risk associated with suboptimal diet and lack of consistent exercise.  Hearing loss concerns.  Stable findings.  Metoprolol succinate 150 mg daily and lisinopril 5 mg daily for hypertension.  Impaired fasting glucose historically however more recent A1c's have been normal.  Has had prior normochromic normocytic anemia with mildly elevated MPV level noted by patient.  He is interested in having this repeated.  Continues use of simvastatin 40 mg at bedtime for lipid management.  Ischemic cardiomyopathy with prior EF of 51% follows with Dr. Frost.  Has had adenomatous colon polyp and was told to repeat colonoscopy at 5-year interval after completion 2015.  Has been hesitant due to pandemic currently and had elected to wait.  Had smoked over 30-pack-year history as well having quit smoking in .  Patient wants to skip low-dose chest CT for lung cancer screening at this time as well.  Had Pfizer third shot booster 2021 as well as seasonal flu shot at that time.  Comprehensive review of systems as above otherwise all negative.       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 left s/p T & A as child; dual chamber pacemaker placement on 2/9/15 after cardiac arrest.   Quit EtOH 02   ROMELIA on CPAP at 8 cm H2O   PHQ-9 score= (9/22/10)      Patient has been advised of split billing requirements and indicates understanding: Yes   Are you " "in the first 12 months of your Medicare coverage?  No    Healthy Habits:     In general, how would you rate your overall health?  Good    Frequency of exercise:  1 day/week    Duration of exercise:  Less than 15 minutes    Do you usually eat at least 4 servings of fruit and vegetables a day, include whole grains    & fiber and avoid regularly eating high fat or \"junk\" foods?  No    Taking medications regularly:  Yes    Medication side effects:  None    Ability to successfully perform activities of daily living:  No assistance needed    Home Safety:  No safety concerns identified    Hearing Impairment:  Difficulty following a conversation in a noisy restaurant or crowded room    In the past 6 months, have you been bothered by leaking of urine?  No    In general, how would you rate your overall mental or emotional health?  Excellent      PHQ-2 Total Score: 0    Additional concerns today:  No    Do you feel safe in your environment? Yes    Have you ever done Advance Care Planning? (For example, a Health Directive, POLST, or a discussion with a medical provider or your loved ones about your wishes): Yes, advance care planning is on file.       Fall risk  Fallen 2 or more times in the past year?: No  Any fall with injury in the past year?: No    Cognitive Screening   1) Repeat 3 items (Leader, Season, Table)    2) Clock draw: NORMAL  3) 3 item recall: Recalls 3 objects  Results: 3 items recalled: COGNITIVE IMPAIRMENT LESS LIKELY    Mini-CogTM Copyright JOANNA Bernstein. Licensed by the author for use in [facility]; reprinted with permission (vincent@.[email or web address]). All rights reserved.      Do you have sleep apnea, excessive snoring or daytime drowsiness?: yes    Reviewed and updated as needed this visit by clinical staff   Allergies  Meds  Problems            Reviewed and updated as needed this visit by Provider   Allergies  Meds  Problems           Social History     Tobacco Use     Smoking status: Former Smoker     " Packs/day: 0.00     Types: Cigarettes     Quit date: 2008     Years since quittin.5     Smokeless tobacco: Never Used   Substance Use Topics     Alcohol use: No     If you drink alcohol do you typically have >3 drinks per day or >7 drinks per week? No    Alcohol Use 12/15/2021   Prescreen: >3 drinks/day or >7 drinks/week? -   Prescreen: >3 drinks/day or >7 drinks/week? No               Current providers sharing in care for this patient include:   Patient Care Team:  Adams Garcia MD as PCP - General  Adams Garcia MD as Assigned PCP    The following health maintenance items are reviewed in Epic and correct as of today:  Health Maintenance Due   Topic Date Due     LUNG CANCER SCREENING  Never done     ZOSTER IMMUNIZATION (2 of 3) 10/28/2008     FALL RISK ASSESSMENT  12/15/2021     Lab work is in process  Labs reviewed in EPIC  BP Readings from Last 3 Encounters:   12/15/21 130/70   06/15/21 130/70   12/15/20 (!) 140/85    Wt Readings from Last 3 Encounters:   12/15/21 108.4 kg (239 lb)   06/15/21 104.8 kg (231 lb)   12/15/20 112.9 kg (249 lb)                  Patient Active Problem List   Diagnosis     Skin Neoplasm Of Uncertain Behavior     Hypercholesterolemia     Obstructive Sleep Apnea     Esophageal Reflux     Impaired Fasting Glucose     Changed Sexual Interest (Libido): Decreased     Cholelithiasis     Laryngeal Neoplasm Of The Vocal Cord     HTN (hypertension)     Dermatitis     Hoarseness     Male Erectile Disorder     Hypogonadism     Insomnia     Benign Tubular Adenoma Of The Large Intestine     Class 1 obesity due to excess calories with serious comorbidity and body mass index (BMI) of 31.0 to 31.9 in adult     Skin Tag (___ Mm)     Tachycardia     Bradycardia     Cardiac arrest (H)     Heart block AV complete (H)     Right ventricular dysfunction     CAD (coronary artery disease)     Normochromic normocytic anemia     NSVT (nonsustained ventricular tachycardia) (H)     Ischemic  cardiomyopathy     Complete AV block, acquired (H)     Adenomatous colon polyp     Tubular adenoma of colon     ROMELIA on CPAP     ICD (implantable cardioverter-defibrillator), biventricular, in situ     Bilateral hearing loss     Erectile dysfunction, unspecified erectile dysfunction type     Cholelithiasis     Paroxysmal atrial fibrillation (H)     Epigastric pain     Symptomatic cholelithiasis     Choledocholithiasis     Hypoxia     Obesity (BMI 35.0-39.9) with comorbidity (H)     Past Surgical History:   Procedure Laterality Date     CARDIAC CATHETERIZATION       COLONOSCOPY N/A 2015    Procedure: COLONOSCOPY;  Surgeon: Rafiq Gu MD;  Location: [facility];  Service:      HC REMOVE TONSILS/ADENOIDS,<13 Y/O      Description: Tonsillectomy With Adenoidectomy;  Recorded: 2008;     HERNIA REPAIR      inguinal      INSERT / REPLACE / REMOVE PACEMAKER       LAPAROSCOPIC CHOLECYSTECTOMY N/A 10/2/2019    Procedure: CHOLECYSTECTOMY, LAPAROSCOPIC;  Surgeon: Shin Baird MD;  Location: [facility];  Service: General     OTHER SURGICAL HISTORY  2010    polypremoved during colonoscopy     MO ERCP DX COLLECTION SPECIMEN BRUSHING/WASHING N/A 5/10/2020    Procedure: ENDOSCOPIC RETROGRADE CHOLANGIOPANCREATOGRAPHY;  Surgeon: Simon Loaiza MD;  Location: [facility];  Service: Gastroenterology     REMOVAL OF SPERM DUCT(S)      Description: Surgery Of Male Genitalia Vasectomy;  Recorded: 2008;     TONSILLECTOMY       TUMOR REMOVAL      right vocal cord -      XR ERCP BILIARY ONLY  5/10/2020       Social History     Tobacco Use     Smoking status: Former Smoker     Packs/day: 0.00     Types: Cigarettes     Quit date: 2008     Years since quittin.5     Smokeless tobacco: Never Used   Substance Use Topics     Alcohol use: No     Family History   Problem Relation Age of Onset     Pneumonia Mother      Hypertension Mother      Cancer Father      Chronic Obstructive Pulmonary  Disease Sister      Multiple Sclerosis Sister      Acute Myocardial Infarction No family hx of          Current Outpatient Medications   Medication Sig Dispense Refill     aspirin 81 MG EC tablet [ASPIRIN 81 MG EC TABLET] Take 81 mg by mouth daily.       calcium carbonate (CALCIUM CARBONATE) 300 mg (750 mg) Chew [CALCIUM CARBONATE (CALCIUM CARBONATE) 300 MG (750 MG) CHEW] Chew 2-3 tablets as needed (heartburn).              coenzyme Q10 (CO Q-10) 200 mg capsule [COENZYME Q10 (CO Q-10) 200 MG CAPSULE] Take 200 mg by mouth daily.        DIPHENHYDRAMINE HCL (BENADRYL ALLERGY ORAL) [DIPHENHYDRAMINE HCL (BENADRYL ALLERGY ORAL)] Take 25-50 mg by mouth every 6 (six) hours as needed (allergies).              fish oil-omega-3 fatty acids (FISH OIL) 300-1,000 mg capsule [FISH OIL-OMEGA-3 FATTY ACIDS (FISH OIL) 300-1,000 MG CAPSULE] Take 1 g by mouth daily.       latanoprost (XALATAN) 0.005 % ophthalmic solution [LATANOPROST (XALATAN) 0.005 % OPHTHALMIC SOLUTION] Administer 1 drop to both eyes at bedtime.              lisinopril (ZESTRIL) 5 MG tablet TAKE 1 TABLET DAILY 90 tablet 3     metoprolol succinate (TOPROL-XL) 100 MG 24 hr tablet [METOPROLOL SUCCINATE (TOPROL-XL) 100 MG 24 HR TABLET] TAKE 1 TABLET AT BEDTIME 90 tablet 3     metoprolol succinate (TOPROL-XL) 50 MG 24 hr tablet [METOPROLOL SUCCINATE (TOPROL-XL) 50 MG 24 HR TABLET] TAKE 1 TABLET PLUS 1 TABLET  MG DAILY FOR TOTAL DOSE  MG 90 tablet 3     multivitamin therapeutic tablet [MULTIVITAMIN THERAPEUTIC TABLET] Take 1 tablet by mouth daily.       nitroglycerin (NITROSTAT) 0.4 MG SL tablet [NITROGLYCERIN (NITROSTAT) 0.4 MG SL TABLET] Place 1 tablet (0.4 mg total) under the tongue every 5 (five) minutes as needed for chest pain. 100 tablet 3     omeprazole (PRILOSEC) 20 MG capsule [OMEPRAZOLE (PRILOSEC) 20 MG CAPSULE] TAKE 1 CAPSULE AS NEEDED 90 capsule 3     omeprazole (PRILOSEC) 20 MG capsule [OMEPRAZOLE (PRILOSEC) 20 MG CAPSULE] TAKE 1 CAPSULE AS NEEDED  "90 capsule 2     oxymetazoline (AFRIN) 0.05 % nasal spray [OXYMETAZOLINE (AFRIN) 0.05 % NASAL SPRAY] Apply 2 sprays into each nostril daily as needed for congestion.       psyllium (METAMUCIL) 3.4 gram packet [PSYLLIUM (METAMUCIL) 3.4 GRAM PACKET] Take 1 packet by mouth 2 (two) times a day.       sildenafiL (VIAGRA) 50 MG tablet [SILDENAFIL (VIAGRA) 50 MG TABLET] Take 1 tablet (50 mg total) by mouth as needed. 30 tablet 11     simvastatin (ZOCOR) 40 MG tablet Take 1 tablet (40 mg) by mouth At Bedtime 90 tablet 3     tadalafiL (CIALIS) 10 MG tablet [TADALAFIL (CIALIS) 10 MG TABLET] Take 1 tablet (10 mg total) by mouth daily as needed for erectile dysfunction. 30 tablet 11     traMADol (ULTRAM) 50 mg tablet [TRAMADOL (ULTRAM) 50 MG TABLET] Take 1-2 tablets ( mg total) by mouth every 6 (six) hours as needed for pain. Take 1-2 tablets every 6 hours as needed for pain 15 tablet 1     traZODone (DESYREL) 50 MG tablet [TRAZODONE (DESYREL) 50 MG TABLET] TAKE 1 TABLET AT BEDTIME 90 tablet 1     fluticasone propionate (FLONASE) 50 mcg/actuation nasal spray [FLUTICASONE PROPIONATE (FLONASE) 50 MCG/ACTUATION NASAL SPRAY] USE 2 SPRAYS IN EACH NOSTRIL DAILY 48 g 2     Allergies   Allergen Reactions     Zithromax [Azithromycin] Rash       Immunizations UTD        Review of Systems  Constitutional, HEENT, cardiovascular, pulmonary, GI, , musculoskeletal, neuro, skin, endocrine and psych systems are negative, except as otherwise noted.    OBJECTIVE:   /70   Pulse 81   Ht 1.784 m (5' 10.25\")   Wt 108.4 kg (239 lb)   SpO2 95%   BMI 34.05 kg/m   Estimated body mass index is 34.05 kg/m  as calculated from the following:    Height as of this encounter: 1.784 m (5' 10.25\").    Weight as of this encounter: 108.4 kg (239 lb).     Physical Exam  GENERAL: healthy, alert and no distress.  BMI 34.05.  EYES: Eyes grossly normal to inspection, PERRL and conjunctivae and sclerae normal  HENT: ear canals and TM's normal, nose and " mouth without ulcers or lesions  NECK: no adenopathy, no asymmetry, masses, or scars and thyroid normal to palpation  RESP: lungs clear to auscultation - no rales, rhonchi or wheezes  CV: regular rate and rhythm, normal S1 S2, no S3 or S4, no murmur, click or rub, no peripheral edema and peripheral pulses strong  ABDOMEN: soft, nontender, no hepatosplenomegaly, no masses and bowel sounds normal   (male): normal male genitalia without lesions or urethral discharge, no hernia  RECTAL: normal sphincter tone, no rectal masses, prostate normal size, smooth, nontender without nodules or masses  MS: no gross musculoskeletal defects noted, no edema  SKIN: no suspicious lesions or rashes  NEURO: Normal strength and tone, mentation intact and speech normal  PSYCH: mentation appears normal, affect normal/bright    Diagnostic Test Results:  Labs reviewed in Epic  No results found for this or any previous visit (from the past 24 hour(s)).    ASSESSMENT / PLAN:     Encounter for general adult medical examination with abnormal findings  Annual wellness visit completed.  Risks associated with suboptimal diet, lack of consistent exercise and known sensorineural hearing loss.  Annual wellness visits to continue.  - REVIEW OF HEALTH MAINTENANCE PROTOCOL ORDERS    Class 1 obesity due to excess calories with serious comorbidity and body mass index (BMI) of 34.0 to 34.9 in adult  Dietary and exercise modification for weight goal less than 230 pounds initially, less than 220 pounds ideally with reassessment at follow-up exam in 6 months.    Essential hypertension  Hypertension remains stable currently while utilizing metoprolol succinate 150 mg daily and lisinopril 5 mg daily.  - Basic metabolic panel    Hyperlipidemia LDL goal <70  Continues use of simvastatin 40 mg at bedtime.  - Lipid panel reflex to direct LDL Fasting    Impaired fasting glucose  Check A1c and fasting glucose  - Hemoglobin A1c  - Basic metabolic panel    Ischemic  "cardiomyopathy  History of ischemic cardiomyopathy with prior EF 51% and will follow up with Dr. Frost cardiology for ongoing monitoring and management.    NSVT (nonsustained ventricular tachycardia) (H)  History of non sustained VT with ICD in place    ICD (implantable cardioverter-defibrillator), biventricular, in situ  As above.    Normochromic normocytic anemia  Ensure resolution normochromic normocytic anemia.  - CBC with platelets    Adenomatous polyp of colon, unspecified part of colon  Recommendation for repeat colonoscopy at earliest convenience following prior colonoscopy September 30, 2015 with 5-year follow-up recommendation.  - Adult Gastro Ref - Procedure Only       Patient has been advised of split billing requirements and indicates understanding: No  COUNSELING:  Reviewed preventive health counseling, as reflected in patient instructions       Regular exercise       Healthy diet/nutrition       Vision screening       Hearing screening       Dental care       Bladder control       Fall risk prevention       Aspirin prophylaxis        Consider lung cancer screening for ages 55-80 years and 30 pack-year smoking history:   declines currently due to COVID-19 risk       Colon cancer screening    Estimated body mass index is 34.05 kg/m  as calculated from the following:    Height as of this encounter: 1.784 m (5' 10.25\").    Weight as of this encounter: 108.4 kg (239 lb).    Weight management plan: Discussed healthy diet and exercise guidelines    He reports that he quit smoking about 13 years ago. His smoking use included cigarettes. He smoked 0.00 packs per day. He has never used smokeless tobacco.      Appropriate preventive services were discussed with this patient, including applicable screening as appropriate for cardiovascular disease, diabetes, osteopenia/osteoporosis, and glaucoma.  As appropriate for age/gender, discussed screening for colorectal cancer, prostate cancer, breast cancer, and " cervical cancer. Checklist reviewing preventive services available has been given to the patient.    Reviewed patients plan of care and provided an AVS. The Intermediate Care Plan ( asthma action plan, low back pain action plan, and migraine action plan) for Víctor meets the Care Plan requirement. This Care Plan has been established and reviewed with the Patient.    Counseling Resources:  ATP IV Guidelines  Pooled Cohorts Equation Calculator  Breast Cancer Risk Calculator  Breast Cancer: Medication to Reduce Risk  FRAX Risk Assessment  ICSI Preventive Guidelines  Dietary Guidelines for Americans, 2010  [employer]'s MyPlate  ASA Prophylaxis  Lung CA Screening    Adams Garcia MD  [facility]    Identified Health Risks:    He is at risk for lack of exercise and has been provided with information to increase physical activity for the benefit of his well-being.  The patient was counseled and encouraged to consider modifying their diet and eating habits. He was provided with information on recommended healthy diet options.  The patient was provided with written information regarding signs of hearing loss.

## 2022-02-07 DIAGNOSIS — I42.9 CARDIOMYOPATHY (H): ICD-10-CM

## 2022-02-07 RX ORDER — METOPROLOL SUCCINATE 100 MG/1
TABLET, EXTENDED RELEASE ORAL
Qty: 90 TABLET | Refills: 3 | Status: SHIPPED | OUTPATIENT
Start: 2022-02-07 | End: 2023-05-17

## 2022-02-07 RX ORDER — METOPROLOL SUCCINATE 50 MG/1
TABLET, EXTENDED RELEASE ORAL
Qty: 90 TABLET | Refills: 3 | Status: SHIPPED | OUTPATIENT
Start: 2022-02-07 | End: 2023-05-17

## 2022-02-14 DIAGNOSIS — G47.00 INSOMNIA, UNSPECIFIED TYPE: ICD-10-CM

## 2022-02-17 ENCOUNTER — ANCILLARY PROCEDURE (OUTPATIENT)
Dept: CARDIOLOGY | Facility: CLINIC | Age: 77
End: 2022-02-17
Attending: INTERNAL MEDICINE
Payer: COMMERCIAL

## 2022-02-17 DIAGNOSIS — Z95.810 ICD (IMPLANTABLE CARDIOVERTER-DEFIBRILLATOR) IN PLACE: ICD-10-CM

## 2022-02-17 DIAGNOSIS — G47.00 INSOMNIA, UNSPECIFIED TYPE: ICD-10-CM

## 2022-02-17 DIAGNOSIS — I50.9 CHF (CONGESTIVE HEART FAILURE) (H): ICD-10-CM

## 2022-02-17 DIAGNOSIS — I25.5 ISCHEMIC CARDIOMYOPATHY: ICD-10-CM

## 2022-02-17 PROCEDURE — 93296 REM INTERROG EVL PM/IDS: CPT | Performed by: INTERNAL MEDICINE

## 2022-02-17 PROCEDURE — 93295 DEV INTERROG REMOTE 1/2/MLT: CPT | Performed by: INTERNAL MEDICINE

## 2022-02-17 RX ORDER — TRAZODONE HYDROCHLORIDE 50 MG/1
TABLET, FILM COATED ORAL
Qty: 90 TABLET | Refills: 3 | Status: SHIPPED | OUTPATIENT
Start: 2022-02-17 | End: 2024-07-12

## 2022-02-17 RX ORDER — TRAZODONE HYDROCHLORIDE 50 MG/1
TABLET, FILM COATED ORAL
Qty: 90 TABLET | Refills: 3 | Status: SHIPPED | OUTPATIENT
Start: 2022-02-17 | End: 2022-06-15

## 2022-02-17 NOTE — TELEPHONE ENCOUNTER
"Routing refill request to provider for review/approval because:  Early fill requested     Last Written Prescription Date:  9/29/21  Last Fill Quantity: 90,  # refills: 1   Last office visit provider:  12/15/21     Requested Prescriptions   Pending Prescriptions Disp Refills     traZODone (DESYREL) 50 MG tablet [Pharmacy Med Name: TRAZODONE HCL TABS 50MG] 90 tablet 3     Sig: TAKE 1 TABLET AT BEDTIME       Serotonin Modulators Passed - 2/14/2022 11:07 AM        Passed - Recent (12 mo) or future (30 days) visit within the authorizing provider's specialty     Patient has had an office visit with the authorizing provider or a provider within the authorizing providers department within the previous 12 mos or has a future within next 30 days. See \"Patient Info\" tab in inbasket, or \"Choose Columns\" in Meds & Orders section of the refill encounter.              Passed - Medication is active on med list        Passed - Patient is age 18 or older             Rosanne Akers RN 02/17/22 7:03 AM  "

## 2022-02-19 LAB
MDC_IDC_LEAD_IMPLANT_DT: NORMAL
MDC_IDC_LEAD_LOCATION: NORMAL
MDC_IDC_LEAD_LOCATION_DETAIL_1: NORMAL
MDC_IDC_LEAD_MFG: NORMAL
MDC_IDC_LEAD_MODEL: NORMAL
MDC_IDC_LEAD_POLARITY_TYPE: NORMAL
MDC_IDC_LEAD_SERIAL: NORMAL
MDC_IDC_LEAD_SPECIAL_FUNCTION: NORMAL
MDC_IDC_MSMT_BATTERY_DTM: NORMAL
MDC_IDC_MSMT_BATTERY_REMAINING_LONGEVITY: 48 MO
MDC_IDC_MSMT_BATTERY_REMAINING_PERCENTAGE: 68 %
MDC_IDC_MSMT_BATTERY_STATUS: NORMAL
MDC_IDC_MSMT_CAP_CHARGE_DTM: NORMAL
MDC_IDC_MSMT_CAP_CHARGE_DTM: NORMAL
MDC_IDC_MSMT_CAP_CHARGE_ENERGY: 41 J
MDC_IDC_MSMT_CAP_CHARGE_TIME: 11.9 S
MDC_IDC_MSMT_CAP_CHARGE_TIME: 8.8 S
MDC_IDC_MSMT_CAP_CHARGE_TYPE: NORMAL
MDC_IDC_MSMT_CAP_CHARGE_TYPE: NORMAL
MDC_IDC_MSMT_LEADCHNL_LV_IMPEDANCE_VALUE: 729 OHM
MDC_IDC_MSMT_LEADCHNL_LV_IMPEDANCE_VALUE: 729 OHM
MDC_IDC_MSMT_LEADCHNL_LV_PACING_THRESHOLD_AMPLITUDE: 1 V
MDC_IDC_MSMT_LEADCHNL_LV_PACING_THRESHOLD_PULSEWIDTH: 0.5 MS
MDC_IDC_MSMT_LEADCHNL_RA_IMPEDANCE_VALUE: 392 OHM
MDC_IDC_MSMT_LEADCHNL_RA_PACING_THRESHOLD_AMPLITUDE: 0.6 V
MDC_IDC_MSMT_LEADCHNL_RA_PACING_THRESHOLD_PULSEWIDTH: 0.5 MS
MDC_IDC_MSMT_LEADCHNL_RV_IMPEDANCE_VALUE: 372 OHM
MDC_IDC_MSMT_LEADCHNL_RV_PACING_THRESHOLD_AMPLITUDE: 1.1 V
MDC_IDC_MSMT_LEADCHNL_RV_PACING_THRESHOLD_PULSEWIDTH: 0.5 MS
MDC_IDC_PG_IMPLANT_DTM: NORMAL
MDC_IDC_PG_MFG: NORMAL
MDC_IDC_PG_MODEL: NORMAL
MDC_IDC_PG_SERIAL: NORMAL
MDC_IDC_PG_TYPE: NORMAL
MDC_IDC_SESS_CLINIC_NAME: NORMAL
MDC_IDC_SESS_DTM: NORMAL
MDC_IDC_SESS_TYPE: NORMAL
MDC_IDC_SET_BRADY_AT_MODE_SWITCH_MODE: NORMAL
MDC_IDC_SET_BRADY_AT_MODE_SWITCH_RATE: 170 {BEATS}/MIN
MDC_IDC_SET_BRADY_LOWRATE: 60 {BEATS}/MIN
MDC_IDC_SET_BRADY_MAX_SENSOR_RATE: 130 {BEATS}/MIN
MDC_IDC_SET_BRADY_MAX_TRACKING_RATE: 140 {BEATS}/MIN
MDC_IDC_SET_BRADY_MODE: NORMAL
MDC_IDC_SET_BRADY_PAV_DELAY_LOW: 180 MS
MDC_IDC_SET_BRADY_SAV_DELAY_LOW: 150 MS
MDC_IDC_SET_CRT_LVRV_DELAY: 0 MS
MDC_IDC_SET_CRT_PACED_CHAMBERS: NORMAL
MDC_IDC_SET_LEADCHNL_LV_PACING_AMPLITUDE: 2 V
MDC_IDC_SET_LEADCHNL_LV_PACING_ANODE_ELECTRODE_1: NORMAL
MDC_IDC_SET_LEADCHNL_LV_PACING_ANODE_LOCATION_1: NORMAL
MDC_IDC_SET_LEADCHNL_LV_PACING_CATHODE_ELECTRODE_1: NORMAL
MDC_IDC_SET_LEADCHNL_LV_PACING_CATHODE_LOCATION_1: NORMAL
MDC_IDC_SET_LEADCHNL_LV_PACING_PULSEWIDTH: 0.5 MS
MDC_IDC_SET_LEADCHNL_LV_SENSING_ADAPTATION_MODE: NORMAL
MDC_IDC_SET_LEADCHNL_LV_SENSING_ANODE_ELECTRODE_1: NORMAL
MDC_IDC_SET_LEADCHNL_LV_SENSING_ANODE_LOCATION_1: NORMAL
MDC_IDC_SET_LEADCHNL_LV_SENSING_CATHODE_ELECTRODE_1: NORMAL
MDC_IDC_SET_LEADCHNL_LV_SENSING_CATHODE_LOCATION_1: NORMAL
MDC_IDC_SET_LEADCHNL_LV_SENSING_SENSITIVITY: 1 MV
MDC_IDC_SET_LEADCHNL_RA_PACING_AMPLITUDE: 1.2 V
MDC_IDC_SET_LEADCHNL_RA_PACING_POLARITY: NORMAL
MDC_IDC_SET_LEADCHNL_RA_PACING_PULSEWIDTH: 0.5 MS
MDC_IDC_SET_LEADCHNL_RA_SENSING_ADAPTATION_MODE: NORMAL
MDC_IDC_SET_LEADCHNL_RA_SENSING_POLARITY: NORMAL
MDC_IDC_SET_LEADCHNL_RA_SENSING_SENSITIVITY: 0.25 MV
MDC_IDC_SET_LEADCHNL_RV_PACING_AMPLITUDE: 2 V
MDC_IDC_SET_LEADCHNL_RV_PACING_POLARITY: NORMAL
MDC_IDC_SET_LEADCHNL_RV_PACING_PULSEWIDTH: 0.5 MS
MDC_IDC_SET_LEADCHNL_RV_SENSING_ADAPTATION_MODE: NORMAL
MDC_IDC_SET_LEADCHNL_RV_SENSING_POLARITY: NORMAL
MDC_IDC_SET_LEADCHNL_RV_SENSING_SENSITIVITY: 0.6 MV
MDC_IDC_SET_ZONE_DETECTION_INTERVAL: 300 MS
MDC_IDC_SET_ZONE_DETECTION_INTERVAL: 353 MS
MDC_IDC_SET_ZONE_TYPE: NORMAL
MDC_IDC_SET_ZONE_TYPE: NORMAL
MDC_IDC_SET_ZONE_VENDOR_TYPE: NORMAL
MDC_IDC_SET_ZONE_VENDOR_TYPE: NORMAL
MDC_IDC_STAT_AT_BURDEN_PERCENT: 0 %
MDC_IDC_STAT_AT_DTM_END: NORMAL
MDC_IDC_STAT_AT_DTM_START: NORMAL
MDC_IDC_STAT_BRADY_DTM_END: NORMAL
MDC_IDC_STAT_BRADY_DTM_START: NORMAL
MDC_IDC_STAT_BRADY_RA_PERCENT_PACED: 12 %
MDC_IDC_STAT_BRADY_RV_PERCENT_PACED: 94 %
MDC_IDC_STAT_CRT_DTM_END: NORMAL
MDC_IDC_STAT_CRT_DTM_START: NORMAL
MDC_IDC_STAT_CRT_LV_PERCENT_PACED: 99 %
MDC_IDC_STAT_EPISODE_RECENT_COUNT: 0
MDC_IDC_STAT_EPISODE_RECENT_COUNT_DTM_END: NORMAL
MDC_IDC_STAT_EPISODE_RECENT_COUNT_DTM_START: NORMAL
MDC_IDC_STAT_EPISODE_TYPE: NORMAL
MDC_IDC_STAT_EPISODE_VENDOR_TYPE: NORMAL
MDC_IDC_STAT_TACHYTHERAPY_ATP_DELIVERED_RECENT: 0
MDC_IDC_STAT_TACHYTHERAPY_ATP_DELIVERED_TOTAL: 0
MDC_IDC_STAT_TACHYTHERAPY_RECENT_DTM_END: NORMAL
MDC_IDC_STAT_TACHYTHERAPY_RECENT_DTM_START: NORMAL
MDC_IDC_STAT_TACHYTHERAPY_SHOCKS_ABORTED_RECENT: 0
MDC_IDC_STAT_TACHYTHERAPY_SHOCKS_ABORTED_TOTAL: 0
MDC_IDC_STAT_TACHYTHERAPY_SHOCKS_DELIVERED_RECENT: 0
MDC_IDC_STAT_TACHYTHERAPY_SHOCKS_DELIVERED_TOTAL: 2
MDC_IDC_STAT_TACHYTHERAPY_TOTAL_DTM_END: NORMAL
MDC_IDC_STAT_TACHYTHERAPY_TOTAL_DTM_START: NORMAL

## 2022-04-05 ENCOUNTER — ANCILLARY PROCEDURE (OUTPATIENT)
Dept: CARDIOLOGY | Facility: CLINIC | Age: 77
End: 2022-04-05
Attending: INTERNAL MEDICINE
Payer: COMMERCIAL

## 2022-04-05 ENCOUNTER — TELEPHONE (OUTPATIENT)
Dept: CARDIOLOGY | Facility: CLINIC | Age: 77
End: 2022-04-05

## 2022-04-05 DIAGNOSIS — Z95.810 ICD (IMPLANTABLE CARDIOVERTER-DEFIBRILLATOR) IN PLACE: ICD-10-CM

## 2022-04-05 DIAGNOSIS — I50.9 CHF (CONGESTIVE HEART FAILURE) (H): ICD-10-CM

## 2022-04-05 DIAGNOSIS — I25.5 ISCHEMIC CARDIOMYOPATHY: ICD-10-CM

## 2022-04-05 NOTE — TELEPHONE ENCOUNTER
"----- Message from Ciara MARSHALL Darrion sent at 4/5/2022  6:31 AM CDT -----  Regarding: device RN review  First time therapy  Type: alert remote CRT-D transmission for ATP therapy. Courtesy check.   Presenting rhythm: AS-biVP 65-75 bpm.  Battery/lead status: stable.  Arrhythmias: since 2/17/22; one VF episode on 4/4/22 8:15pm, duration 6 seconds, terminated with ATPx1.  Comments: normal ICD function. Device biVP 96%RV 99%LV. Routed to device RN for review.   Device/lead alerts: none. E. Pivec, Device Specialist      Transmission reviewed, Appropriate ATP (before charge) for rapid VT, shock diverted. Appears to be 1st therapy by device. Of note: Prior to VT start there is trigger-pacing event noted. Hx pacemaker dependent with underlying Mobitz II, HR 30s.     Call placed to patient to review findings. Patient states he thinks he recalls a brief episode of \"feeling funny\" last night but nothing to bothersome. Also states he had taken 2 Bendryl night before and it caused him some issues afterwards. Otherwise is feeling ok today. Advised to call with any near-syncope/shocks. Reviewed when to seek ER. Verbalized understanding.     Will review with Dr. Tobar in Dr. Bond's absence. (snapshot below) full report scanned in.     Ramona Fonseca RN          "

## 2022-04-06 LAB
MDC_IDC_EPISODE_DTM: NORMAL
MDC_IDC_EPISODE_DURATION: 17 S
MDC_IDC_EPISODE_ID: NORMAL
MDC_IDC_EPISODE_TYPE: NORMAL
MDC_IDC_EPISODE_VENDOR_TYPE: NORMAL
MDC_IDC_LEAD_IMPLANT_DT: NORMAL
MDC_IDC_LEAD_LOCATION: NORMAL
MDC_IDC_LEAD_LOCATION_DETAIL_1: NORMAL
MDC_IDC_LEAD_MFG: NORMAL
MDC_IDC_LEAD_MODEL: NORMAL
MDC_IDC_LEAD_POLARITY_TYPE: NORMAL
MDC_IDC_LEAD_SERIAL: NORMAL
MDC_IDC_LEAD_SPECIAL_FUNCTION: NORMAL
MDC_IDC_MSMT_BATTERY_DTM: NORMAL
MDC_IDC_MSMT_BATTERY_REMAINING_LONGEVITY: 48 MO
MDC_IDC_MSMT_BATTERY_REMAINING_PERCENTAGE: 67 %
MDC_IDC_MSMT_BATTERY_STATUS: NORMAL
MDC_IDC_MSMT_CAP_CHARGE_DTM: NORMAL
MDC_IDC_MSMT_CAP_CHARGE_DTM: NORMAL
MDC_IDC_MSMT_CAP_CHARGE_ENERGY: 41 J
MDC_IDC_MSMT_CAP_CHARGE_TIME: 11.9 S
MDC_IDC_MSMT_CAP_CHARGE_TIME: 8.8 S
MDC_IDC_MSMT_CAP_CHARGE_TYPE: NORMAL
MDC_IDC_MSMT_CAP_CHARGE_TYPE: NORMAL
MDC_IDC_MSMT_LEADCHNL_LV_IMPEDANCE_VALUE: 781 OHM
MDC_IDC_MSMT_LEADCHNL_LV_PACING_THRESHOLD_AMPLITUDE: 1 V
MDC_IDC_MSMT_LEADCHNL_LV_PACING_THRESHOLD_PULSEWIDTH: 0.5 MS
MDC_IDC_MSMT_LEADCHNL_RA_IMPEDANCE_VALUE: 351 OHM
MDC_IDC_MSMT_LEADCHNL_RA_PACING_THRESHOLD_AMPLITUDE: 0.6 V
MDC_IDC_MSMT_LEADCHNL_RA_PACING_THRESHOLD_PULSEWIDTH: 0.5 MS
MDC_IDC_MSMT_LEADCHNL_RV_IMPEDANCE_VALUE: 377 OHM
MDC_IDC_MSMT_LEADCHNL_RV_PACING_THRESHOLD_AMPLITUDE: 1.1 V
MDC_IDC_MSMT_LEADCHNL_RV_PACING_THRESHOLD_PULSEWIDTH: 0.5 MS
MDC_IDC_PG_IMPLANT_DTM: NORMAL
MDC_IDC_PG_MFG: NORMAL
MDC_IDC_PG_MODEL: NORMAL
MDC_IDC_PG_SERIAL: NORMAL
MDC_IDC_PG_TYPE: NORMAL
MDC_IDC_SESS_CLINIC_NAME: NORMAL
MDC_IDC_SESS_DTM: NORMAL
MDC_IDC_SESS_TYPE: NORMAL
MDC_IDC_SET_BRADY_AT_MODE_SWITCH_MODE: NORMAL
MDC_IDC_SET_BRADY_AT_MODE_SWITCH_RATE: 170 {BEATS}/MIN
MDC_IDC_SET_BRADY_LOWRATE: 60 {BEATS}/MIN
MDC_IDC_SET_BRADY_MAX_SENSOR_RATE: 130 {BEATS}/MIN
MDC_IDC_SET_BRADY_MAX_TRACKING_RATE: 140 {BEATS}/MIN
MDC_IDC_SET_BRADY_MODE: NORMAL
MDC_IDC_SET_BRADY_PAV_DELAY_LOW: 180 MS
MDC_IDC_SET_BRADY_SAV_DELAY_LOW: 150 MS
MDC_IDC_SET_CRT_LVRV_DELAY: 0 MS
MDC_IDC_SET_CRT_PACED_CHAMBERS: NORMAL
MDC_IDC_SET_LEADCHNL_LV_PACING_AMPLITUDE: 2 V
MDC_IDC_SET_LEADCHNL_LV_PACING_ANODE_ELECTRODE_1: NORMAL
MDC_IDC_SET_LEADCHNL_LV_PACING_ANODE_LOCATION_1: NORMAL
MDC_IDC_SET_LEADCHNL_LV_PACING_CATHODE_ELECTRODE_1: NORMAL
MDC_IDC_SET_LEADCHNL_LV_PACING_CATHODE_LOCATION_1: NORMAL
MDC_IDC_SET_LEADCHNL_LV_PACING_PULSEWIDTH: 0.5 MS
MDC_IDC_SET_LEADCHNL_LV_SENSING_ADAPTATION_MODE: NORMAL
MDC_IDC_SET_LEADCHNL_LV_SENSING_ANODE_ELECTRODE_1: NORMAL
MDC_IDC_SET_LEADCHNL_LV_SENSING_ANODE_LOCATION_1: NORMAL
MDC_IDC_SET_LEADCHNL_LV_SENSING_CATHODE_ELECTRODE_1: NORMAL
MDC_IDC_SET_LEADCHNL_LV_SENSING_CATHODE_LOCATION_1: NORMAL
MDC_IDC_SET_LEADCHNL_LV_SENSING_SENSITIVITY: 1 MV
MDC_IDC_SET_LEADCHNL_RA_PACING_AMPLITUDE: 1.2 V
MDC_IDC_SET_LEADCHNL_RA_PACING_POLARITY: NORMAL
MDC_IDC_SET_LEADCHNL_RA_PACING_PULSEWIDTH: 0.5 MS
MDC_IDC_SET_LEADCHNL_RA_SENSING_ADAPTATION_MODE: NORMAL
MDC_IDC_SET_LEADCHNL_RA_SENSING_POLARITY: NORMAL
MDC_IDC_SET_LEADCHNL_RA_SENSING_SENSITIVITY: 0.25 MV
MDC_IDC_SET_LEADCHNL_RV_PACING_AMPLITUDE: 2 V
MDC_IDC_SET_LEADCHNL_RV_PACING_POLARITY: NORMAL
MDC_IDC_SET_LEADCHNL_RV_PACING_PULSEWIDTH: 0.5 MS
MDC_IDC_SET_LEADCHNL_RV_SENSING_ADAPTATION_MODE: NORMAL
MDC_IDC_SET_LEADCHNL_RV_SENSING_POLARITY: NORMAL
MDC_IDC_SET_LEADCHNL_RV_SENSING_SENSITIVITY: 0.6 MV
MDC_IDC_SET_ZONE_DETECTION_INTERVAL: 300 MS
MDC_IDC_SET_ZONE_DETECTION_INTERVAL: 353 MS
MDC_IDC_SET_ZONE_TYPE: NORMAL
MDC_IDC_SET_ZONE_TYPE: NORMAL
MDC_IDC_SET_ZONE_VENDOR_TYPE: NORMAL
MDC_IDC_SET_ZONE_VENDOR_TYPE: NORMAL
MDC_IDC_STAT_AT_BURDEN_PERCENT: 0 %
MDC_IDC_STAT_AT_DTM_END: NORMAL
MDC_IDC_STAT_AT_DTM_START: NORMAL
MDC_IDC_STAT_BRADY_DTM_END: NORMAL
MDC_IDC_STAT_BRADY_DTM_START: NORMAL
MDC_IDC_STAT_BRADY_RA_PERCENT_PACED: 13 %
MDC_IDC_STAT_BRADY_RV_PERCENT_PACED: 96 %
MDC_IDC_STAT_CRT_DTM_END: NORMAL
MDC_IDC_STAT_CRT_DTM_START: NORMAL
MDC_IDC_STAT_CRT_LV_PERCENT_PACED: 99 %
MDC_IDC_STAT_EPISODE_RECENT_COUNT: 0
MDC_IDC_STAT_EPISODE_RECENT_COUNT: 1
MDC_IDC_STAT_EPISODE_RECENT_COUNT_DTM_END: NORMAL
MDC_IDC_STAT_EPISODE_RECENT_COUNT_DTM_START: NORMAL
MDC_IDC_STAT_EPISODE_TYPE: NORMAL
MDC_IDC_STAT_EPISODE_VENDOR_TYPE: NORMAL
MDC_IDC_STAT_TACHYTHERAPY_ATP_DELIVERED_RECENT: 1
MDC_IDC_STAT_TACHYTHERAPY_ATP_DELIVERED_TOTAL: 1
MDC_IDC_STAT_TACHYTHERAPY_RECENT_DTM_END: NORMAL
MDC_IDC_STAT_TACHYTHERAPY_RECENT_DTM_START: NORMAL
MDC_IDC_STAT_TACHYTHERAPY_SHOCKS_ABORTED_RECENT: 1
MDC_IDC_STAT_TACHYTHERAPY_SHOCKS_ABORTED_TOTAL: 1
MDC_IDC_STAT_TACHYTHERAPY_SHOCKS_DELIVERED_RECENT: 0
MDC_IDC_STAT_TACHYTHERAPY_SHOCKS_DELIVERED_TOTAL: 2
MDC_IDC_STAT_TACHYTHERAPY_TOTAL_DTM_END: NORMAL
MDC_IDC_STAT_TACHYTHERAPY_TOTAL_DTM_START: NORMAL

## 2022-04-08 ENCOUNTER — DOCUMENTATION ONLY (OUTPATIENT)
Dept: CARDIOLOGY | Facility: CLINIC | Age: 77
End: 2022-04-08
Payer: COMMERCIAL

## 2022-04-08 NOTE — TELEPHONE ENCOUNTER
4/8/2022 Called [facility] and left message to call Device clinic. Dr Tobar has a minor adjustment he would like us to make on his ICD.  Will call him back on Monday.  Tracie Jane, Device RN.

## 2022-04-08 NOTE — PROGRESS NOTES
Episode ventricular tachycardia March 4, 2022 at 8:15 pm; seem to to be triggered by by the trigger went into ventricular tachycardia was pace terminated-ATP x1  Impression  Proarrhythmia from pacing settings with BiV pacing-trigger  Plan  Turn off BiV trigger

## 2022-04-20 ENCOUNTER — ANCILLARY PROCEDURE (OUTPATIENT)
Dept: CARDIOLOGY | Facility: CLINIC | Age: 77
End: 2022-04-20
Attending: INTERNAL MEDICINE
Payer: COMMERCIAL

## 2022-04-20 DIAGNOSIS — I50.9 CHF (CONGESTIVE HEART FAILURE) (H): ICD-10-CM

## 2022-04-20 DIAGNOSIS — Z95.810 BIVENTRICULAR ICD (IMPLANTABLE CARDIOVERTER-DEFIBRILLATOR) IN PLACE: ICD-10-CM

## 2022-04-20 DIAGNOSIS — I44.2 CHB (COMPLETE HEART BLOCK) (H): ICD-10-CM

## 2022-04-20 LAB
MDC_IDC_LEAD_IMPLANT_DT: NORMAL
MDC_IDC_LEAD_LOCATION: NORMAL
MDC_IDC_LEAD_LOCATION_DETAIL_1: NORMAL
MDC_IDC_LEAD_MFG: NORMAL
MDC_IDC_LEAD_MODEL: NORMAL
MDC_IDC_LEAD_POLARITY_TYPE: NORMAL
MDC_IDC_LEAD_SERIAL: NORMAL
MDC_IDC_LEAD_SPECIAL_FUNCTION: NORMAL
MDC_IDC_MSMT_BATTERY_DTM: NORMAL
MDC_IDC_MSMT_BATTERY_REMAINING_LONGEVITY: 48 MO
MDC_IDC_MSMT_BATTERY_REMAINING_PERCENTAGE: 68 %
MDC_IDC_MSMT_BATTERY_STATUS: NORMAL
MDC_IDC_MSMT_CAP_CHARGE_DTM: NORMAL
MDC_IDC_MSMT_CAP_CHARGE_DTM: NORMAL
MDC_IDC_MSMT_CAP_CHARGE_ENERGY: 41 J
MDC_IDC_MSMT_CAP_CHARGE_TIME: 11.9 S
MDC_IDC_MSMT_CAP_CHARGE_TIME: 8.8 S
MDC_IDC_MSMT_CAP_CHARGE_TYPE: NORMAL
MDC_IDC_MSMT_CAP_CHARGE_TYPE: NORMAL
MDC_IDC_MSMT_LEADCHNL_LV_IMPEDANCE_VALUE: 777 OHM
MDC_IDC_MSMT_LEADCHNL_RA_IMPEDANCE_VALUE: 364 OHM
MDC_IDC_MSMT_LEADCHNL_RV_IMPEDANCE_VALUE: 375 OHM
MDC_IDC_PG_IMPLANT_DTM: NORMAL
MDC_IDC_PG_MFG: NORMAL
MDC_IDC_PG_MODEL: NORMAL
MDC_IDC_PG_SERIAL: NORMAL
MDC_IDC_PG_TYPE: NORMAL
MDC_IDC_SESS_CLINIC_NAME: NORMAL
MDC_IDC_SESS_DTM: NORMAL
MDC_IDC_SESS_TYPE: NORMAL
MDC_IDC_SET_BRADY_AT_MODE_SWITCH_MODE: NORMAL
MDC_IDC_SET_BRADY_AT_MODE_SWITCH_RATE: 170 {BEATS}/MIN
MDC_IDC_SET_BRADY_LOWRATE: 60 {BEATS}/MIN
MDC_IDC_SET_BRADY_MAX_SENSOR_RATE: 130 {BEATS}/MIN
MDC_IDC_SET_BRADY_MAX_TRACKING_RATE: 140 {BEATS}/MIN
MDC_IDC_SET_BRADY_MODE: NORMAL
MDC_IDC_SET_BRADY_PAV_DELAY_LOW: 180 MS
MDC_IDC_SET_BRADY_SAV_DELAY_LOW: 150 MS
MDC_IDC_SET_CRT_LVRV_DELAY: 0 MS
MDC_IDC_SET_CRT_PACED_CHAMBERS: NORMAL
MDC_IDC_SET_LEADCHNL_LV_PACING_AMPLITUDE: 2 V
MDC_IDC_SET_LEADCHNL_LV_PACING_ANODE_ELECTRODE_1: NORMAL
MDC_IDC_SET_LEADCHNL_LV_PACING_ANODE_LOCATION_1: NORMAL
MDC_IDC_SET_LEADCHNL_LV_PACING_CATHODE_ELECTRODE_1: NORMAL
MDC_IDC_SET_LEADCHNL_LV_PACING_CATHODE_LOCATION_1: NORMAL
MDC_IDC_SET_LEADCHNL_LV_PACING_PULSEWIDTH: 0.5 MS
MDC_IDC_SET_LEADCHNL_LV_SENSING_ADAPTATION_MODE: NORMAL
MDC_IDC_SET_LEADCHNL_LV_SENSING_ANODE_ELECTRODE_1: NORMAL
MDC_IDC_SET_LEADCHNL_LV_SENSING_ANODE_LOCATION_1: NORMAL
MDC_IDC_SET_LEADCHNL_LV_SENSING_CATHODE_ELECTRODE_1: NORMAL
MDC_IDC_SET_LEADCHNL_LV_SENSING_CATHODE_LOCATION_1: NORMAL
MDC_IDC_SET_LEADCHNL_LV_SENSING_SENSITIVITY: 1 MV
MDC_IDC_SET_LEADCHNL_RA_PACING_AMPLITUDE: 1.2 V
MDC_IDC_SET_LEADCHNL_RA_PACING_POLARITY: NORMAL
MDC_IDC_SET_LEADCHNL_RA_PACING_PULSEWIDTH: 0.5 MS
MDC_IDC_SET_LEADCHNL_RA_SENSING_ADAPTATION_MODE: NORMAL
MDC_IDC_SET_LEADCHNL_RA_SENSING_POLARITY: NORMAL
MDC_IDC_SET_LEADCHNL_RA_SENSING_SENSITIVITY: 0.25 MV
MDC_IDC_SET_LEADCHNL_RV_PACING_AMPLITUDE: 2 V
MDC_IDC_SET_LEADCHNL_RV_PACING_POLARITY: NORMAL
MDC_IDC_SET_LEADCHNL_RV_PACING_PULSEWIDTH: 0.5 MS
MDC_IDC_SET_LEADCHNL_RV_SENSING_ADAPTATION_MODE: NORMAL
MDC_IDC_SET_LEADCHNL_RV_SENSING_POLARITY: NORMAL
MDC_IDC_SET_LEADCHNL_RV_SENSING_SENSITIVITY: 0.6 MV
MDC_IDC_SET_ZONE_DETECTION_INTERVAL: 300 MS
MDC_IDC_SET_ZONE_DETECTION_INTERVAL: 353 MS
MDC_IDC_SET_ZONE_TYPE: NORMAL
MDC_IDC_SET_ZONE_TYPE: NORMAL
MDC_IDC_SET_ZONE_VENDOR_TYPE: NORMAL
MDC_IDC_SET_ZONE_VENDOR_TYPE: NORMAL
MDC_IDC_STAT_AT_BURDEN_PERCENT: 0 %
MDC_IDC_STAT_AT_DTM_END: NORMAL
MDC_IDC_STAT_AT_DTM_START: NORMAL
MDC_IDC_STAT_BRADY_DTM_END: NORMAL
MDC_IDC_STAT_BRADY_DTM_START: NORMAL
MDC_IDC_STAT_BRADY_RA_PERCENT_PACED: 12 %
MDC_IDC_STAT_BRADY_RV_PERCENT_PACED: 96 %
MDC_IDC_STAT_CRT_DTM_END: NORMAL
MDC_IDC_STAT_CRT_DTM_START: NORMAL
MDC_IDC_STAT_CRT_LV_PERCENT_PACED: 99 %
MDC_IDC_STAT_EPISODE_RECENT_COUNT: 0
MDC_IDC_STAT_EPISODE_RECENT_COUNT: 1
MDC_IDC_STAT_EPISODE_RECENT_COUNT_DTM_END: NORMAL
MDC_IDC_STAT_EPISODE_RECENT_COUNT_DTM_START: NORMAL
MDC_IDC_STAT_EPISODE_TYPE: NORMAL
MDC_IDC_STAT_EPISODE_VENDOR_TYPE: NORMAL
MDC_IDC_STAT_TACHYTHERAPY_ATP_DELIVERED_RECENT: 1
MDC_IDC_STAT_TACHYTHERAPY_ATP_DELIVERED_TOTAL: 1
MDC_IDC_STAT_TACHYTHERAPY_RECENT_DTM_END: NORMAL
MDC_IDC_STAT_TACHYTHERAPY_RECENT_DTM_START: NORMAL
MDC_IDC_STAT_TACHYTHERAPY_SHOCKS_ABORTED_RECENT: 1
MDC_IDC_STAT_TACHYTHERAPY_SHOCKS_ABORTED_TOTAL: 1
MDC_IDC_STAT_TACHYTHERAPY_SHOCKS_DELIVERED_RECENT: 0
MDC_IDC_STAT_TACHYTHERAPY_SHOCKS_DELIVERED_TOTAL: 2
MDC_IDC_STAT_TACHYTHERAPY_TOTAL_DTM_END: NORMAL
MDC_IDC_STAT_TACHYTHERAPY_TOTAL_DTM_START: NORMAL

## 2022-06-11 DIAGNOSIS — K21.9 GASTROESOPHAGEAL REFLUX DISEASE WITHOUT ESOPHAGITIS: Primary | ICD-10-CM

## 2022-06-12 NOTE — TELEPHONE ENCOUNTER
"Routing refill request to provider for review/approval because:  Early fill requested  Please review for associated diagnosis  Multiple scripts on file for this medication - please review     Last Written Prescription Date:  7/10/21  Last Fill Quantity: 90,  # refills: 3   Last office visit provider:  12/15/21     Requested Prescriptions   Pending Prescriptions Disp Refills     omeprazole (PRILOSEC) 20 MG DR capsule [Pharmacy Med Name: OMEPRAZOLE DR CAPS 20MG] 90 capsule 3     Sig: TAKE 1 CAPSULE AS NEEDED       PPI Protocol Passed - 6/11/2022  1:48 PM        Passed - Not on Clopidogrel (unless Pantoprazole ordered)        Passed - No diagnosis of osteoporosis on record        Passed - Recent (12 mo) or future (30 days) visit within the authorizing provider's specialty     Patient has had an office visit with the authorizing provider or a provider within the authorizing providers department within the previous 12 mos or has a future within next 30 days. See \"Patient Info\" tab in inbasket, or \"Choose Columns\" in Meds & Orders section of the refill encounter.              Passed - Medication is active on med list        Passed - Patient is age 18 or older             Rosanne Akers RN 06/12/22 1:53 PM  "

## 2022-06-15 ENCOUNTER — OFFICE VISIT (OUTPATIENT)
Dept: FAMILY MEDICINE | Facility: CLINIC | Age: 77
End: 2022-06-15
Payer: COMMERCIAL

## 2022-06-15 VITALS
HEART RATE: 91 BPM | WEIGHT: 233 LBS | DIASTOLIC BLOOD PRESSURE: 70 MMHG | SYSTOLIC BLOOD PRESSURE: 120 MMHG | OXYGEN SATURATION: 95 % | BODY MASS INDEX: 33.19 KG/M2

## 2022-06-15 DIAGNOSIS — I10 ESSENTIAL HYPERTENSION: Primary | ICD-10-CM

## 2022-06-15 DIAGNOSIS — I25.5 ISCHEMIC CARDIOMYOPATHY: ICD-10-CM

## 2022-06-15 DIAGNOSIS — E78.5 HYPERLIPIDEMIA LDL GOAL <70: ICD-10-CM

## 2022-06-15 DIAGNOSIS — Z95.810 ICD (IMPLANTABLE CARDIOVERTER-DEFIBRILLATOR), BIVENTRICULAR, IN SITU: ICD-10-CM

## 2022-06-15 DIAGNOSIS — I47.29 NSVT (NONSUSTAINED VENTRICULAR TACHYCARDIA) (H): ICD-10-CM

## 2022-06-15 PROBLEM — E66.01 MORBID OBESITY (H): Status: RESOLVED | Noted: 2020-12-15 | Resolved: 2022-06-15

## 2022-06-15 LAB
ALBUMIN SERPL-MCNC: 3.9 G/DL (ref 3.5–5)
ALP SERPL-CCNC: 60 U/L (ref 45–120)
ALT SERPL W P-5'-P-CCNC: 18 U/L (ref 0–45)
ANION GAP SERPL CALCULATED.3IONS-SCNC: 12 MMOL/L (ref 5–18)
AST SERPL W P-5'-P-CCNC: 19 U/L (ref 0–40)
BILIRUB SERPL-MCNC: 1.2 MG/DL (ref 0–1)
BUN SERPL-MCNC: 16 MG/DL (ref 8–28)
CALCIUM SERPL-MCNC: 9 MG/DL (ref 8.5–10.5)
CHLORIDE BLD-SCNC: 104 MMOL/L (ref 98–107)
CO2 SERPL-SCNC: 24 MMOL/L (ref 22–31)
CREAT SERPL-MCNC: 0.8 MG/DL (ref 0.7–1.3)
GFR SERPL CREATININE-BSD FRML MDRD: >90 ML/MIN/1.73M2
GLUCOSE BLD-MCNC: 102 MG/DL (ref 70–125)
POTASSIUM BLD-SCNC: 4.1 MMOL/L (ref 3.5–5)
PROT SERPL-MCNC: 7 G/DL (ref 6–8)
SODIUM SERPL-SCNC: 140 MMOL/L (ref 136–145)

## 2022-06-15 PROCEDURE — 99000 SPECIMEN HANDLING OFFICE-LAB: CPT | Performed by: FAMILY MEDICINE

## 2022-06-15 PROCEDURE — 36415 COLL VENOUS BLD VENIPUNCTURE: CPT | Performed by: FAMILY MEDICINE

## 2022-06-15 PROCEDURE — 83695 ASSAY OF LIPOPROTEIN(A): CPT | Mod: 90 | Performed by: FAMILY MEDICINE

## 2022-06-15 PROCEDURE — 80053 COMPREHEN METABOLIC PANEL: CPT | Performed by: FAMILY MEDICINE

## 2022-06-15 PROCEDURE — 99214 OFFICE O/P EST MOD 30 MIN: CPT | Performed by: FAMILY MEDICINE

## 2022-06-15 NOTE — PROGRESS NOTES
"Assessment/Plan:    Essential hypertension  Hypertension well-controlled blood pressure 128/72 on recheck and continuing metoprolol succinate 150 mg daily and lisinopril 5 mg daily.  Reassess at annual wellness visit in 6 months.    - Comprehensive metabolic panel    Ischemic cardiomyopathy  Ischemic cardiomyopathy remains asymptomatic currently.  Prior EF 51% however describes more recent testing suggesting closer to 60%.    ICD (implantable cardioverter-defibrillator), biventricular, in situ  Noted ICD setting adjustment as below.    NSVT (nonsustained ventricular tachycardia) (H)  Reviewed recent episode ventricular tachycardia March 4, 2022 at 8:15 pm; seem to to be triggered by by the trigger went into ventricular tachycardia was pace terminated-ATP x1.  Proarrhythmia from pacing settings with BiV pacing-trigger therefore turned off BiV trigger at that time.  - Comprehensive metabolic panel    Hyperlipidemia LDL goal <70  Patient requesting lipoprotein (a).  This will be completed today.  Lipid cascade at follow-up in 6 months.  - Lipoprotein (a)        Subjective:    Víctor Calderon is seen today for follow-up assessment.  Hypertension treated with lisinopril 5 mg daily metoprolol succinate 150 mg daily.  Simvastatin 40 mg at bedtime continues.  Prior A1c of 5.3% December 15, 2021 with history of impaired fasting glucose.  Ischemic cardiomyopathy with EF 51% historically.  ICD in place of ventricular tachycardia history noted.  Described recent history of \"atrial fibrillation\" however chart review does suggest ventricular tachycardia paced terminated with proarrhythmia secondary to pacing settings therefore BiV was turned off and has not had further concerns since that time.  Denies recent illness.  Comprehensive review of systems as above otherwise all negative.  Denies orthopnea or PND symptoms.  Uses either sildenafil or Cialis for male erectile dysfunction with nasal congestion as side effect noted.  Gets " "from [facility].     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 left s/p T & A as child; dual chamber pacemaker placement on 2/9/15 after cardiac arrest.   Quit EtOH 02   ROMELIA on CPAP at 8 cm H2O   PHQ-9 score= (9/22/10)    Past Surgical History:   Procedure Laterality Date     CARDIAC CATHETERIZATION       COLONOSCOPY N/A 2015    Procedure: COLONOSCOPY;  Surgeon: Rafiq Gu MD;  Location: [facility];  Service:      HC REMOVE TONSILS/ADENOIDS,<11 Y/O      Description: Tonsillectomy With Adenoidectomy;  Recorded: 2008;     HERNIA REPAIR      inguinal      INSERT / REPLACE / REMOVE PACEMAKER       LAPAROSCOPIC CHOLECYSTECTOMY N/A 10/2/2019    Procedure: CHOLECYSTECTOMY, LAPAROSCOPIC;  Surgeon: Shin Baird MD;  Location: [facility] OR;  Service: General     OTHER SURGICAL HISTORY      polypremoved during colonoscopy     MI ERCP DX COLLECTION SPECIMEN BRUSHING/WASHING N/A 5/10/2020    Procedure: ENDOSCOPIC RETROGRADE CHOLANGIOPANCREATOGRAPHY;  Surgeon: Simon Loaiza MD;  Location: [facility];  Service: Gastroenterology     REMOVAL OF SPERM DUCT(S)      Description: Surgery Of Male Genitalia Vasectomy;  Recorded: 2008;     TONSILLECTOMY       TUMOR REMOVAL      right vocal cord -      XR ERCP BILIARY ONLY  5/10/2020        Family History   Problem Relation Age of Onset     Pneumonia Mother      Hypertension Mother      Cancer Father      Chronic Obstructive Pulmonary Disease Sister      Multiple Sclerosis " Sister      Acute Myocardial Infarction No family hx of         Past Medical History:   Diagnosis Date     Cholelithiasis      Coronary artery disease      Dermatitis      GERD (gastroesophageal reflux disease)      H/O cardiac arrest 2015    alka/torsades     Hyperlipidemia      Hypertension      Hypogonadism male      Male erectile disorder      Obesity      Sleep apnea     CPAP     Ventricular tachycardia (H)     seen on pacer interrogation        Social History     Tobacco Use     Smoking status: Former Smoker     Packs/day: 0.00     Types: Cigarettes     Quit date: 2008     Years since quittin.0     Smokeless tobacco: Never Used   Substance Use Topics     Alcohol use: No     Drug use: No        Current Outpatient Medications   Medication Sig Dispense Refill     aspirin 81 MG EC tablet [ASPIRIN 81 MG EC TABLET] Take 81 mg by mouth daily.       calcium carbonate (CALCIUM CARBONATE) 300 mg (750 mg) Chew [CALCIUM CARBONATE (CALCIUM CARBONATE) 300 MG (750 MG) CHEW] Chew 2-3 tablets as needed (heartburn).              coenzyme Q10 (CO Q-10) 200 mg capsule [COENZYME Q10 (CO Q-10) 200 MG CAPSULE] Take 200 mg by mouth daily.        DIPHENHYDRAMINE HCL (BENADRYL ALLERGY ORAL) [DIPHENHYDRAMINE HCL (BENADRYL ALLERGY ORAL)] Take 25-50 mg by mouth every 6 (six) hours as needed (allergies).              fish oil-omega-3 fatty acids (FISH OIL) 300-1,000 mg capsule [FISH OIL-OMEGA-3 FATTY ACIDS (FISH OIL) 300-1,000 MG CAPSULE] Take 1 g by mouth daily.       latanoprost (XALATAN) 0.005 % ophthalmic solution [LATANOPROST (XALATAN) 0.005 % OPHTHALMIC SOLUTION] Administer 1 drop to both eyes at bedtime.              lisinopril (ZESTRIL) 5 MG tablet TAKE 1 TABLET DAILY 90 tablet 3     metoprolol succinate ER (TOPROL-XL) 100 MG 24 hr tablet TAKE 1 TABLET AT BEDTIME 90 tablet 3     metoprolol succinate ER (TOPROL-XL) 50 MG 24 hr tablet TAKE 1 TABLET PLUS 1 TABLET  MG DAILY FOR TOTAL DOSE  MG 90 tablet 3      multivitamin therapeutic tablet [MULTIVITAMIN THERAPEUTIC TABLET] Take 1 tablet by mouth daily.       nitroglycerin (NITROSTAT) 0.4 MG SL tablet [NITROGLYCERIN (NITROSTAT) 0.4 MG SL TABLET] Place 1 tablet (0.4 mg total) under the tongue every 5 (five) minutes as needed for chest pain. 100 tablet 3     omeprazole (PRILOSEC) 20 MG capsule [OMEPRAZOLE (PRILOSEC) 20 MG CAPSULE] TAKE 1 CAPSULE AS NEEDED 90 capsule 2     oxymetazoline (AFRIN) 0.05 % nasal spray [OXYMETAZOLINE (AFRIN) 0.05 % NASAL SPRAY] Apply 2 sprays into each nostril daily as needed for congestion.       psyllium (METAMUCIL) 3.4 gram packet [PSYLLIUM (METAMUCIL) 3.4 GRAM PACKET] Take 1 packet by mouth 2 (two) times a day.       sildenafiL (VIAGRA) 50 MG tablet [SILDENAFIL (VIAGRA) 50 MG TABLET] Take 1 tablet (50 mg total) by mouth as needed. 30 tablet 11     simvastatin (ZOCOR) 40 MG tablet Take 1 tablet (40 mg) by mouth At Bedtime 90 tablet 3     tadalafiL (CIALIS) 10 MG tablet [TADALAFIL (CIALIS) 10 MG TABLET] Take 1 tablet (10 mg total) by mouth daily as needed for erectile dysfunction. 30 tablet 11     traMADol (ULTRAM) 50 mg tablet [TRAMADOL (ULTRAM) 50 MG TABLET] Take 1-2 tablets ( mg total) by mouth every 6 (six) hours as needed for pain. Take 1-2 tablets every 6 hours as needed for pain 15 tablet 1     traZODone (DESYREL) 50 MG tablet [TRAZODONE (DESYREL) 50 MG TABLET] TAKE 1 TABLET AT BEDTIME 90 tablet 3          Objective:    Vitals:    06/15/22 1127   BP: 120/70   Pulse: 91   SpO2: 95%   Weight: 105.7 kg (233 lb)      Body mass index is 33.19 kg/m .    Alert.  No apparent distress.  Cardiac exam regular without cardiac ectopy or significant murmur.  Chest clear.  Extremities warm and dry.      This note has been dictated using voice recognition software and as a result may contain minor grammatical errors and unintended word substitutions.         Answers for HPI/ROS submitted by the patient on 6/8/2022  What is the reason for your  visit today? : General health check up.  How many servings of fruits and vegetables do you eat daily?: 2-3  On average, how many sweetened beverages do you drink each day (Examples: soda, juice, sweet tea, etc.  Do NOT count diet or artificially sweetened beverages)?: 0  How many minutes a day do you exercise enough to make your heart beat faster?: 10 to 19  How many days a week do you exercise enough to make your heart beat faster?: 3 or less  How many days per week do you miss taking your medication?: 0

## 2022-06-16 LAB — LPA SERPL-MCNC: 67 MG/DL

## 2022-06-27 ENCOUNTER — MYC MEDICAL ADVICE (OUTPATIENT)
Dept: FAMILY MEDICINE | Facility: CLINIC | Age: 77
End: 2022-06-27

## 2022-06-27 DIAGNOSIS — N52.9 ERECTILE DYSFUNCTION, UNSPECIFIED ERECTILE DYSFUNCTION TYPE: ICD-10-CM

## 2022-06-28 NOTE — TELEPHONE ENCOUNTER
"Routing refill request to provider for review/approval because:  Request needs review    Last Written Prescription Date:  10/27/20  Last Fill Quantity: 30,  # refills: 11   Last office visit provider:  6/15/22     Requested Prescriptions   Pending Prescriptions Disp Refills     tadalafil (CIALIS) 10 MG tablet 30 tablet 11     Sig: Take 1 tablet (10 mg) by mouth daily as needed       Erectile Dysfuction Protocol Failed - 6/28/2022  7:40 AM        Failed - Absence of nitrates on medication list        Passed - Absence of Alpha Blockers on Med list        Passed - Recent (12 mo) or future (30 days) visit within the authorizing provider's specialty     Patient has had an office visit with the authorizing provider or a provider within the authorizing providers department within the previous 12 mos or has a future within next 30 days. See \"Patient Info\" tab in inbasket, or \"Choose Columns\" in Meds & Orders section of the refill encounter.              Passed - Medication is active on med list        Passed - Patient is age 18 or older             Hiral Ramos RN 06/28/22 6:47 PM  "

## 2022-06-29 RX ORDER — TADALAFIL 10 MG/1
10 TABLET ORAL DAILY PRN
Qty: 30 TABLET | Refills: 11 | Status: SHIPPED | OUTPATIENT
Start: 2022-06-29 | End: 2022-07-18

## 2022-07-08 DIAGNOSIS — E78.5 HYPERLIPIDEMIA LDL GOAL <70: ICD-10-CM

## 2022-07-08 NOTE — TELEPHONE ENCOUNTER
Sent MyChart to patient advising him to contact his pharmacy about further questions.    Savanah Suarez RN on 7/8/2022 at 8:07 AM

## 2022-07-11 RX ORDER — SIMVASTATIN 40 MG
TABLET ORAL
Qty: 90 TABLET | Refills: 3 | Status: SHIPPED | OUTPATIENT
Start: 2022-07-11 | End: 2023-05-17

## 2022-07-14 ENCOUNTER — IMMUNIZATION (OUTPATIENT)
Dept: NURSING | Facility: CLINIC | Age: 77
End: 2022-07-14
Payer: COMMERCIAL

## 2022-07-14 PROCEDURE — 91305 COVID-19,PF,PFIZER (12+ YRS): CPT

## 2022-07-14 PROCEDURE — 0054A COVID-19,PF,PFIZER (12+ YRS): CPT

## 2022-07-15 NOTE — TELEPHONE ENCOUNTER
Patient requesting paper copy of his prescription for Tadalafil.     Please advise.    Thank you!    Savanah Suarez RN on 7/15/2022 at 12:54 PM

## 2022-07-18 RX ORDER — TADALAFIL 10 MG/1
10 TABLET ORAL DAILY PRN
Qty: 30 TABLET | Refills: 11 | Status: SHIPPED | OUTPATIENT
Start: 2022-07-18 | End: 2023-06-07

## 2022-07-29 ENCOUNTER — ANCILLARY PROCEDURE (OUTPATIENT)
Dept: CARDIOLOGY | Facility: CLINIC | Age: 77
End: 2022-07-29
Attending: INTERNAL MEDICINE
Payer: COMMERCIAL

## 2022-07-29 DIAGNOSIS — Z95.810 ICD (IMPLANTABLE CARDIOVERTER-DEFIBRILLATOR) IN PLACE: ICD-10-CM

## 2022-07-29 DIAGNOSIS — I25.5 ISCHEMIC CARDIOMYOPATHY: ICD-10-CM

## 2022-07-29 DIAGNOSIS — I50.9 CHF (CONGESTIVE HEART FAILURE) (H): ICD-10-CM

## 2022-07-29 LAB
MDC_IDC_EPISODE_DTM: NORMAL
MDC_IDC_EPISODE_ID: NORMAL
MDC_IDC_EPISODE_TYPE: NORMAL
MDC_IDC_LEAD_IMPLANT_DT: NORMAL
MDC_IDC_LEAD_LOCATION: NORMAL
MDC_IDC_LEAD_LOCATION_DETAIL_1: NORMAL
MDC_IDC_LEAD_MFG: NORMAL
MDC_IDC_LEAD_MODEL: NORMAL
MDC_IDC_LEAD_POLARITY_TYPE: NORMAL
MDC_IDC_LEAD_SERIAL: NORMAL
MDC_IDC_LEAD_SPECIAL_FUNCTION: NORMAL
MDC_IDC_MSMT_BATTERY_DTM: NORMAL
MDC_IDC_MSMT_BATTERY_REMAINING_LONGEVITY: 42 MO
MDC_IDC_MSMT_BATTERY_REMAINING_PERCENTAGE: 63 %
MDC_IDC_MSMT_BATTERY_STATUS: NORMAL
MDC_IDC_MSMT_CAP_CHARGE_DTM: NORMAL
MDC_IDC_MSMT_CAP_CHARGE_DTM: NORMAL
MDC_IDC_MSMT_CAP_CHARGE_ENERGY: 41 J
MDC_IDC_MSMT_CAP_CHARGE_TIME: 11.9 S
MDC_IDC_MSMT_CAP_CHARGE_TIME: 8.8 S
MDC_IDC_MSMT_CAP_CHARGE_TYPE: NORMAL
MDC_IDC_MSMT_CAP_CHARGE_TYPE: NORMAL
MDC_IDC_MSMT_LEADCHNL_LV_IMPEDANCE_VALUE: 853 OHM
MDC_IDC_MSMT_LEADCHNL_LV_PACING_THRESHOLD_AMPLITUDE: 1 V
MDC_IDC_MSMT_LEADCHNL_LV_PACING_THRESHOLD_PULSEWIDTH: 0.5 MS
MDC_IDC_MSMT_LEADCHNL_RA_IMPEDANCE_VALUE: 386 OHM
MDC_IDC_MSMT_LEADCHNL_RA_PACING_THRESHOLD_AMPLITUDE: 0.6 V
MDC_IDC_MSMT_LEADCHNL_RA_PACING_THRESHOLD_PULSEWIDTH: 0.5 MS
MDC_IDC_MSMT_LEADCHNL_RV_IMPEDANCE_VALUE: 400 OHM
MDC_IDC_MSMT_LEADCHNL_RV_PACING_THRESHOLD_AMPLITUDE: 1.1 V
MDC_IDC_MSMT_LEADCHNL_RV_PACING_THRESHOLD_PULSEWIDTH: 0.5 MS
MDC_IDC_PG_IMPLANT_DTM: NORMAL
MDC_IDC_PG_MFG: NORMAL
MDC_IDC_PG_MODEL: NORMAL
MDC_IDC_PG_SERIAL: NORMAL
MDC_IDC_PG_TYPE: NORMAL
MDC_IDC_SESS_CLINIC_NAME: NORMAL
MDC_IDC_SESS_DTM: NORMAL
MDC_IDC_SESS_TYPE: NORMAL
MDC_IDC_SET_BRADY_AT_MODE_SWITCH_MODE: NORMAL
MDC_IDC_SET_BRADY_AT_MODE_SWITCH_RATE: 170 {BEATS}/MIN
MDC_IDC_SET_BRADY_LOWRATE: 60 {BEATS}/MIN
MDC_IDC_SET_BRADY_MAX_SENSOR_RATE: 130 {BEATS}/MIN
MDC_IDC_SET_BRADY_MAX_TRACKING_RATE: 140 {BEATS}/MIN
MDC_IDC_SET_BRADY_MODE: NORMAL
MDC_IDC_SET_BRADY_PAV_DELAY_LOW: 180 MS
MDC_IDC_SET_BRADY_SAV_DELAY_LOW: 150 MS
MDC_IDC_SET_CRT_LVRV_DELAY: 0 MS
MDC_IDC_SET_CRT_PACED_CHAMBERS: NORMAL
MDC_IDC_SET_LEADCHNL_LV_PACING_AMPLITUDE: 2 V
MDC_IDC_SET_LEADCHNL_LV_PACING_ANODE_ELECTRODE_1: NORMAL
MDC_IDC_SET_LEADCHNL_LV_PACING_ANODE_LOCATION_1: NORMAL
MDC_IDC_SET_LEADCHNL_LV_PACING_CATHODE_ELECTRODE_1: NORMAL
MDC_IDC_SET_LEADCHNL_LV_PACING_CATHODE_LOCATION_1: NORMAL
MDC_IDC_SET_LEADCHNL_LV_PACING_PULSEWIDTH: 0.5 MS
MDC_IDC_SET_LEADCHNL_LV_SENSING_ADAPTATION_MODE: NORMAL
MDC_IDC_SET_LEADCHNL_LV_SENSING_ANODE_ELECTRODE_1: NORMAL
MDC_IDC_SET_LEADCHNL_LV_SENSING_ANODE_LOCATION_1: NORMAL
MDC_IDC_SET_LEADCHNL_LV_SENSING_CATHODE_ELECTRODE_1: NORMAL
MDC_IDC_SET_LEADCHNL_LV_SENSING_CATHODE_LOCATION_1: NORMAL
MDC_IDC_SET_LEADCHNL_LV_SENSING_SENSITIVITY: 1 MV
MDC_IDC_SET_LEADCHNL_RA_PACING_AMPLITUDE: 1.2 V
MDC_IDC_SET_LEADCHNL_RA_PACING_POLARITY: NORMAL
MDC_IDC_SET_LEADCHNL_RA_PACING_PULSEWIDTH: 0.5 MS
MDC_IDC_SET_LEADCHNL_RA_SENSING_ADAPTATION_MODE: NORMAL
MDC_IDC_SET_LEADCHNL_RA_SENSING_POLARITY: NORMAL
MDC_IDC_SET_LEADCHNL_RA_SENSING_SENSITIVITY: 0.25 MV
MDC_IDC_SET_LEADCHNL_RV_PACING_AMPLITUDE: 2 V
MDC_IDC_SET_LEADCHNL_RV_PACING_POLARITY: NORMAL
MDC_IDC_SET_LEADCHNL_RV_PACING_PULSEWIDTH: 0.5 MS
MDC_IDC_SET_LEADCHNL_RV_SENSING_ADAPTATION_MODE: NORMAL
MDC_IDC_SET_LEADCHNL_RV_SENSING_POLARITY: NORMAL
MDC_IDC_SET_LEADCHNL_RV_SENSING_SENSITIVITY: 0.6 MV
MDC_IDC_SET_ZONE_DETECTION_INTERVAL: 300 MS
MDC_IDC_SET_ZONE_DETECTION_INTERVAL: 353 MS
MDC_IDC_SET_ZONE_TYPE: NORMAL
MDC_IDC_SET_ZONE_TYPE: NORMAL
MDC_IDC_SET_ZONE_VENDOR_TYPE: NORMAL
MDC_IDC_SET_ZONE_VENDOR_TYPE: NORMAL
MDC_IDC_STAT_AT_BURDEN_PERCENT: 0 %
MDC_IDC_STAT_AT_DTM_END: NORMAL
MDC_IDC_STAT_AT_DTM_START: NORMAL
MDC_IDC_STAT_BRADY_DTM_END: NORMAL
MDC_IDC_STAT_BRADY_DTM_START: NORMAL
MDC_IDC_STAT_BRADY_RA_PERCENT_PACED: 17 %
MDC_IDC_STAT_BRADY_RV_PERCENT_PACED: 94 %
MDC_IDC_STAT_CRT_DTM_END: NORMAL
MDC_IDC_STAT_CRT_DTM_START: NORMAL
MDC_IDC_STAT_CRT_LV_PERCENT_PACED: 93 %
MDC_IDC_STAT_EPISODE_RECENT_COUNT: 0
MDC_IDC_STAT_EPISODE_RECENT_COUNT_DTM_END: NORMAL
MDC_IDC_STAT_EPISODE_RECENT_COUNT_DTM_START: NORMAL
MDC_IDC_STAT_EPISODE_TYPE: NORMAL
MDC_IDC_STAT_EPISODE_VENDOR_TYPE: NORMAL
MDC_IDC_STAT_TACHYTHERAPY_ATP_DELIVERED_RECENT: 0
MDC_IDC_STAT_TACHYTHERAPY_ATP_DELIVERED_TOTAL: 1
MDC_IDC_STAT_TACHYTHERAPY_RECENT_DTM_END: NORMAL
MDC_IDC_STAT_TACHYTHERAPY_RECENT_DTM_START: NORMAL
MDC_IDC_STAT_TACHYTHERAPY_SHOCKS_ABORTED_RECENT: 0
MDC_IDC_STAT_TACHYTHERAPY_SHOCKS_ABORTED_TOTAL: 1
MDC_IDC_STAT_TACHYTHERAPY_SHOCKS_DELIVERED_RECENT: 0
MDC_IDC_STAT_TACHYTHERAPY_SHOCKS_DELIVERED_TOTAL: 2
MDC_IDC_STAT_TACHYTHERAPY_TOTAL_DTM_END: NORMAL
MDC_IDC_STAT_TACHYTHERAPY_TOTAL_DTM_START: NORMAL

## 2022-07-29 PROCEDURE — 93296 REM INTERROG EVL PM/IDS: CPT | Performed by: INTERNAL MEDICINE

## 2022-07-29 PROCEDURE — 93295 DEV INTERROG REMOTE 1/2/MLT: CPT | Performed by: INTERNAL MEDICINE

## 2022-09-25 ENCOUNTER — HEALTH MAINTENANCE LETTER (OUTPATIENT)
Age: 77
End: 2022-09-25

## 2022-11-03 ENCOUNTER — ANCILLARY PROCEDURE (OUTPATIENT)
Dept: CARDIOLOGY | Facility: CLINIC | Age: 77
End: 2022-11-03
Attending: INTERNAL MEDICINE
Payer: COMMERCIAL

## 2022-11-03 DIAGNOSIS — I44.2 THIRD DEGREE AV BLOCK (H): ICD-10-CM

## 2022-11-03 DIAGNOSIS — Z95.810 ICD (IMPLANTABLE CARDIOVERTER-DEFIBRILLATOR), BIVENTRICULAR, IN SITU: ICD-10-CM

## 2022-11-03 DIAGNOSIS — I50.9 CONGESTIVE HEART FAILURE (CHF) (H): ICD-10-CM

## 2022-11-03 PROCEDURE — 93295 DEV INTERROG REMOTE 1/2/MLT: CPT | Performed by: INTERNAL MEDICINE

## 2022-11-03 PROCEDURE — 93296 REM INTERROG EVL PM/IDS: CPT | Performed by: INTERNAL MEDICINE

## 2022-11-24 DIAGNOSIS — K21.9 GERD (GASTROESOPHAGEAL REFLUX DISEASE): ICD-10-CM

## 2022-11-24 DIAGNOSIS — I42.9 CARDIOMYOPATHY (H): ICD-10-CM

## 2022-11-24 DIAGNOSIS — I10 ESSENTIAL HYPERTENSION: ICD-10-CM

## 2022-11-25 LAB
MDC_IDC_EPISODE_DTM: NORMAL
MDC_IDC_EPISODE_ID: NORMAL
MDC_IDC_EPISODE_TYPE: NORMAL
MDC_IDC_LEAD_IMPLANT_DT: NORMAL
MDC_IDC_LEAD_LOCATION: NORMAL
MDC_IDC_LEAD_LOCATION_DETAIL_1: NORMAL
MDC_IDC_LEAD_MFG: NORMAL
MDC_IDC_LEAD_MODEL: NORMAL
MDC_IDC_LEAD_POLARITY_TYPE: NORMAL
MDC_IDC_LEAD_SERIAL: NORMAL
MDC_IDC_LEAD_SPECIAL_FUNCTION: NORMAL
MDC_IDC_MSMT_BATTERY_DTM: NORMAL
MDC_IDC_MSMT_BATTERY_REMAINING_LONGEVITY: 42 MO
MDC_IDC_MSMT_BATTERY_REMAINING_PERCENTAGE: 58 %
MDC_IDC_MSMT_BATTERY_STATUS: NORMAL
MDC_IDC_MSMT_CAP_CHARGE_DTM: NORMAL
MDC_IDC_MSMT_CAP_CHARGE_DTM: NORMAL
MDC_IDC_MSMT_CAP_CHARGE_ENERGY: 41 J
MDC_IDC_MSMT_CAP_CHARGE_TIME: 12.2 S
MDC_IDC_MSMT_CAP_CHARGE_TIME: 8.8 S
MDC_IDC_MSMT_CAP_CHARGE_TYPE: NORMAL
MDC_IDC_MSMT_CAP_CHARGE_TYPE: NORMAL
MDC_IDC_MSMT_LEADCHNL_LV_IMPEDANCE_VALUE: 784 OHM
MDC_IDC_MSMT_LEADCHNL_LV_PACING_THRESHOLD_AMPLITUDE: 1 V
MDC_IDC_MSMT_LEADCHNL_LV_PACING_THRESHOLD_PULSEWIDTH: 0.5 MS
MDC_IDC_MSMT_LEADCHNL_RA_IMPEDANCE_VALUE: 376 OHM
MDC_IDC_MSMT_LEADCHNL_RA_PACING_THRESHOLD_AMPLITUDE: 0.6 V
MDC_IDC_MSMT_LEADCHNL_RA_PACING_THRESHOLD_PULSEWIDTH: 0.5 MS
MDC_IDC_MSMT_LEADCHNL_RV_IMPEDANCE_VALUE: 381 OHM
MDC_IDC_MSMT_LEADCHNL_RV_PACING_THRESHOLD_AMPLITUDE: 1.1 V
MDC_IDC_MSMT_LEADCHNL_RV_PACING_THRESHOLD_PULSEWIDTH: 0.5 MS
MDC_IDC_PG_IMPLANT_DTM: NORMAL
MDC_IDC_PG_MFG: NORMAL
MDC_IDC_PG_MODEL: NORMAL
MDC_IDC_PG_SERIAL: NORMAL
MDC_IDC_PG_TYPE: NORMAL
MDC_IDC_SESS_CLINIC_NAME: NORMAL
MDC_IDC_SESS_DTM: NORMAL
MDC_IDC_SESS_TYPE: NORMAL
MDC_IDC_SET_BRADY_AT_MODE_SWITCH_MODE: NORMAL
MDC_IDC_SET_BRADY_AT_MODE_SWITCH_RATE: 170 {BEATS}/MIN
MDC_IDC_SET_BRADY_LOWRATE: 60 {BEATS}/MIN
MDC_IDC_SET_BRADY_MAX_SENSOR_RATE: 130 {BEATS}/MIN
MDC_IDC_SET_BRADY_MAX_TRACKING_RATE: 140 {BEATS}/MIN
MDC_IDC_SET_BRADY_MODE: NORMAL
MDC_IDC_SET_BRADY_PAV_DELAY_LOW: 180 MS
MDC_IDC_SET_BRADY_SAV_DELAY_LOW: 150 MS
MDC_IDC_SET_CRT_LVRV_DELAY: 0 MS
MDC_IDC_SET_CRT_PACED_CHAMBERS: NORMAL
MDC_IDC_SET_LEADCHNL_LV_PACING_AMPLITUDE: 2 V
MDC_IDC_SET_LEADCHNL_LV_PACING_ANODE_ELECTRODE_1: NORMAL
MDC_IDC_SET_LEADCHNL_LV_PACING_ANODE_LOCATION_1: NORMAL
MDC_IDC_SET_LEADCHNL_LV_PACING_CATHODE_ELECTRODE_1: NORMAL
MDC_IDC_SET_LEADCHNL_LV_PACING_CATHODE_LOCATION_1: NORMAL
MDC_IDC_SET_LEADCHNL_LV_PACING_PULSEWIDTH: 0.5 MS
MDC_IDC_SET_LEADCHNL_LV_SENSING_ADAPTATION_MODE: NORMAL
MDC_IDC_SET_LEADCHNL_LV_SENSING_ANODE_ELECTRODE_1: NORMAL
MDC_IDC_SET_LEADCHNL_LV_SENSING_ANODE_LOCATION_1: NORMAL
MDC_IDC_SET_LEADCHNL_LV_SENSING_CATHODE_ELECTRODE_1: NORMAL
MDC_IDC_SET_LEADCHNL_LV_SENSING_CATHODE_LOCATION_1: NORMAL
MDC_IDC_SET_LEADCHNL_LV_SENSING_SENSITIVITY: 1 MV
MDC_IDC_SET_LEADCHNL_RA_PACING_AMPLITUDE: 1.2 V
MDC_IDC_SET_LEADCHNL_RA_PACING_POLARITY: NORMAL
MDC_IDC_SET_LEADCHNL_RA_PACING_PULSEWIDTH: 0.5 MS
MDC_IDC_SET_LEADCHNL_RA_SENSING_ADAPTATION_MODE: NORMAL
MDC_IDC_SET_LEADCHNL_RA_SENSING_POLARITY: NORMAL
MDC_IDC_SET_LEADCHNL_RA_SENSING_SENSITIVITY: 0.25 MV
MDC_IDC_SET_LEADCHNL_RV_PACING_AMPLITUDE: 2 V
MDC_IDC_SET_LEADCHNL_RV_PACING_POLARITY: NORMAL
MDC_IDC_SET_LEADCHNL_RV_PACING_PULSEWIDTH: 0.5 MS
MDC_IDC_SET_LEADCHNL_RV_SENSING_ADAPTATION_MODE: NORMAL
MDC_IDC_SET_LEADCHNL_RV_SENSING_POLARITY: NORMAL
MDC_IDC_SET_LEADCHNL_RV_SENSING_SENSITIVITY: 0.6 MV
MDC_IDC_SET_ZONE_DETECTION_INTERVAL: 300 MS
MDC_IDC_SET_ZONE_DETECTION_INTERVAL: 353 MS
MDC_IDC_SET_ZONE_TYPE: NORMAL
MDC_IDC_SET_ZONE_TYPE: NORMAL
MDC_IDC_SET_ZONE_VENDOR_TYPE: NORMAL
MDC_IDC_SET_ZONE_VENDOR_TYPE: NORMAL
MDC_IDC_STAT_AT_BURDEN_PERCENT: 0 %
MDC_IDC_STAT_AT_DTM_END: NORMAL
MDC_IDC_STAT_AT_DTM_START: NORMAL
MDC_IDC_STAT_BRADY_DTM_END: NORMAL
MDC_IDC_STAT_BRADY_DTM_START: NORMAL
MDC_IDC_STAT_BRADY_RA_PERCENT_PACED: 15 %
MDC_IDC_STAT_BRADY_RV_PERCENT_PACED: 96 %
MDC_IDC_STAT_CRT_DTM_END: NORMAL
MDC_IDC_STAT_CRT_DTM_START: NORMAL
MDC_IDC_STAT_CRT_LV_PERCENT_PACED: 96 %
MDC_IDC_STAT_EPISODE_RECENT_COUNT: 0
MDC_IDC_STAT_EPISODE_RECENT_COUNT_DTM_END: NORMAL
MDC_IDC_STAT_EPISODE_RECENT_COUNT_DTM_START: NORMAL
MDC_IDC_STAT_EPISODE_TYPE: NORMAL
MDC_IDC_STAT_EPISODE_VENDOR_TYPE: NORMAL
MDC_IDC_STAT_TACHYTHERAPY_ATP_DELIVERED_RECENT: 0
MDC_IDC_STAT_TACHYTHERAPY_ATP_DELIVERED_TOTAL: 1
MDC_IDC_STAT_TACHYTHERAPY_RECENT_DTM_END: NORMAL
MDC_IDC_STAT_TACHYTHERAPY_RECENT_DTM_START: NORMAL
MDC_IDC_STAT_TACHYTHERAPY_SHOCKS_ABORTED_RECENT: 0
MDC_IDC_STAT_TACHYTHERAPY_SHOCKS_ABORTED_TOTAL: 1
MDC_IDC_STAT_TACHYTHERAPY_SHOCKS_DELIVERED_RECENT: 0
MDC_IDC_STAT_TACHYTHERAPY_SHOCKS_DELIVERED_TOTAL: 2
MDC_IDC_STAT_TACHYTHERAPY_TOTAL_DTM_END: NORMAL
MDC_IDC_STAT_TACHYTHERAPY_TOTAL_DTM_START: NORMAL

## 2022-11-25 RX ORDER — LISINOPRIL 5 MG/1
5 TABLET ORAL DAILY
Qty: 90 TABLET | Refills: 2 | Status: SHIPPED | OUTPATIENT
Start: 2022-11-25 | End: 2023-07-12

## 2022-11-25 NOTE — TELEPHONE ENCOUNTER
"Last Written Prescription Date:  12/1/21  Last Fill Quantity: 90,  # refills: 3   Last office visit provider:  6/15/22     Requested Prescriptions   Pending Prescriptions Disp Refills     lisinopril (ZESTRIL) 5 MG tablet [Pharmacy Med Name: LISINOPRIL TABS 5MG] 90 tablet 3     Sig: TAKE 1 TABLET DAILY       ACE Inhibitors (Including Combos) Protocol Passed - 11/25/2022  8:05 AM        Passed - Blood pressure under 140/90 in past 12 months     BP Readings from Last 3 Encounters:   06/15/22 120/70   12/15/21 130/70   06/15/21 130/70                 Passed - Recent (12 mo) or future (30 days) visit within the authorizing provider's specialty     Patient has had an office visit with the authorizing provider or a provider within the authorizing providers department within the previous 12 mos or has a future within next 30 days. See \"Patient Info\" tab in inbasket, or \"Choose Columns\" in Meds & Orders section of the refill encounter.              Passed - Medication is active on med list        Passed - Patient is age 18 or older        Passed - Normal serum creatinine on file in past 12 months     Recent Labs   Lab Test 06/15/22  1223   CR 0.80       Ok to refill medication if creatinine is low          Passed - Normal serum potassium on file in past 12 months     Recent Labs   Lab Test 06/15/22  1223   POTASSIUM 4.1                omeprazole (PRILOSEC) 20 MG DR capsule [Pharmacy Med Name: OMEPRAZOLE DR CAPS 20MG] 90 capsule 3     Sig: TAKE 1 CAPSULE AS NEEDED       PPI Protocol Passed - 11/24/2022  2:30 AM        Passed - Not on Clopidogrel (unless Pantoprazole ordered)        Passed - No diagnosis of osteoporosis on record        Passed - Recent (12 mo) or future (30 days) visit within the authorizing provider's specialty     Patient has had an office visit with the authorizing provider or a provider within the authorizing providers department within the previous 12 mos or has a future within next 30 days. See \"Patient " "Info\" tab in inbasket, or \"Choose Columns\" in Meds & Orders section of the refill encounter.              Passed - Medication is active on med list        Passed - Patient is age 18 or older             Simon Graff RN 11/25/22 8:06 AM  "

## 2022-11-25 NOTE — TELEPHONE ENCOUNTER
"Routing refill request to provider for review/approval because:  A break in medication    Last Written Prescription Date:  10/4/20  Last Fill Quantity: 90,  # refills: 2   Last office visit provider:  6/15/22     Requested Prescriptions   Pending Prescriptions Disp Refills     omeprazole (PRILOSEC) 20 MG DR capsule [Pharmacy Med Name: OMEPRAZOLE DR CAPS 20MG] 90 capsule 3     Sig: TAKE 1 CAPSULE AS NEEDED       PPI Protocol Passed - 11/24/2022  2:30 AM        Passed - Not on Clopidogrel (unless Pantoprazole ordered)        Passed - No diagnosis of osteoporosis on record        Passed - Recent (12 mo) or future (30 days) visit within the authorizing provider's specialty     Patient has had an office visit with the authorizing provider or a provider within the authorizing providers department within the previous 12 mos or has a future within next 30 days. See \"Patient Info\" tab in inbasket, or \"Choose Columns\" in Meds & Orders section of the refill encounter.              Passed - Medication is active on med list        Passed - Patient is age 18 or older         Signed Prescriptions Disp Refills    lisinopril (ZESTRIL) 5 MG tablet 90 tablet 2     Sig: Take 1 tablet (5 mg) by mouth daily       ACE Inhibitors (Including Combos) Protocol Passed - 11/25/2022  8:05 AM        Passed - Blood pressure under 140/90 in past 12 months     BP Readings from Last 3 Encounters:   06/15/22 120/70   12/15/21 130/70   06/15/21 130/70                 Passed - Recent (12 mo) or future (30 days) visit within the authorizing provider's specialty     Patient has had an office visit with the authorizing provider or a provider within the authorizing providers department within the previous 12 mos or has a future within next 30 days. See \"Patient Info\" tab in inbasket, or \"Choose Columns\" in Meds & Orders section of the refill encounter.              Passed - Medication is active on med list        Passed - Patient is age 18 or older        " Passed - Normal serum creatinine on file in past 12 months     Recent Labs   Lab Test 06/15/22  1223   CR 0.80       Ok to refill medication if creatinine is low          Passed - Normal serum potassium on file in past 12 months     Recent Labs   Lab Test 06/15/22  1223   POTASSIUM 4.1                  Simon Graff RN 11/25/22 8:07 AM

## 2023-01-30 ENCOUNTER — HEALTH MAINTENANCE LETTER (OUTPATIENT)
Age: 78
End: 2023-01-30

## 2023-02-10 ENCOUNTER — ANCILLARY PROCEDURE (OUTPATIENT)
Dept: CARDIOLOGY | Facility: CLINIC | Age: 78
End: 2023-02-10
Attending: INTERNAL MEDICINE
Payer: COMMERCIAL

## 2023-02-10 DIAGNOSIS — I50.9 CONGESTIVE HEART FAILURE (CHF) (H): ICD-10-CM

## 2023-02-10 DIAGNOSIS — Z95.810 ICD (IMPLANTABLE CARDIOVERTER-DEFIBRILLATOR), BIVENTRICULAR, IN SITU: ICD-10-CM

## 2023-02-10 DIAGNOSIS — I44.2 THIRD DEGREE AV BLOCK (H): ICD-10-CM

## 2023-02-14 DIAGNOSIS — Z95.810 ICD (IMPLANTABLE CARDIOVERTER-DEFIBRILLATOR), BIVENTRICULAR, IN SITU: Primary | ICD-10-CM

## 2023-02-14 DIAGNOSIS — I44.2 THIRD DEGREE AV BLOCK (H): ICD-10-CM

## 2023-02-14 DIAGNOSIS — I50.9 CONGESTIVE HEART FAILURE (CHF) (H): ICD-10-CM

## 2023-02-21 ENCOUNTER — TRANSFERRED RECORDS (OUTPATIENT)
Dept: HEALTH INFORMATION MANAGEMENT | Facility: CLINIC | Age: 78
End: 2023-02-21
Payer: COMMERCIAL

## 2023-03-03 LAB
MDC_IDC_EPISODE_DTM: NORMAL
MDC_IDC_EPISODE_ID: NORMAL
MDC_IDC_EPISODE_TYPE: NORMAL
MDC_IDC_LEAD_IMPLANT_DT: NORMAL
MDC_IDC_LEAD_LOCATION: NORMAL
MDC_IDC_LEAD_LOCATION_DETAIL_1: NORMAL
MDC_IDC_LEAD_MFG: NORMAL
MDC_IDC_LEAD_MODEL: NORMAL
MDC_IDC_LEAD_POLARITY_TYPE: NORMAL
MDC_IDC_LEAD_SERIAL: NORMAL
MDC_IDC_LEAD_SPECIAL_FUNCTION: NORMAL
MDC_IDC_MSMT_BATTERY_DTM: NORMAL
MDC_IDC_MSMT_BATTERY_REMAINING_LONGEVITY: 42 MO
MDC_IDC_MSMT_BATTERY_REMAINING_PERCENTAGE: 56 %
MDC_IDC_MSMT_BATTERY_STATUS: NORMAL
MDC_IDC_MSMT_CAP_CHARGE_DTM: NORMAL
MDC_IDC_MSMT_CAP_CHARGE_DTM: NORMAL
MDC_IDC_MSMT_CAP_CHARGE_ENERGY: 41 J
MDC_IDC_MSMT_CAP_CHARGE_TIME: 12.2 S
MDC_IDC_MSMT_CAP_CHARGE_TIME: 8.8 S
MDC_IDC_MSMT_CAP_CHARGE_TYPE: NORMAL
MDC_IDC_MSMT_CAP_CHARGE_TYPE: NORMAL
MDC_IDC_MSMT_LEADCHNL_LV_IMPEDANCE_VALUE: 800 OHM
MDC_IDC_MSMT_LEADCHNL_LV_PACING_THRESHOLD_AMPLITUDE: 1 V
MDC_IDC_MSMT_LEADCHNL_LV_PACING_THRESHOLD_PULSEWIDTH: 0.5 MS
MDC_IDC_MSMT_LEADCHNL_RA_IMPEDANCE_VALUE: 376 OHM
MDC_IDC_MSMT_LEADCHNL_RA_PACING_THRESHOLD_AMPLITUDE: 0.6 V
MDC_IDC_MSMT_LEADCHNL_RA_PACING_THRESHOLD_PULSEWIDTH: 0.5 MS
MDC_IDC_MSMT_LEADCHNL_RV_IMPEDANCE_VALUE: 380 OHM
MDC_IDC_MSMT_LEADCHNL_RV_PACING_THRESHOLD_AMPLITUDE: 1.1 V
MDC_IDC_MSMT_LEADCHNL_RV_PACING_THRESHOLD_PULSEWIDTH: 0.5 MS
MDC_IDC_PG_IMPLANT_DTM: NORMAL
MDC_IDC_PG_MFG: NORMAL
MDC_IDC_PG_MODEL: NORMAL
MDC_IDC_PG_SERIAL: NORMAL
MDC_IDC_PG_TYPE: NORMAL
MDC_IDC_SESS_CLINIC_NAME: NORMAL
MDC_IDC_SESS_DTM: NORMAL
MDC_IDC_SESS_TYPE: NORMAL
MDC_IDC_SET_BRADY_AT_MODE_SWITCH_MODE: NORMAL
MDC_IDC_SET_BRADY_AT_MODE_SWITCH_RATE: 170 {BEATS}/MIN
MDC_IDC_SET_BRADY_LOWRATE: 60 {BEATS}/MIN
MDC_IDC_SET_BRADY_MAX_SENSOR_RATE: 130 {BEATS}/MIN
MDC_IDC_SET_BRADY_MAX_TRACKING_RATE: 140 {BEATS}/MIN
MDC_IDC_SET_BRADY_MODE: NORMAL
MDC_IDC_SET_BRADY_PAV_DELAY_LOW: 180 MS
MDC_IDC_SET_BRADY_SAV_DELAY_LOW: 150 MS
MDC_IDC_SET_CRT_LVRV_DELAY: 0 MS
MDC_IDC_SET_CRT_PACED_CHAMBERS: NORMAL
MDC_IDC_SET_LEADCHNL_LV_PACING_AMPLITUDE: 2 V
MDC_IDC_SET_LEADCHNL_LV_PACING_ANODE_ELECTRODE_1: NORMAL
MDC_IDC_SET_LEADCHNL_LV_PACING_ANODE_LOCATION_1: NORMAL
MDC_IDC_SET_LEADCHNL_LV_PACING_CATHODE_ELECTRODE_1: NORMAL
MDC_IDC_SET_LEADCHNL_LV_PACING_CATHODE_LOCATION_1: NORMAL
MDC_IDC_SET_LEADCHNL_LV_PACING_PULSEWIDTH: 0.5 MS
MDC_IDC_SET_LEADCHNL_LV_SENSING_ADAPTATION_MODE: NORMAL
MDC_IDC_SET_LEADCHNL_LV_SENSING_ANODE_ELECTRODE_1: NORMAL
MDC_IDC_SET_LEADCHNL_LV_SENSING_ANODE_LOCATION_1: NORMAL
MDC_IDC_SET_LEADCHNL_LV_SENSING_CATHODE_ELECTRODE_1: NORMAL
MDC_IDC_SET_LEADCHNL_LV_SENSING_CATHODE_LOCATION_1: NORMAL
MDC_IDC_SET_LEADCHNL_LV_SENSING_SENSITIVITY: 1 MV
MDC_IDC_SET_LEADCHNL_RA_PACING_AMPLITUDE: 1.2 V
MDC_IDC_SET_LEADCHNL_RA_PACING_POLARITY: NORMAL
MDC_IDC_SET_LEADCHNL_RA_PACING_PULSEWIDTH: 0.5 MS
MDC_IDC_SET_LEADCHNL_RA_SENSING_ADAPTATION_MODE: NORMAL
MDC_IDC_SET_LEADCHNL_RA_SENSING_POLARITY: NORMAL
MDC_IDC_SET_LEADCHNL_RA_SENSING_SENSITIVITY: 0.25 MV
MDC_IDC_SET_LEADCHNL_RV_PACING_AMPLITUDE: 2 V
MDC_IDC_SET_LEADCHNL_RV_PACING_POLARITY: NORMAL
MDC_IDC_SET_LEADCHNL_RV_PACING_PULSEWIDTH: 0.5 MS
MDC_IDC_SET_LEADCHNL_RV_SENSING_ADAPTATION_MODE: NORMAL
MDC_IDC_SET_LEADCHNL_RV_SENSING_POLARITY: NORMAL
MDC_IDC_SET_LEADCHNL_RV_SENSING_SENSITIVITY: 0.6 MV
MDC_IDC_SET_ZONE_DETECTION_INTERVAL: 300 MS
MDC_IDC_SET_ZONE_DETECTION_INTERVAL: 353 MS
MDC_IDC_SET_ZONE_TYPE: NORMAL
MDC_IDC_SET_ZONE_TYPE: NORMAL
MDC_IDC_SET_ZONE_VENDOR_TYPE: NORMAL
MDC_IDC_SET_ZONE_VENDOR_TYPE: NORMAL
MDC_IDC_STAT_AT_BURDEN_PERCENT: 0 %
MDC_IDC_STAT_AT_DTM_END: NORMAL
MDC_IDC_STAT_AT_DTM_START: NORMAL
MDC_IDC_STAT_BRADY_DTM_END: NORMAL
MDC_IDC_STAT_BRADY_DTM_START: NORMAL
MDC_IDC_STAT_BRADY_RA_PERCENT_PACED: 14 %
MDC_IDC_STAT_BRADY_RV_PERCENT_PACED: 97 %
MDC_IDC_STAT_CRT_DTM_END: NORMAL
MDC_IDC_STAT_CRT_DTM_START: NORMAL
MDC_IDC_STAT_CRT_LV_PERCENT_PACED: 97 %
MDC_IDC_STAT_EPISODE_RECENT_COUNT: 0
MDC_IDC_STAT_EPISODE_RECENT_COUNT_DTM_END: NORMAL
MDC_IDC_STAT_EPISODE_RECENT_COUNT_DTM_START: NORMAL
MDC_IDC_STAT_EPISODE_TYPE: NORMAL
MDC_IDC_STAT_EPISODE_VENDOR_TYPE: NORMAL
MDC_IDC_STAT_TACHYTHERAPY_ATP_DELIVERED_RECENT: 0
MDC_IDC_STAT_TACHYTHERAPY_ATP_DELIVERED_TOTAL: 1
MDC_IDC_STAT_TACHYTHERAPY_RECENT_DTM_END: NORMAL
MDC_IDC_STAT_TACHYTHERAPY_RECENT_DTM_START: NORMAL
MDC_IDC_STAT_TACHYTHERAPY_SHOCKS_ABORTED_RECENT: 0
MDC_IDC_STAT_TACHYTHERAPY_SHOCKS_ABORTED_TOTAL: 1
MDC_IDC_STAT_TACHYTHERAPY_SHOCKS_DELIVERED_RECENT: 0
MDC_IDC_STAT_TACHYTHERAPY_SHOCKS_DELIVERED_TOTAL: 2
MDC_IDC_STAT_TACHYTHERAPY_TOTAL_DTM_END: NORMAL
MDC_IDC_STAT_TACHYTHERAPY_TOTAL_DTM_START: NORMAL

## 2023-03-03 PROCEDURE — 93296 REM INTERROG EVL PM/IDS: CPT | Performed by: INTERNAL MEDICINE

## 2023-03-03 PROCEDURE — 93295 DEV INTERROG REMOTE 1/2/MLT: CPT | Performed by: INTERNAL MEDICINE

## 2023-04-18 DIAGNOSIS — G47.00 INSOMNIA, UNSPECIFIED TYPE: ICD-10-CM

## 2023-04-18 DIAGNOSIS — E78.5 HYPERLIPIDEMIA LDL GOAL <70: ICD-10-CM

## 2023-04-18 DIAGNOSIS — I42.9 CARDIOMYOPATHY (H): ICD-10-CM

## 2023-04-20 RX ORDER — TRAZODONE HYDROCHLORIDE 50 MG/1
TABLET, FILM COATED ORAL
Qty: 90 TABLET | Refills: 3 | OUTPATIENT
Start: 2023-04-20

## 2023-04-20 RX ORDER — SIMVASTATIN 40 MG
TABLET ORAL
Qty: 90 TABLET | Refills: 3 | OUTPATIENT
Start: 2023-04-20

## 2023-04-20 RX ORDER — METOPROLOL SUCCINATE 100 MG/1
TABLET, EXTENDED RELEASE ORAL
Qty: 90 TABLET | Refills: 3 | OUTPATIENT
Start: 2023-04-20

## 2023-04-20 RX ORDER — METOPROLOL SUCCINATE 50 MG/1
TABLET, EXTENDED RELEASE ORAL
Qty: 90 TABLET | Refills: 3 | OUTPATIENT
Start: 2023-04-20

## 2023-05-17 ENCOUNTER — OFFICE VISIT (OUTPATIENT)
Dept: CARDIOLOGY | Facility: CLINIC | Age: 78
End: 2023-05-17
Attending: INTERNAL MEDICINE
Payer: COMMERCIAL

## 2023-05-17 VITALS
BODY MASS INDEX: 33.91 KG/M2 | RESPIRATION RATE: 16 BRPM | HEART RATE: 60 BPM | WEIGHT: 238 LBS | SYSTOLIC BLOOD PRESSURE: 124 MMHG | DIASTOLIC BLOOD PRESSURE: 62 MMHG

## 2023-05-17 DIAGNOSIS — Z95.810 ICD (IMPLANTABLE CARDIOVERTER-DEFIBRILLATOR), BIVENTRICULAR, IN SITU: ICD-10-CM

## 2023-05-17 DIAGNOSIS — I50.9 CONGESTIVE HEART FAILURE (CHF) (H): ICD-10-CM

## 2023-05-17 DIAGNOSIS — Z95.810 ICD (IMPLANTABLE CARDIOVERTER-DEFIBRILLATOR), BIVENTRICULAR, IN SITU: Primary | ICD-10-CM

## 2023-05-17 DIAGNOSIS — I44.2 THIRD DEGREE AV BLOCK (H): ICD-10-CM

## 2023-05-17 DIAGNOSIS — E78.5 HYPERLIPIDEMIA LDL GOAL <70: ICD-10-CM

## 2023-05-17 DIAGNOSIS — I50.30 HEART FAILURE WITH PRESERVED EJECTION FRACTION, NYHA CLASS II (H): Primary | ICD-10-CM

## 2023-05-17 LAB
MDC_IDC_LEAD_IMPLANT_DT: NORMAL
MDC_IDC_LEAD_LOCATION: NORMAL
MDC_IDC_LEAD_LOCATION_DETAIL_1: NORMAL
MDC_IDC_LEAD_MFG: NORMAL
MDC_IDC_LEAD_MODEL: NORMAL
MDC_IDC_LEAD_POLARITY_TYPE: NORMAL
MDC_IDC_LEAD_SERIAL: NORMAL
MDC_IDC_LEAD_SPECIAL_FUNCTION: NORMAL
MDC_IDC_MSMT_BATTERY_DTM: NORMAL
MDC_IDC_MSMT_BATTERY_REMAINING_LONGEVITY: 36 MO
MDC_IDC_MSMT_BATTERY_REMAINING_PERCENTAGE: 51 %
MDC_IDC_MSMT_BATTERY_STATUS: NORMAL
MDC_IDC_MSMT_CAP_CHARGE_DTM: NORMAL
MDC_IDC_MSMT_CAP_CHARGE_DTM: NORMAL
MDC_IDC_MSMT_CAP_CHARGE_ENERGY: 41 J
MDC_IDC_MSMT_CAP_CHARGE_TIME: 12.5 S
MDC_IDC_MSMT_CAP_CHARGE_TIME: 8.8 S
MDC_IDC_MSMT_CAP_CHARGE_TYPE: NORMAL
MDC_IDC_MSMT_CAP_CHARGE_TYPE: NORMAL
MDC_IDC_MSMT_LEADCHNL_LV_IMPEDANCE_VALUE: 808 OHM
MDC_IDC_MSMT_LEADCHNL_LV_IMPEDANCE_VALUE: 808 OHM
MDC_IDC_MSMT_LEADCHNL_LV_LEAD_CHANNEL_STATUS: NORMAL
MDC_IDC_MSMT_LEADCHNL_LV_PACING_THRESHOLD_AMPLITUDE: 0.8 V
MDC_IDC_MSMT_LEADCHNL_LV_PACING_THRESHOLD_AMPLITUDE: 0.8 V
MDC_IDC_MSMT_LEADCHNL_LV_PACING_THRESHOLD_PULSEWIDTH: 0.5 MS
MDC_IDC_MSMT_LEADCHNL_LV_PACING_THRESHOLD_PULSEWIDTH: 0.5 MS
MDC_IDC_MSMT_LEADCHNL_RA_IMPEDANCE_VALUE: 373 OHM
MDC_IDC_MSMT_LEADCHNL_RA_IMPEDANCE_VALUE: 373 OHM
MDC_IDC_MSMT_LEADCHNL_RA_PACING_THRESHOLD_AMPLITUDE: 0.7 V
MDC_IDC_MSMT_LEADCHNL_RA_PACING_THRESHOLD_AMPLITUDE: 0.7 V
MDC_IDC_MSMT_LEADCHNL_RA_PACING_THRESHOLD_PULSEWIDTH: 0.5 MS
MDC_IDC_MSMT_LEADCHNL_RA_PACING_THRESHOLD_PULSEWIDTH: 0.5 MS
MDC_IDC_MSMT_LEADCHNL_RA_SENSING_INTR_AMPL: 3.2 MV
MDC_IDC_MSMT_LEADCHNL_RV_IMPEDANCE_VALUE: 376 OHM
MDC_IDC_MSMT_LEADCHNL_RV_IMPEDANCE_VALUE: 376 OHM
MDC_IDC_MSMT_LEADCHNL_RV_PACING_THRESHOLD_AMPLITUDE: 1 V
MDC_IDC_MSMT_LEADCHNL_RV_PACING_THRESHOLD_AMPLITUDE: 1 V
MDC_IDC_MSMT_LEADCHNL_RV_PACING_THRESHOLD_PULSEWIDTH: 0.5 MS
MDC_IDC_MSMT_LEADCHNL_RV_PACING_THRESHOLD_PULSEWIDTH: 0.5 MS
MDC_IDC_PG_IMPLANT_DTM: NORMAL
MDC_IDC_PG_MFG: NORMAL
MDC_IDC_PG_MODEL: NORMAL
MDC_IDC_PG_SERIAL: NORMAL
MDC_IDC_PG_TYPE: NORMAL
MDC_IDC_SESS_CLINIC_NAME: NORMAL
MDC_IDC_SESS_DTM: NORMAL
MDC_IDC_SESS_TYPE: NORMAL
MDC_IDC_SET_BRADY_AT_MODE_SWITCH_MODE: NORMAL
MDC_IDC_SET_BRADY_AT_MODE_SWITCH_RATE: 170 {BEATS}/MIN
MDC_IDC_SET_BRADY_LOWRATE: 60 {BEATS}/MIN
MDC_IDC_SET_BRADY_MAX_SENSOR_RATE: 130 {BEATS}/MIN
MDC_IDC_SET_BRADY_MAX_TRACKING_RATE: 140 {BEATS}/MIN
MDC_IDC_SET_BRADY_MODE: NORMAL
MDC_IDC_SET_BRADY_PAV_DELAY_LOW: 180 MS
MDC_IDC_SET_BRADY_SAV_DELAY_LOW: 150 MS
MDC_IDC_SET_CRT_LVRV_DELAY: 0 MS
MDC_IDC_SET_CRT_PACED_CHAMBERS: NORMAL
MDC_IDC_SET_LEADCHNL_LV_PACING_AMPLITUDE: 2 V
MDC_IDC_SET_LEADCHNL_LV_PACING_ANODE_ELECTRODE_1: NORMAL
MDC_IDC_SET_LEADCHNL_LV_PACING_ANODE_LOCATION_1: NORMAL
MDC_IDC_SET_LEADCHNL_LV_PACING_CATHODE_ELECTRODE_1: NORMAL
MDC_IDC_SET_LEADCHNL_LV_PACING_CATHODE_LOCATION_1: NORMAL
MDC_IDC_SET_LEADCHNL_LV_PACING_PULSEWIDTH: 0.5 MS
MDC_IDC_SET_LEADCHNL_LV_SENSING_ADAPTATION_MODE: NORMAL
MDC_IDC_SET_LEADCHNL_LV_SENSING_ANODE_ELECTRODE_1: NORMAL
MDC_IDC_SET_LEADCHNL_LV_SENSING_ANODE_LOCATION_1: NORMAL
MDC_IDC_SET_LEADCHNL_LV_SENSING_CATHODE_ELECTRODE_1: NORMAL
MDC_IDC_SET_LEADCHNL_LV_SENSING_CATHODE_LOCATION_1: NORMAL
MDC_IDC_SET_LEADCHNL_LV_SENSING_SENSITIVITY: 1 MV
MDC_IDC_SET_LEADCHNL_RA_PACING_AMPLITUDE: 1.2 V
MDC_IDC_SET_LEADCHNL_RA_PACING_POLARITY: NORMAL
MDC_IDC_SET_LEADCHNL_RA_PACING_PULSEWIDTH: 0.5 MS
MDC_IDC_SET_LEADCHNL_RA_SENSING_ADAPTATION_MODE: NORMAL
MDC_IDC_SET_LEADCHNL_RA_SENSING_POLARITY: NORMAL
MDC_IDC_SET_LEADCHNL_RA_SENSING_SENSITIVITY: 0.25 MV
MDC_IDC_SET_LEADCHNL_RV_PACING_AMPLITUDE: 2 V
MDC_IDC_SET_LEADCHNL_RV_PACING_POLARITY: NORMAL
MDC_IDC_SET_LEADCHNL_RV_PACING_PULSEWIDTH: 0.5 MS
MDC_IDC_SET_LEADCHNL_RV_SENSING_ADAPTATION_MODE: NORMAL
MDC_IDC_SET_LEADCHNL_RV_SENSING_POLARITY: NORMAL
MDC_IDC_SET_LEADCHNL_RV_SENSING_SENSITIVITY: 0.6 MV
MDC_IDC_SET_ZONE_DETECTION_INTERVAL: 300 MS
MDC_IDC_SET_ZONE_DETECTION_INTERVAL: 353 MS
MDC_IDC_SET_ZONE_TYPE: NORMAL
MDC_IDC_SET_ZONE_TYPE: NORMAL
MDC_IDC_SET_ZONE_VENDOR_TYPE: NORMAL
MDC_IDC_SET_ZONE_VENDOR_TYPE: NORMAL
MDC_IDC_STAT_AT_BURDEN_PERCENT: 1 %
MDC_IDC_STAT_AT_DTM_END: NORMAL
MDC_IDC_STAT_AT_DTM_START: NORMAL
MDC_IDC_STAT_BRADY_DTM_END: NORMAL
MDC_IDC_STAT_BRADY_DTM_START: NORMAL
MDC_IDC_STAT_BRADY_RA_PERCENT_PACED: 14 %
MDC_IDC_STAT_BRADY_RV_PERCENT_PACED: 96 %
MDC_IDC_STAT_CRT_DTM_END: NORMAL
MDC_IDC_STAT_CRT_DTM_START: NORMAL
MDC_IDC_STAT_CRT_LV_PERCENT_PACED: 96 %
MDC_IDC_STAT_EPISODE_RECENT_COUNT: 0
MDC_IDC_STAT_EPISODE_RECENT_COUNT: 1
MDC_IDC_STAT_EPISODE_RECENT_COUNT_DTM_END: NORMAL
MDC_IDC_STAT_EPISODE_RECENT_COUNT_DTM_START: NORMAL
MDC_IDC_STAT_EPISODE_TYPE: NORMAL
MDC_IDC_STAT_EPISODE_VENDOR_TYPE: NORMAL
MDC_IDC_STAT_TACHYTHERAPY_ATP_DELIVERED_RECENT: 0
MDC_IDC_STAT_TACHYTHERAPY_ATP_DELIVERED_TOTAL: 1
MDC_IDC_STAT_TACHYTHERAPY_RECENT_DTM_END: NORMAL
MDC_IDC_STAT_TACHYTHERAPY_RECENT_DTM_START: NORMAL
MDC_IDC_STAT_TACHYTHERAPY_SHOCKS_ABORTED_RECENT: 0
MDC_IDC_STAT_TACHYTHERAPY_SHOCKS_ABORTED_TOTAL: 1
MDC_IDC_STAT_TACHYTHERAPY_SHOCKS_DELIVERED_RECENT: 0
MDC_IDC_STAT_TACHYTHERAPY_SHOCKS_DELIVERED_TOTAL: 2
MDC_IDC_STAT_TACHYTHERAPY_TOTAL_DTM_END: NORMAL
MDC_IDC_STAT_TACHYTHERAPY_TOTAL_DTM_START: NORMAL

## 2023-05-17 PROCEDURE — 93284 PRGRMG EVAL IMPLANTABLE DFB: CPT | Performed by: INTERNAL MEDICINE

## 2023-05-17 PROCEDURE — 99204 OFFICE O/P NEW MOD 45 MIN: CPT | Performed by: INTERNAL MEDICINE

## 2023-05-17 RX ORDER — METOPROLOL SUCCINATE 50 MG/1
50 TABLET, EXTENDED RELEASE ORAL DAILY
Qty: 90 TABLET | Refills: 3 | Status: SHIPPED | OUTPATIENT
Start: 2023-05-17 | End: 2023-06-19

## 2023-05-17 RX ORDER — METOPROLOL SUCCINATE 100 MG/1
100 TABLET, EXTENDED RELEASE ORAL AT BEDTIME
Qty: 90 TABLET | Refills: 3 | Status: SHIPPED | OUTPATIENT
Start: 2023-05-17 | End: 2023-06-19

## 2023-05-17 RX ORDER — SIMVASTATIN 40 MG
40 TABLET ORAL AT BEDTIME
Qty: 90 TABLET | Refills: 3 | Status: SHIPPED | OUTPATIENT
Start: 2023-05-17 | End: 2023-06-19

## 2023-05-17 NOTE — PATIENT INSTRUCTIONS
We will schedule an echocardiogram to make sure your heart muscle function remains normal.  Try to get 150 minutes of moderate aerobic activities in each week.  Walking is great.  I fully endorse your plans to join the [facility] and find a piece of exercise equipment you enjoy.  It may be helpful for you to make sure your CPAP device is working well.  No medication changes are suggested today.

## 2023-05-17 NOTE — PROGRESS NOTES
Cardiology Clinic Office Note    Assessment / Plan:    1.  Heart failure with preserved ejection fraction likely secondary to obstructive sleep apnea.  Right ventricular systolic function preserved on last check 2018.  Blood pressure well controlled on present medical regimen.  2.  Status post bradycardia arrest with CRT-D in place and normal function demonstrated.  No ventricular tachycardia detected this year.  3.  Obesity, sedentary lifestyle.  Advocated 150 minutes of moderate aerobic activities each week.  They plan to join the [facility].    Follow-up 1 year    ______________________________________________________________________    Subjective:    I had the opportunity to see Víctor Calderon at the [facility]. Víctor Calderon is a 77 year old male with a history of bradycardia.  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  He underwent CRT-D therapy.  Since then there has been gradual improvement in his ejection fraction.  An echocardiogram done 2018 showed an improvement in his ejection fraction up to 50%.  He has no history of significant coronary artery disease.   I last saw him in 2019.  At that time he had experienced 1 x 4-hour episode of atrial fibrillation associated with biliary colic and was not placed on anticoagulation.  He presents today, accompanied by his wife    He reports feeling well, though not doing much in the way of exercise.  He feels that his sleep is restorative on CPAP though he still naps about 3 days a week.  He has not had any follow-up of his CPAP device to make sure it still working for him.  He does feel that his stamina is stable with limited activities.  They stopped exercising because of COVID and then their dog .  Device check today shows no atrial fibrillation, no ventricular arrhythmias, 96% biventricular paced.    He has had no chest pain paroxysmal nocturnal dyspnea orthopnea or  palpitations.  He offers no complaints today.  They are on a keto diet and of lost 10 pounds over the last 4years, but do not limit their sodium intake.  ______________________________________________________________________    Problem List:  Patient Active Problem List   Diagnosis     Skin Neoplasm Of Uncertain Behavior     Hypercholesterolemia     Obstructive Sleep Apnea     Esophageal Reflux     Impaired Fasting Glucose     Changed Sexual Interest (Libido): Decreased     Cholelithiasis     Laryngeal Neoplasm Of The Vocal Cord     HTN (hypertension)     Dermatitis     Hoarseness     Male Erectile Disorder     Hypogonadism     Insomnia     Benign Tubular Adenoma Of The Large Intestine     Class 1 obesity due to excess calories with serious comorbidity and body mass index (BMI) of 31.0 to 31.9 in adult     Skin Tag (___ Mm)     Tachycardia     Bradycardia     Heart block AV complete (H)     Right ventricular dysfunction     CAD (coronary artery disease)     Normochromic normocytic anemia     NSVT (nonsustained ventricular tachycardia) (H)     Ischemic cardiomyopathy     Complete AV block, acquired (H)     Adenomatous colon polyp     Tubular adenoma of colon     ROMELIA on CPAP     ICD (implantable cardioverter-defibrillator), biventricular, in situ     Bilateral hearing loss     Erectile dysfunction, unspecified erectile dysfunction type     Cholelithiasis     Paroxysmal atrial fibrillation (H)     Epigastric pain     Symptomatic cholelithiasis     Choledocholithiasis     Hypoxia     Medical History:  Past Medical History:   Diagnosis Date     Cholelithiasis      Coronary artery disease      Dermatitis      GERD (gastroesophageal reflux disease)      H/O cardiac arrest 2/2015    alka/torsades     Hyperlipidemia      Hypertension      Hypogonadism male      Male erectile disorder      Obesity      Sleep apnea     CPAP     Ventricular tachycardia (H)     seen on pacer interrogation     Surgical History:  Past Surgical  History:   Procedure Laterality Date     CARDIAC CATHETERIZATION       COLONOSCOPY N/A 2015    Procedure: COLONOSCOPY;  Surgeon: Rafiq Gu MD;  Location: [facility];  Service:      HC REMOVE TONSILS/ADENOIDS,<13 Y/O      Description: Tonsillectomy With Adenoidectomy;  Recorded: 2008;     HERNIA REPAIR      inguinal      INSERT / REPLACE / REMOVE PACEMAKER       LAPAROSCOPIC CHOLECYSTECTOMY N/A 10/2/2019    Procedure: CHOLECYSTECTOMY, LAPAROSCOPIC;  Surgeon: Shin Baird MD;  Location: [facility];  Service: General     OTHER SURGICAL HISTORY  2010    polypremoved during colonoscopy     VT ERCP DX COLLECTION SPECIMEN BRUSHING/WASHING N/A 5/10/2020    Procedure: ENDOSCOPIC RETROGRADE CHOLANGIOPANCREATOGRAPHY;  Surgeon: Simon Loaiza MD;  Location: [facility];  Service: Gastroenterology     REMOVAL OF SPERM DUCT(S)      Description: Surgery Of Male Genitalia Vasectomy;  Recorded: 2008;     TONSILLECTOMY       TUMOR REMOVAL      right vocal cord -      XR ERCP BILIARY ONLY  5/10/2020     Social History:  Social History     Socioeconomic History     Marital status:      Spouse name: Not on file     Number of children: Not on file     Years of education: Not on file     Highest education level: Not on file   Occupational History     Not on file   Tobacco Use     Smoking status: Former     Packs/day: 0.00     Types: Cigarettes     Quit date: 2008     Years since quittin.9     Smokeless tobacco: Never   Vaping Use     Vaping status: Not on file   Substance and Sexual Activity     Alcohol use: No     Drug use: No     Sexual activity: Not on file   Other Topics Concern     Not on file   Social History Narrative     about 8 years ago after 44 years marriage  Now engaged to his fiance whom he found on [email or web address].   Has his own machine shop.     Nury Highha 2020     Social Determinants of Health     Financial Resource Strain: Not on file   Food  Insecurity: Not on file   Transportation Needs: Not on file   Physical Activity: Not on file   Stress: Not on file   Social Connections: Not on file   Intimate Partner Violence: Not on file   Housing Stability: Not on file     Sleep History:  Generally restorative on sleep  Exercise History:  Yard work.    Review of Systems:      12 point review of systems otherwise negative     Please refer above for cardiac ROS details.         Family History:  Family History   Problem Relation Age of Onset     Pneumonia Mother      Hypertension Mother      Cancer Father      Chronic Obstructive Pulmonary Disease Sister      Multiple Sclerosis Sister      Acute Myocardial Infarction No family hx of          Allergies:  Allergies   Allergen Reactions     Zithromax [Azithromycin] Rash     Medications:  Current Outpatient Medications   Medication Sig Dispense Refill     aspirin 81 MG EC tablet [ASPIRIN 81 MG EC TABLET] Take 81 mg by mouth daily.       calcium carbonate (CALCIUM CARBONATE) 300 mg (750 mg) Chew [CALCIUM CARBONATE (CALCIUM CARBONATE) 300 MG (750 MG) CHEW] Chew 2-3 tablets as needed (heartburn).              coenzyme Q10 (CO Q-10) 200 mg capsule [COENZYME Q10 (CO Q-10) 200 MG CAPSULE] Take 200 mg by mouth daily.        DIPHENHYDRAMINE HCL (BENADRYL ALLERGY ORAL) [DIPHENHYDRAMINE HCL (BENADRYL ALLERGY ORAL)] Take 25-50 mg by mouth every 6 (six) hours as needed (allergies).              fish oil-omega-3 fatty acids (FISH OIL) 300-1,000 mg capsule [FISH OIL-OMEGA-3 FATTY ACIDS (FISH OIL) 300-1,000 MG CAPSULE] Take 1 g by mouth daily.       latanoprost (XALATAN) 0.005 % ophthalmic solution [LATANOPROST (XALATAN) 0.005 % OPHTHALMIC SOLUTION] Administer 1 drop to both eyes at bedtime.              lisinopril (ZESTRIL) 5 MG tablet Take 1 tablet (5 mg) by mouth daily 90 tablet 2     metoprolol succinate ER (TOPROL-XL) 100 MG 24 hr tablet TAKE 1 TABLET AT BEDTIME 90 tablet 3     metoprolol succinate ER (TOPROL-XL) 50 MG 24 hr  tablet TAKE 1 TABLET PLUS 1 TABLET  MG DAILY FOR TOTAL DOSE  MG 90 tablet 3     multivitamin therapeutic tablet [MULTIVITAMIN THERAPEUTIC TABLET] Take 1 tablet by mouth daily.       nitroglycerin (NITROSTAT) 0.4 MG SL tablet [NITROGLYCERIN (NITROSTAT) 0.4 MG SL TABLET] Place 1 tablet (0.4 mg total) under the tongue every 5 (five) minutes as needed for chest pain. 100 tablet 3     omeprazole (PRILOSEC) 20 MG DR capsule TAKE 1 CAPSULE AS NEEDED 90 capsule 3     oxymetazoline (AFRIN) 0.05 % nasal spray [OXYMETAZOLINE (AFRIN) 0.05 % NASAL SPRAY] Apply 2 sprays into each nostril daily as needed for congestion.       psyllium (METAMUCIL) 3.4 gram packet [PSYLLIUM (METAMUCIL) 3.4 GRAM PACKET] Take 1 packet by mouth 2 (two) times a day.       sildenafiL (VIAGRA) 50 MG tablet [SILDENAFIL (VIAGRA) 50 MG TABLET] Take 1 tablet (50 mg total) by mouth as needed. 30 tablet 11     simvastatin (ZOCOR) 40 MG tablet TAKE 1 TABLET AT BEDTIME 90 tablet 3     tadalafil (CIALIS) 10 MG tablet Take 1 tablet (10 mg) by mouth daily as needed 30 tablet 11     traMADol (ULTRAM) 50 mg tablet [TRAMADOL (ULTRAM) 50 MG TABLET] Take 1-2 tablets ( mg total) by mouth every 6 (six) hours as needed for pain. Take 1-2 tablets every 6 hours as needed for pain 15 tablet 1     traZODone (DESYREL) 50 MG tablet [TRAZODONE (DESYREL) 50 MG TABLET] TAKE 1 TABLET AT BEDTIME (Patient taking differently: Take 50 mg by mouth as needed for sleep [TRAZODONE (DESYREL) 50 MG TABLET] TAKE 1 TABLET AT BEDTIME) 90 tablet 3       Objective:   Wt Readings from Last 3 Encounters:   05/17/23 108 kg (238 lb)   06/15/22 105.7 kg (233 lb)   12/15/21 108.4 kg (239 lb)     Vital signs:  /62 (BP Location: Right arm, Patient Position: Sitting, Cuff Size: Adult Large)   Pulse 60   Resp 16   Wt 108 kg (238 lb)   BMI 33.91 kg/m        Physical Exam:    General Appearance : Awake, Alert, No acute distress  HEENT: No Scleral icterus; the mucous membranes were  pink and moist.  Conjunctivae bilaterally injected  Neck:  No cervical bruits, jugular venous distention, or thyromegaly   Chest: The spine was straight. Chest wall symmetric  Lungs: Respirations unlabored; the lungs are clear to auscultation.  No wheezing   Cardiovascular: Nonpalpable point of maximal impulse.  Auscultation reveals regular first and second heart sounds with no murmurs, rubs, or gallops.  Carotid, radial, and dorsalis pedal pulses are intact and symmetric.    Abdomen: Obese.  No organomegaly, masses, bruits, or tenderness. Bowels sounds are present  Extremities:  No clubbing, cyanosis.  1+ pretibial and ankle edema  Skin: No rash, bruising  Musculoskeletal: No tenderness.  Neurologic: Alert and oriented ×3. Speech is fluent.    Lab Results:  LIPIDS:  Lab Results   Component Value Date    CHOL 157 12/15/2021    CHOL 151 06/15/2021    CHOL 160 12/15/2020     Lab Results   Component Value Date    HDL 51 12/15/2021    HDL 44 06/15/2021    HDL 54 12/15/2020     Lab Results   Component Value Date    LDL 80 12/15/2021    LDL 82 06/15/2021    LDL 79 12/15/2020     Lab Results   Component Value Date    TRIG 128 12/15/2021    TRIG 125 06/15/2021    TRIG 137 12/15/2020       Echocardiogram 2018:    Left ventricle ejection fraction is mildly decreased. The calculated left ventricular ejection fraction is 51%.    Normal left ventricular size.    Normal right ventricular size and systolic function.    Aortic Valve: The aortic valve is sclerotic without reduced excursion. Mild aortic regurgitation.    No hemodynamically significant valvular heart abnormalities.    When compared to the previous study dated 1/4/2016, no significant change.            SAYRA PRATHER MD [facility]  [phone]    This note created using Dragon voice recognition software.  Sound alike errors may have escaped editing.

## 2023-05-17 NOTE — LETTER
5/17/2023    Adams Garcia MD  [address]  [facility] [address]    RE: Víctor Calderon       Dear Colleague,     I had the pleasure of seeing Víctor Calderon in the [facility].    Cardiology Clinic Office Note    Assessment / Plan:    1.  Heart failure with preserved ejection fraction likely secondary to obstructive sleep apnea.  Right ventricular systolic function preserved on last check 2018.  Blood pressure well controlled on present medical regimen.  2.  Status post bradycardia arrest with CRT-D in place and normal function demonstrated.  No ventricular tachycardia detected this year.  3.  Obesity, sedentary lifestyle.  Advocated 150 minutes of moderate aerobic activities each week.  They plan to join the [facility].    Follow-up 1 year    ______________________________________________________________________    Subjective:    I had the opportunity to see Víctor Calderon at the [facility]. Víctor Calderon is a 77 year old male with a history of bradycardia.  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  He underwent CRT-D therapy.  Since then there has been gradual improvement in his ejection fraction.  An echocardiogram done 2018 showed an improvement in his ejection fraction up to 50%.  He has no history of significant coronary artery disease.   I last saw him in 2019.  At that time he had experienced 1 x 4-hour episode of atrial fibrillation associated with biliary colic and was not placed on anticoagulation.  He presents today, accompanied by his wife    He reports feeling well, though not doing much in the way of exercise.  He feels that his sleep is restorative on CPAP though he still naps about 3 days a week.  He has not had any follow-up of his CPAP device to make sure it still working for him.  He does feel that his stamina is stable with limited activities.  They stopped exercising  because of COVID and then their dog .  Device check today shows no atrial fibrillation, no ventricular arrhythmias, 96% biventricular paced.    He has had no chest pain paroxysmal nocturnal dyspnea orthopnea or palpitations.  He offers no complaints today.  They are on a keto diet and of lost 10 pounds over the last 4years, but do not limit their sodium intake.  ______________________________________________________________________    Problem List:  Patient Active Problem List   Diagnosis    Skin Neoplasm Of Uncertain Behavior    Hypercholesterolemia    Obstructive Sleep Apnea    Esophageal Reflux    Impaired Fasting Glucose    Changed Sexual Interest (Libido): Decreased    Cholelithiasis    Laryngeal Neoplasm Of The Vocal Cord    HTN (hypertension)    Dermatitis    Hoarseness    Male Erectile Disorder    Hypogonadism    Insomnia    Benign Tubular Adenoma Of The Large Intestine    Class 1 obesity due to excess calories with serious comorbidity and body mass index (BMI) of 31.0 to 31.9 in adult    Skin Tag (___ Mm)    Tachycardia    Bradycardia    Heart block AV complete (H)    Right ventricular dysfunction    CAD (coronary artery disease)    Normochromic normocytic anemia    NSVT (nonsustained ventricular tachycardia) (H)    Ischemic cardiomyopathy    Complete AV block, acquired (H)    Adenomatous colon polyp    Tubular adenoma of colon    ROMELIA on CPAP    ICD (implantable cardioverter-defibrillator), biventricular, in situ    Bilateral hearing loss    Erectile dysfunction, unspecified erectile dysfunction type    Cholelithiasis    Paroxysmal atrial fibrillation (H)    Epigastric pain    Symptomatic cholelithiasis    Choledocholithiasis    Hypoxia     Medical History:  Past Medical History:   Diagnosis Date    Cholelithiasis     Coronary artery disease     Dermatitis     GERD (gastroesophageal reflux disease)     H/O cardiac arrest 2015    alka/torsades    Hyperlipidemia     Hypertension     Hypogonadism male      Male erectile disorder     Obesity     Sleep apnea     CPAP    Ventricular tachycardia (H)     seen on pacer interrogation     Surgical History:  Past Surgical History:   Procedure Laterality Date    CARDIAC CATHETERIZATION      COLONOSCOPY N/A 2015    Procedure: COLONOSCOPY;  Surgeon: Rafiq Gu MD;  Location: [facility];  Service:     HC REMOVE TONSILS/ADENOIDS,<13 Y/O      Description: Tonsillectomy With Adenoidectomy;  Recorded: 2008;    HERNIA REPAIR      inguinal     INSERT / REPLACE / REMOVE PACEMAKER      LAPAROSCOPIC CHOLECYSTECTOMY N/A 10/2/2019    Procedure: CHOLECYSTECTOMY, LAPAROSCOPIC;  Surgeon: Shin Baird MD;  Location: [facility] OR;  Service: General    OTHER SURGICAL HISTORY  2010    polypremoved during colonoscopy    SD ERCP DX COLLECTION SPECIMEN BRUSHING/WASHING N/A 5/10/2020    Procedure: ENDOSCOPIC RETROGRADE CHOLANGIOPANCREATOGRAPHY;  Surgeon: Simon Loaiza MD;  Location: [facility];  Service: Gastroenterology    REMOVAL OF SPERM DUCT(S)      Description: Surgery Of Male Genitalia Vasectomy;  Recorded: 2008;    TONSILLECTOMY      TUMOR REMOVAL      right vocal cord -     XR ERCP BILIARY ONLY  5/10/2020     Social History:  Social History     Socioeconomic History    Marital status:      Spouse name: Not on file    Number of children: Not on file    Years of education: Not on file    Highest education level: Not on file   Occupational History    Not on file   Tobacco Use    Smoking status: Former     Packs/day: 0.00     Types: Cigarettes     Quit date: 2008     Years since quittin.9    Smokeless tobacco: Never   Vaping Use    Vaping status: Not on file   Substance and Sexual Activity    Alcohol use: No    Drug use: No    Sexual activity: Not on file   Other Topics Concern    Not on file   Social History Narrative     about 8 years ago after 44 years marriage  Now engaged to his fiance whom he found on [email or web address].    Has his own machine shop.     Nury Shah 2/7/2020     Social Determinants of Health     Financial Resource Strain: Not on file   Food Insecurity: Not on file   Transportation Needs: Not on file   Physical Activity: Not on file   Stress: Not on file   Social Connections: Not on file   Intimate Partner Violence: Not on file   Housing Stability: Not on file     Sleep History:  Generally restorative on sleep  Exercise History:  Yard work.    Review of Systems:      12 point review of systems otherwise negative     Please refer above for cardiac ROS details.         Family History:  Family History   Problem Relation Age of Onset    Pneumonia Mother     Hypertension Mother     Cancer Father     Chronic Obstructive Pulmonary Disease Sister     Multiple Sclerosis Sister     Acute Myocardial Infarction No family hx of          Allergies:  Allergies   Allergen Reactions    Zithromax [Azithromycin] Rash     Medications:  Current Outpatient Medications   Medication Sig Dispense Refill    aspirin 81 MG EC tablet [ASPIRIN 81 MG EC TABLET] Take 81 mg by mouth daily.      calcium carbonate (CALCIUM CARBONATE) 300 mg (750 mg) Chew [CALCIUM CARBONATE (CALCIUM CARBONATE) 300 MG (750 MG) CHEW] Chew 2-3 tablets as needed (heartburn).             coenzyme Q10 (CO Q-10) 200 mg capsule [COENZYME Q10 (CO Q-10) 200 MG CAPSULE] Take 200 mg by mouth daily.       DIPHENHYDRAMINE HCL (BENADRYL ALLERGY ORAL) [DIPHENHYDRAMINE HCL (BENADRYL ALLERGY ORAL)] Take 25-50 mg by mouth every 6 (six) hours as needed (allergies).             fish oil-omega-3 fatty acids (FISH OIL) 300-1,000 mg capsule [FISH OIL-OMEGA-3 FATTY ACIDS (FISH OIL) 300-1,000 MG CAPSULE] Take 1 g by mouth daily.      latanoprost (XALATAN) 0.005 % ophthalmic solution [LATANOPROST (XALATAN) 0.005 % OPHTHALMIC SOLUTION] Administer 1 drop to both eyes at bedtime.             lisinopril (ZESTRIL) 5 MG tablet Take 1 tablet (5 mg) by mouth daily 90 tablet 2    metoprolol succinate ER  (TOPROL-XL) 100 MG 24 hr tablet TAKE 1 TABLET AT BEDTIME 90 tablet 3    metoprolol succinate ER (TOPROL-XL) 50 MG 24 hr tablet TAKE 1 TABLET PLUS 1 TABLET  MG DAILY FOR TOTAL DOSE  MG 90 tablet 3    multivitamin therapeutic tablet [MULTIVITAMIN THERAPEUTIC TABLET] Take 1 tablet by mouth daily.      nitroglycerin (NITROSTAT) 0.4 MG SL tablet [NITROGLYCERIN (NITROSTAT) 0.4 MG SL TABLET] Place 1 tablet (0.4 mg total) under the tongue every 5 (five) minutes as needed for chest pain. 100 tablet 3    omeprazole (PRILOSEC) 20 MG DR capsule TAKE 1 CAPSULE AS NEEDED 90 capsule 3    oxymetazoline (AFRIN) 0.05 % nasal spray [OXYMETAZOLINE (AFRIN) 0.05 % NASAL SPRAY] Apply 2 sprays into each nostril daily as needed for congestion.      psyllium (METAMUCIL) 3.4 gram packet [PSYLLIUM (METAMUCIL) 3.4 GRAM PACKET] Take 1 packet by mouth 2 (two) times a day.      sildenafiL (VIAGRA) 50 MG tablet [SILDENAFIL (VIAGRA) 50 MG TABLET] Take 1 tablet (50 mg total) by mouth as needed. 30 tablet 11    simvastatin (ZOCOR) 40 MG tablet TAKE 1 TABLET AT BEDTIME 90 tablet 3    tadalafil (CIALIS) 10 MG tablet Take 1 tablet (10 mg) by mouth daily as needed 30 tablet 11    traMADol (ULTRAM) 50 mg tablet [TRAMADOL (ULTRAM) 50 MG TABLET] Take 1-2 tablets ( mg total) by mouth every 6 (six) hours as needed for pain. Take 1-2 tablets every 6 hours as needed for pain 15 tablet 1    traZODone (DESYREL) 50 MG tablet [TRAZODONE (DESYREL) 50 MG TABLET] TAKE 1 TABLET AT BEDTIME (Patient taking differently: Take 50 mg by mouth as needed for sleep [TRAZODONE (DESYREL) 50 MG TABLET] TAKE 1 TABLET AT BEDTIME) 90 tablet 3       Objective:   Wt Readings from Last 3 Encounters:   05/17/23 108 kg (238 lb)   06/15/22 105.7 kg (233 lb)   12/15/21 108.4 kg (239 lb)     Vital signs:  /62 (BP Location: Right arm, Patient Position: Sitting, Cuff Size: Adult Large)   Pulse 60   Resp 16   Wt 108 kg (238 lb)   BMI 33.91 kg/m        Physical  Exam:    General Appearance : Awake, Alert, No acute distress  HEENT: No Scleral icterus; the mucous membranes were pink and moist.  Conjunctivae bilaterally injected  Neck:  No cervical bruits, jugular venous distention, or thyromegaly   Chest: The spine was straight. Chest wall symmetric  Lungs: Respirations unlabored; the lungs are clear to auscultation.  No wheezing   Cardiovascular: Nonpalpable point of maximal impulse.  Auscultation reveals regular first and second heart sounds with no murmurs, rubs, or gallops.  Carotid, radial, and dorsalis pedal pulses are intact and symmetric.    Abdomen: Obese.  No organomegaly, masses, bruits, or tenderness. Bowels sounds are present  Extremities:  No clubbing, cyanosis.  1+ pretibial and ankle edema  Skin: No rash, bruising  Musculoskeletal: No tenderness.  Neurologic: Alert and oriented ×3. Speech is fluent.    Lab Results:  LIPIDS:  Lab Results   Component Value Date    CHOL 157 12/15/2021    CHOL 151 06/15/2021    CHOL 160 12/15/2020     Lab Results   Component Value Date    HDL 51 12/15/2021    HDL 44 06/15/2021    HDL 54 12/15/2020     Lab Results   Component Value Date    LDL 80 12/15/2021    LDL 82 06/15/2021    LDL 79 12/15/2020     Lab Results   Component Value Date    TRIG 128 12/15/2021    TRIG 125 06/15/2021    TRIG 137 12/15/2020       Echocardiogram 2018:    Left ventricle ejection fraction is mildly decreased. The calculated left ventricular ejection fraction is 51%.    Normal left ventricular size.    Normal right ventricular size and systolic function.    Aortic Valve: The aortic valve is sclerotic without reduced excursion. Mild aortic regurgitation.    No hemodynamically significant valvular heart abnormalities.    When compared to the previous study dated 1/4/2016, no significant change.            SAYRA PRATHER MD [facility]  [phone]    This note created using Dragon voice recognition software.  Sound alike errors may have escaped  editing.            Thank you for allowing me to participate in the care of your patient.      Sincerely,     Rafal Frost MD     [facility]  cc:   Trenton Boggs MD  [address]  [place] [address]

## 2023-05-31 ASSESSMENT — ENCOUNTER SYMPTOMS
DIZZINESS: 0
WEAKNESS: 0
PARESTHESIAS: 0
PALPITATIONS: 0
DIARRHEA: 0
SORE THROAT: 0
NERVOUS/ANXIOUS: 0
JOINT SWELLING: 0
MYALGIAS: 0
HEMATOCHEZIA: 0
CONSTIPATION: 0
FEVER: 0
NAUSEA: 0
SHORTNESS OF BREATH: 0
HEARTBURN: 1
COUGH: 0
EYE PAIN: 0
FREQUENCY: 0
CHILLS: 0
HEMATURIA: 0
HEADACHES: 0
ARTHRALGIAS: 0
ABDOMINAL PAIN: 0
DYSURIA: 0

## 2023-05-31 ASSESSMENT — ACTIVITIES OF DAILY LIVING (ADL): CURRENT_FUNCTION: NO ASSISTANCE NEEDED

## 2023-06-07 ENCOUNTER — OFFICE VISIT (OUTPATIENT)
Dept: FAMILY MEDICINE | Facility: CLINIC | Age: 78
End: 2023-06-07
Payer: COMMERCIAL

## 2023-06-07 VITALS
HEART RATE: 70 BPM | DIASTOLIC BLOOD PRESSURE: 70 MMHG | HEIGHT: 70 IN | BODY MASS INDEX: 33.21 KG/M2 | TEMPERATURE: 97.5 F | WEIGHT: 232 LBS | RESPIRATION RATE: 18 BRPM | OXYGEN SATURATION: 95 % | SYSTOLIC BLOOD PRESSURE: 132 MMHG

## 2023-06-07 DIAGNOSIS — I44.2 THIRD DEGREE AV BLOCK (H): ICD-10-CM

## 2023-06-07 DIAGNOSIS — Z12.11 COLON CANCER SCREENING: ICD-10-CM

## 2023-06-07 DIAGNOSIS — Z95.810 ICD (IMPLANTABLE CARDIOVERTER-DEFIBRILLATOR), BIVENTRICULAR, IN SITU: ICD-10-CM

## 2023-06-07 DIAGNOSIS — I47.29 NSVT (NONSUSTAINED VENTRICULAR TACHYCARDIA) (H): ICD-10-CM

## 2023-06-07 DIAGNOSIS — D64.9 NORMOCHROMIC NORMOCYTIC ANEMIA: ICD-10-CM

## 2023-06-07 DIAGNOSIS — I10 ESSENTIAL HYPERTENSION: ICD-10-CM

## 2023-06-07 DIAGNOSIS — I42.9 CARDIOMYOPATHY, UNSPECIFIED TYPE (H): ICD-10-CM

## 2023-06-07 DIAGNOSIS — Z00.00 ENCOUNTER FOR MEDICARE ANNUAL WELLNESS EXAM: Primary | ICD-10-CM

## 2023-06-07 DIAGNOSIS — N52.9 ERECTILE DYSFUNCTION, UNSPECIFIED ERECTILE DYSFUNCTION TYPE: ICD-10-CM

## 2023-06-07 DIAGNOSIS — E78.5 HYPERLIPIDEMIA LDL GOAL <70: ICD-10-CM

## 2023-06-07 LAB
ERYTHROCYTE [DISTWIDTH] IN BLOOD BY AUTOMATED COUNT: 12.6 % (ref 10–15)
HCT VFR BLD AUTO: 46.3 % (ref 40–53)
HGB BLD-MCNC: 15.7 G/DL (ref 13.3–17.7)
MCH RBC QN AUTO: 31.7 PG (ref 26.5–33)
MCHC RBC AUTO-ENTMCNC: 33.9 G/DL (ref 31.5–36.5)
MCV RBC AUTO: 93 FL (ref 78–100)
PLATELET # BLD AUTO: 154 10E3/UL (ref 150–450)
RBC # BLD AUTO: 4.96 10E6/UL (ref 4.4–5.9)
WBC # BLD AUTO: 6.1 10E3/UL (ref 4–11)

## 2023-06-07 PROCEDURE — G0439 PPPS, SUBSEQ VISIT: HCPCS | Performed by: FAMILY MEDICINE

## 2023-06-07 PROCEDURE — 85027 COMPLETE CBC AUTOMATED: CPT | Performed by: FAMILY MEDICINE

## 2023-06-07 PROCEDURE — 80061 LIPID PANEL: CPT | Performed by: FAMILY MEDICINE

## 2023-06-07 PROCEDURE — 84443 ASSAY THYROID STIM HORMONE: CPT | Performed by: FAMILY MEDICINE

## 2023-06-07 PROCEDURE — 36415 COLL VENOUS BLD VENIPUNCTURE: CPT | Performed by: FAMILY MEDICINE

## 2023-06-07 PROCEDURE — 99214 OFFICE O/P EST MOD 30 MIN: CPT | Mod: 25 | Performed by: FAMILY MEDICINE

## 2023-06-07 PROCEDURE — 80053 COMPREHEN METABOLIC PANEL: CPT | Performed by: FAMILY MEDICINE

## 2023-06-07 RX ORDER — TADALAFIL 20 MG/1
20 TABLET ORAL DAILY PRN
Qty: 30 TABLET | Refills: 11 | Status: SHIPPED | OUTPATIENT
Start: 2023-06-07 | End: 2024-07-12

## 2023-06-07 RX ORDER — SILDENAFIL 50 MG/1
50 TABLET, FILM COATED ORAL PRN
Qty: 30 TABLET | Refills: 11 | Status: SHIPPED | OUTPATIENT
Start: 2023-06-07 | End: 2024-06-10

## 2023-06-07 ASSESSMENT — ENCOUNTER SYMPTOMS
EYE PAIN: 0
HEMATURIA: 0
HEADACHES: 0
ABDOMINAL PAIN: 0
CHILLS: 0
FREQUENCY: 0
WEAKNESS: 0
DIZZINESS: 0
JOINT SWELLING: 0
PARESTHESIAS: 0
HEARTBURN: 1
PALPITATIONS: 0
DIARRHEA: 0
SHORTNESS OF BREATH: 0
HEMATOCHEZIA: 0
DYSURIA: 0
ARTHRALGIAS: 0
MYALGIAS: 0
NERVOUS/ANXIOUS: 0
NAUSEA: 0
FEVER: 0
SORE THROAT: 0
CONSTIPATION: 0
COUGH: 0

## 2023-06-07 ASSESSMENT — PAIN SCALES - GENERAL: PAINLEVEL: NO PAIN (0)

## 2023-06-07 ASSESSMENT — ACTIVITIES OF DAILY LIVING (ADL): CURRENT_FUNCTION: NO ASSISTANCE NEEDED

## 2023-06-07 NOTE — PROGRESS NOTES
"SUBJECTIVE:     Chaim is a 77 year old who presents for Preventive Visit.      12/15/2021    10:54 AM   Additional Questions   Roomed by      Are you in the first 12 months of your Medicare coverage?  No      Annual wellness visit completed.  Risk questionnaire reviewed in detail.  Suboptimal diet and lack of consistent exercise.  Bilateral hearing loss and now utilizing hearing aids.  Does have history of hypertension.  Lisinopril 5 mg daily metoprolol succinate 150 mg daily.  Simvastatin 40 mg at bedtime for cholesterol management.  History of impaired fasting glucose.  Prior cardiomyopathy with EF 51% historically.  ICD in place secondary to ventricular tachycardia history.  Needs refill on sildenafil and Cialis for male erectile dysfunction.  Had prior colonoscopy 2015 with recommendation for 5-year follow-up with adenomatous colon polyp.  Uncertain if he completed this but doubtful.  History normochromic normocytic anemia.  Comprehensive review of systems as above otherwise all negative.       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 left s/p T & A as child; dual chamber pacemaker placement on 2/9/15 after cardiac arrest.   Quit EtOH 02   ROMELIA on CPAP at 8 cm H2O       Healthy Habits:     In general, how would you rate your overall health?  Good    Frequency of exercise:  1 day/week    Duration of exercise:  Less than 15 minutes    Do you usually eat at least 4 servings of fruit and vegetables a day, include whole grains    & fiber and avoid " "regularly eating high fat or \"junk\" foods?  No    Taking medications regularly:  Yes    Medication side effects:  None    Ability to successfully perform activities of daily living:  No assistance needed    Home Safety:  No safety concerns identified    Hearing Impairment:  Difficulty following a conversation in a noisy restaurant or crowded room, feel that people are mumbling or not speaking clearly, need to ask people to speak up or repeat themselves, find that men's voices are easier to understand than woman's, difficulty understanding soft or whispered speech and difficulty understanding speech on the telephone    In the past 6 months, have you been bothered by leaking of urine?  No    In general, how would you rate your overall mental or emotional health?  Excellent      PHQ-2 Total Score: 0    Additional concerns today:  No        Have you ever done Advance Care Planning? (For example, a Health Directive, POLST, or a discussion with a medical provider or your loved ones about your wishes): No, advance care planning information given to patient to review.  Advanced care planning was discussed at today's visit.       Fall risk  Fallen 2 or more times in the past year?: No  Any fall with injury in the past year?: No    Cognitive Screening   1) Repeat 3 items (Leader, Season, Table)    2) Clock draw: NORMAL  3) 3 item recall: Recalls 3 objects  Results: 3 items recalled: COGNITIVE IMPAIRMENT LESS LIKELY    Mini-CogTM Copyright JOANNA Bernstein. Licensed by the author for use in [facility]; reprinted with permission (vincent@.[facility]). All rights reserved.      Do you have sleep apnea, excessive snoring or daytime drowsiness?: yes    Reviewed and updated as needed this visit by clinical staff   Tobacco  Allergies  Meds  Problems  Med Hx  Surg Hx  Fam Hx          Reviewed and updated as needed this visit by Provider   Tobacco  Allergies  Meds  Problems  Med Hx  Surg Hx  Fam Hx         Social History "     Tobacco Use     Smoking status: Former     Packs/day: 0.00     Types: Cigarettes     Quit date: 6/1/2008     Years since quitting: 15.0     Smokeless tobacco: Never   Vaping Use     Vaping status: Not on file   Substance Use Topics     Alcohol use: No             5/31/2023    11:42 AM   Alcohol Use   Prescreen: >3 drinks/day or >7 drinks/week? Not Applicable          View : No data to display.              Do you have a current opioid prescription? No  Do you use any other controlled substances or medications that are not prescribed by a provider? None              Current providers sharing in care for this patient include:   Patient Care Team:  Adams Garcia MD as PCP - General  Adams Garcia MD as Assigned PCP  Rafal Frost MD as Assigned Heart and Vascular Provider    The following health maintenance items are reviewed in Epic and correct as of today:  Health Maintenance   Topic Date Due     HF ACTION PLAN  Never done     ZOSTER IMMUNIZATION (2 of 3) 10/28/2008     TSH W/FREE T4 REFLEX  09/17/2020     COVID-19 Vaccine (5 - Pfizer series) 09/08/2022     BMP  12/15/2022     LIPID  12/15/2022     CBC  12/15/2022     ALT  06/15/2023     INFLUENZA VACCINE (Season Ended) 09/01/2023     MEDICARE ANNUAL WELLNESS VISIT  06/07/2024     ANNUAL REVIEW OF HM ORDERS  06/07/2024     FALL RISK ASSESSMENT  06/07/2024     ADVANCE CARE PLANNING  06/07/2028     DTAP/TDAP/TD IMMUNIZATION (3 - Td or Tdap) 06/12/2029     HEPATITIS C SCREENING  Completed     PHQ-2 (once per calendar year)  Completed     Pneumococcal Vaccine: 65+ Years  Completed     IPV IMMUNIZATION  Aged Out     MENINGITIS IMMUNIZATION  Aged Out     COLORECTAL CANCER SCREENING  Discontinued     Lab work is in process  Labs reviewed in EPIC  BP Readings from Last 3 Encounters:   06/07/23 132/70   05/17/23 124/62   06/15/22 120/70    Wt Readings from Last 3 Encounters:   06/07/23 105.2 kg (232 lb)   05/17/23 108 kg (238 lb)   06/15/22 105.7 kg (233 lb)                   Patient Active Problem List   Diagnosis     Skin Neoplasm Of Uncertain Behavior     Hypercholesterolemia     Obstructive Sleep Apnea     Esophageal Reflux     Impaired Fasting Glucose     Changed Sexual Interest (Libido): Decreased     Cholelithiasis     Laryngeal Neoplasm Of The Vocal Cord     HTN (hypertension)     Dermatitis     Hoarseness     Male Erectile Disorder     Hypogonadism     Insomnia     Benign Tubular Adenoma Of The Large Intestine     Class 1 obesity due to excess calories with serious comorbidity and body mass index (BMI) of 31.0 to 31.9 in adult     Skin Tag (___ Mm)     Tachycardia     Bradycardia     Heart block AV complete (H)     Right ventricular dysfunction     CAD (coronary artery disease)     Normochromic normocytic anemia     NSVT (nonsustained ventricular tachycardia) (H)     Ischemic cardiomyopathy     Complete AV block, acquired (H)     Adenomatous colon polyp     Tubular adenoma of colon     ROMELIA on CPAP     ICD (implantable cardioverter-defibrillator), biventricular, in situ     Bilateral hearing loss     Erectile dysfunction, unspecified erectile dysfunction type     Cholelithiasis     Paroxysmal atrial fibrillation (H)     Epigastric pain     Symptomatic cholelithiasis     Choledocholithiasis     Hypoxia     Past Surgical History:   Procedure Laterality Date     CARDIAC CATHETERIZATION       COLONOSCOPY N/A 9/30/2015    Procedure: COLONOSCOPY;  Surgeon: Rafiq Gu MD;  Location: [facility] GI;  Service:      HC REMOVE TONSILS/ADENOIDS,<11 Y/O      Description: Tonsillectomy With Adenoidectomy;  Recorded: 02/22/2008;     HERNIA REPAIR      inguinal      INSERT / REPLACE / REMOVE PACEMAKER       LAPAROSCOPIC CHOLECYSTECTOMY N/A 10/2/2019    Procedure: CHOLECYSTECTOMY, LAPAROSCOPIC;  Surgeon: Shin Baird MD;  Location: [facility] OR;  Service: General     OTHER SURGICAL HISTORY  2010    polypremoved during colonoscopy     NV ERCP DX COLLECTION  SPECIMEN BRUSHING/WASHING N/A 5/10/2020    Procedure: ENDOSCOPIC RETROGRADE CHOLANGIOPANCREATOGRAPHY;  Surgeon: Simon Loaiza MD;  Location: [facility];  Service: Gastroenterology     REMOVAL OF SPERM DUCT(S)      Description: Surgery Of Male Genitalia Vasectomy;  Recorded: 02/22/2008;     TONSILLECTOMY       TUMOR REMOVAL  2008    right vocal cord -      XR ERCP BILIARY ONLY  5/10/2020       Social History     Tobacco Use     Smoking status: Former     Packs/day: 0.00     Types: Cigarettes     Quit date: 6/1/2008     Years since quitting: 15.0     Smokeless tobacco: Never   Vaping Use     Vaping status: Not on file   Substance Use Topics     Alcohol use: No     Family History   Problem Relation Age of Onset     Pneumonia Mother      Hypertension Mother      Cancer Father      Chronic Obstructive Pulmonary Disease Sister      Multiple Sclerosis Sister      Acute Myocardial Infarction No family hx of          Current Outpatient Medications   Medication Sig Dispense Refill     aspirin 81 MG EC tablet [ASPIRIN 81 MG EC TABLET] Take 81 mg by mouth daily.       calcium carbonate (CALCIUM CARBONATE) 300 mg (750 mg) Chew [CALCIUM CARBONATE (CALCIUM CARBONATE) 300 MG (750 MG) CHEW] Chew 2-3 tablets as needed (heartburn).              coenzyme Q10 (CO Q-10) 200 mg capsule [COENZYME Q10 (CO Q-10) 200 MG CAPSULE] Take 200 mg by mouth daily.        DIPHENHYDRAMINE HCL (BENADRYL ALLERGY ORAL) [DIPHENHYDRAMINE HCL (BENADRYL ALLERGY ORAL)] Take 25-50 mg by mouth every 6 (six) hours as needed (allergies).              fish oil-omega-3 fatty acids (FISH OIL) 300-1,000 mg capsule [FISH OIL-OMEGA-3 FATTY ACIDS (FISH OIL) 300-1,000 MG CAPSULE] Take 1 g by mouth daily.       latanoprost (XALATAN) 0.005 % ophthalmic solution [LATANOPROST (XALATAN) 0.005 % OPHTHALMIC SOLUTION] Administer 1 drop to both eyes at bedtime.              lisinopril (ZESTRIL) 5 MG tablet Take 1 tablet (5 mg) by mouth daily 90 tablet 2     metoprolol  succinate ER (TOPROL XL) 100 MG 24 hr tablet Take 1 tablet (100 mg) by mouth At Bedtime 90 tablet 3     metoprolol succinate ER (TOPROL XL) 50 MG 24 hr tablet Take 1 tablet (50 mg) by mouth daily In addition to the 100 mg tablet. 90 tablet 3     multivitamin therapeutic tablet [MULTIVITAMIN THERAPEUTIC TABLET] Take 1 tablet by mouth daily.       nitroglycerin (NITROSTAT) 0.4 MG SL tablet [NITROGLYCERIN (NITROSTAT) 0.4 MG SL TABLET] Place 1 tablet (0.4 mg total) under the tongue every 5 (five) minutes as needed for chest pain. 100 tablet 3     omeprazole (PRILOSEC) 20 MG DR capsule TAKE 1 CAPSULE AS NEEDED 90 capsule 3     oxymetazoline (AFRIN) 0.05 % nasal spray [OXYMETAZOLINE (AFRIN) 0.05 % NASAL SPRAY] Apply 2 sprays into each nostril daily as needed for congestion.       psyllium (METAMUCIL) 3.4 gram packet [PSYLLIUM (METAMUCIL) 3.4 GRAM PACKET] Take 1 packet by mouth 2 (two) times a day.       sildenafil (VIAGRA) 50 MG tablet Take 1 tablet (50 mg) by mouth as needed 30 tablet 11     simvastatin (ZOCOR) 40 MG tablet Take 1 tablet (40 mg) by mouth At Bedtime 90 tablet 3     tadalafil (CIALIS) 20 MG tablet Take 1 tablet (20 mg) by mouth daily as needed 30 tablet 11     traMADol (ULTRAM) 50 mg tablet [TRAMADOL (ULTRAM) 50 MG TABLET] Take 1-2 tablets ( mg total) by mouth every 6 (six) hours as needed for pain. Take 1-2 tablets every 6 hours as needed for pain 15 tablet 1     traZODone (DESYREL) 50 MG tablet [TRAZODONE (DESYREL) 50 MG TABLET] TAKE 1 TABLET AT BEDTIME (Patient taking differently: Take 50 mg by mouth as needed for sleep [TRAZODONE (DESYREL) 50 MG TABLET] TAKE 1 TABLET AT BEDTIME) 90 tablet 3     Allergies   Allergen Reactions     Zithromax [Azithromycin] Rash       Declines bivalent COVID-19        Review of Systems   Constitutional: Negative for chills and fever.   HENT: Negative for congestion, ear pain, hearing loss and sore throat.    Eyes: Positive for visual disturbance. Negative for pain.  "  Respiratory: Negative for cough and shortness of breath.    Cardiovascular: Positive for peripheral edema. Negative for chest pain and palpitations.   Gastrointestinal: Positive for heartburn. Negative for abdominal pain, constipation, diarrhea, hematochezia and nausea.   Genitourinary: Positive for impotence. Negative for dysuria, frequency, genital sores, hematuria, penile discharge and urgency.   Musculoskeletal: Negative for arthralgias, joint swelling and myalgias.   Skin: Negative for rash.   Neurological: Negative for dizziness, weakness, headaches and paresthesias.   Psychiatric/Behavioral: Negative for mood changes. The patient is not nervous/anxious.      Constitutional, HEENT, cardiovascular, pulmonary, GI, , musculoskeletal, neuro, skin, endocrine and psych systems are negative, except as otherwise noted.    OBJECTIVE:   /70   Pulse 70   Temp 97.5  F (36.4  C)   Resp 18   Ht 1.778 m (5' 10\")   Wt 105.2 kg (232 lb)   SpO2 95%   BMI 33.29 kg/m   Estimated body mass index is 33.29 kg/m  as calculated from the following:    Height as of this encounter: 1.778 m (5' 10\").    Weight as of this encounter: 105.2 kg (232 lb).  Physical Exam  GENERAL: healthy, alert and no distress  EYES: Eyes grossly normal to inspection, PERRL and conjunctivae and sclerae normal  HENT: ear canals and TM's normal, nose and mouth without ulcers or lesions  NECK: no adenopathy, no asymmetry, masses, or scars and thyroid normal to palpation  RESP: lungs clear to auscultation - no rales, rhonchi or wheezes  CV: regular rate and rhythm, normal S1 S2, no S3 or S4, no murmur, click or rub, no peripheral edema and peripheral pulses strong  ABDOMEN: soft, nontender, no hepatosplenomegaly, no masses and bowel sounds normal   (male): normal male genitalia without lesions or urethral discharge, no hernia  RECTAL: normal sphincter tone, no rectal masses, prostate normal size, smooth, nontender without nodules or masses  MS: " no gross musculoskeletal defects noted, no edema  SKIN: no suspicious lesions or rashes  NEURO: Normal strength and tone, mentation intact and speech normal  PSYCH: mentation appears normal, affect normal/bright    Diagnostic Test Results:  Labs reviewed in Epic  No results found for this or any previous visit (from the past 24 hour(s)).    ASSESSMENT / PLAN:     Encounter for Medicare annual wellness exam  Annual wellness visit completed.  Risk questionnaire reviewed in detail.  Suboptimal diet, lack of consistent exercise as well as hearing loss.  Annual wellness visits to continue.  - PRIMARY CARE FOLLOW-UP SCHEDULING    Essential hypertension  Continues use of lisinopril 5 mg daily and metoprolol succinate 150 mg daily.  - TSH WITH FREE T4 REFLEX  - Comprehensive metabolic panel    Hyperlipidemia LDL goal <70  Continuing simvastatin 40 mg at bedtime.  - Lipid panel reflex to direct LDL Non-fasting  - Comprehensive metabolic panel    Normochromic normocytic anemia  Normochromic normocytic anemia.  Update CBC  - CBC with Platelets    Erectile dysfunction, unspecified erectile dysfunction type  Did provide refill for both tadalafil and sildenafil for male erectile dysfunction.  Uses alternately.  - tadalafil (CIALIS) 20 MG tablet  Dispense: 30 tablet; Refill: 11  - sildenafil (VIAGRA) 50 MG tablet  Dispense: 30 tablet; Refill: 11    Third degree AV block (H)  ICD in place    Cardiomyopathy, unspecified type (H)  Prior EF 51%.  Follows with Dr. Frost.    NSVT (nonsustained ventricular tachycardia) (H)  Denies recurrence of NSVT.    ICD (implantable cardioverter-defibrillator), biventricular, in situ  As above.  ICD in place.    Colon cancer screening  We will confirm need for repeat colonoscopy with adenomatous colon polyp historically  - Colonoscopy Screening  Referral       Patient has been advised of split billing requirements and indicates understanding: Yes      COUNSELING:  Reviewed preventive health  "counseling, as reflected in patient instructions       Regular exercise       Healthy diet/nutrition       Vision screening       Hearing screening       Dental care       Bladder control       Fall risk prevention       Aspirin prophylaxis        Colon cancer screening       Prostate cancer screening      BMI:   Estimated body mass index is 33.29 kg/m  as calculated from the following:    Height as of this encounter: 1.778 m (5' 10\").    Weight as of this encounter: 105.2 kg (232 lb).   Weight management plan: Discussed healthy diet and exercise guidelines      He reports that he quit smoking about 15 years ago. His smoking use included cigarettes. He has never used smokeless tobacco.      Appropriate preventive services were discussed with this patient, including applicable screening as appropriate for cardiovascular disease, diabetes, osteopenia/osteoporosis, and glaucoma.  As appropriate for age/gender, discussed screening for colorectal cancer, prostate cancer, breast cancer, and cervical cancer. Checklist reviewing preventive services available has been given to the patient.    Reviewed patients plan of care and provided an AVS. The Intermediate Care Plan ( asthma action plan, low back pain action plan, and migraine action plan) for Víctor meets the Care Plan requirement. This Care Plan has been established and reviewed with the Patient and spouse.          Adams Garcia MD  [facility]    Identified Health Risks:    I have reviewed Opioid Use Disorder and Substance Use Disorder risk factors and made any needed referrals.       He is at risk for lack of exercise and has been provided with information to increase physical activity for the benefit of his well-being.  The patient was counseled and encouraged to consider modifying their diet and eating habits. He was provided with information on recommended healthy diet options.  The patient was provided with written information regarding " signs of hearing loss.

## 2023-06-07 NOTE — PATIENT INSTRUCTIONS
Patient Education   Personalized Prevention Plan  You are due for the preventive services outlined below.  Your care team is available to assist you in scheduling these services.  If you have already completed any of these items, please share that information with your care team to update in your medical record.  Health Maintenance Due   Topic Date Due     Heart Failure Action Plan  Never done     Zoster (Shingles) Vaccine (2 of 3) 10/28/2008     Thyroid Function Lab  09/17/2020     COVID-19 Vaccine (5 - Pfizer series) 09/08/2022     Basic Metabolic Panel  12/15/2022     Cholesterol Lab  12/15/2022     ANNUAL REVIEW OF HM ORDERS  12/15/2022     Complete Blood Count  12/15/2022     Liver Monitoring Lab  06/15/2023       Exercise for a Healthier Heart  You may wonder how you can improve the health of your heart. If you re thinking about exercise, you re on the right track. You don t need to become an athlete. But you do need a certain amount of brisk exercise to help strengthen your heart. If you have been diagnosed with a heart condition, your healthcare provider may advise exercise to help your condition. To help make exercise a habit, choose safe, fun activities.      Exercise with a friend. When activity is fun, you're more likely to stick with it.     Before you start  Check with your healthcare provider before starting an exercise program. This is especially important if you haven't been active for a while. It's also important if you have a long-term (chronic) health problem such as heart disease, diabetes, or obesity. Also check with your provider if you're at high risk for having these problems.   Why exercise?  Exercising regularly offers many healthy rewards. It can help you do all of these:     Improve your blood cholesterol level to help prevent further heart trouble.    Lower your blood pressure to help prevent a stroke or heart attack.    Control diabetes or reduce your risk of getting this  disease.    Improve your heart and lung function.    Reach and stay at a healthy weight.    Make your muscles stronger so you can stay active.    Prevent falls and fractures by slowing the loss of bone mass (osteoporosis).    Manage stress better.    Improve your sense of self and your body image.  Exercise tips      Ease into your routine. Set small goals. Then build on them. Talk with your healthcare provider first before starting an exercise routine if you're not sure what your activity level should be.    Exercise on most days. Aim for a total of at least 150 minutes (2 hours and 30 minutes) or more of moderate-intensity aerobic activity each week. You could also do 75 minutes (1 hour and 15 minutes) or more of vigorous-intensity aerobic activity each week. Or try for a combination of both. Moderate activity means that you breathe heavier and your heart rate increases, but you can still talk. Think about doing at least 30 minutes of moderate exercise, 5 times a week. It's OK to work up to the 30-minute period over time. Examples of moderate-intensity activity are brisk walking, gardening, and water aerobics.    Step up your daily activity level.  Along with your exercise program, try being more active the whole day. Walk instead of drive. Or park further away so that you take more steps each day. Do more household tasks or yard work. You may not be able to meet the advised amount of physical activity. But doing some moderate- or vigorous-intensity aerobic activity can help reduce your risk for heart disease. Your healthcare provider can help you figure out what is best for you.    Choose 1 or more activities you enjoy.  Walking is one of the easiest things you can do. You can also try swimming, riding a bike, dancing, or taking an exercise class.    Call 911  Call 911 right away if any of these occur:     Chest pain that doesn't go away quickly with rest    New burning, tightness, pressure, or heaviness in your  chest, neck, shoulders, back, or arms    Abnormal or severe shortness of breath    A very fast or irregular heartbeat (palpitations)    Fainting  When to call your healthcare provider  Call your healthcare provider if you have any of these:     Dizziness or lightheadedness    Mild shortness of breath or chest pain    Increased or new joint or muscle pain    Brenda last reviewed this educational content on 7/1/2022 2000-2022 [employer]. All rights reserved. This information is not intended as a substitute for professional medical care. Always follow your healthcare professional's instructions.          Understanding USDA MyPlate  The USDA has guidelines to help you make healthy food choices. These are called MyPlate. MyPlate shows the food groups that make up healthy meals using the image of a place setting. Before you eat, think about the healthiest choices for what to put on your plate or in your cup or bowl. To learn more about building a healthy plate, visit www.choosemyplate.gov.     The food groups    Fruits. Any fruit or 100% fruit juice counts as part of the Fruit Group. Fruits may be fresh, canned, frozen, or dried, and may be whole, cut-up, or pureed. Make 1/2 of your plate fruits and vegetables.    Vegetables. Any vegetable or 100% vegetable juice counts as a member of the Vegetable Group. Vegetables may be fresh, frozen, canned, or dried. They can be served raw or cooked and may be whole, cut-up, or mashed. Make 1/2 of your plate fruits and vegetables.    Grains. All foods made from grains are part of the Grains Group. These include wheat, rice, oats, cornmeal, and barley. Grains are often used to make foods such as bread, pasta, oatmeal, cereal, tortillas, and grits. Grains should be no more than 1/4 of your plate. At least half of your grains should be whole grains.    Protein. This group includes meat, poultry, seafood, beans and peas, eggs, processed soy products (such as tofu),  nuts (including nut butters), and seeds. Make protein choices no more than 1/4 of your plate. Meat and poultry choices should be lean or low fat.    Dairy. The Dairy Group includes all fluid milk products and foods made from milk that contain calcium, such as yogurt and cheese. (Foods that have little calcium, such as cream, butter, and cream cheese, are not part of this group.) Most dairy choices should be low-fat or fat-free.    Oils. Oils aren't a food group, but they do contain essential nutrients. However it's important to watch your intake of oils. These are fats that are liquid at room temperature. They include canola, corn, olive, soybean, vegetable, and sunflower oil. Foods that are mainly oil include mayonnaise, certain salad dressings, and soft margarines. You likely already get your daily oil allowance from the foods you eat.  Things to limit  Eating healthy also means limiting these things in your diet:    Salt (sodium). Many processed foods have a lot of sodium. To keep sodium intake down, eat fresh vegetables, meats, poultry, and seafood when possible. Purchase low-sodium, reduced-sodium, or no-salt-added food products at the store. And don't add salt to your meals at home. Instead, season them with herbs and spices such as dill, oregano, cumin, and paprika. Or try adding flavor with lemon or lime zest and juice.    Saturated fat. Saturated fats are most often found in animal products such as beef, pork, and chicken. They are often solid at room temperature, such as butter. To reduce your saturated fat intake, choose leaner cuts of meat and poultry. And try healthier cooking methods such as grilling, broiling, roasting, or baking. For a simple lower-fat swap, use plain nonfat yogurt instead of mayonnaise when making potato salad or macaroni salad.    Added sugars. These are sugars added to foods. They are in foods such as ice cream, candy, soda, fruit drinks, sports drinks, energy drinks, cookies,  pastries, jams, and syrups. Cut down on added sugars by sharing sweet treats with a family member or friend. You can also choose fruit for dessert, and drink water or other unsweetened beverages.  [employer] last reviewed this educational content on 6/1/2020 2000-2022 [employer]. All rights reserved. This information is not intended as a substitute for professional medical care. Always follow your healthcare professional's instructions.          Signs of Hearing Loss  Hearing loss is a problem shared by many people. In fact, it's one of the most common health problems, particularly as people age. Most people aged 65 and older have some hearing loss. By age 80, almost everyone does. Hearing loss often occurs slowly over the years. So, you may not realize your hearing has gotten worse.   When sudden hearing loss occurs, it's important to contact your healthcare provider right away. Your provider will do a medical exam and a hearing exam as soon as possible. This is to help find the cause and type of your sudden hearing loss. Based on your diagnosis, your healthcare provider will discuss possible treatments.      Hearing much better with one ear can be a sign of hearing loss.     Have your hearing checked  Call your healthcare provider if you:     Have to strain to hear normal conversation    Have to watch other people s faces very carefully to follow what they re saying    Need to ask people to repeat what they ve said    Often misunderstand what people are saying    Turn the volume of the television or radio up so high that others complain    Feel that people are mumbling when they re talking to you    Find that the effort to hear leaves you feeling tired and irritated    Notice, when using the phone, that you hear better with one ear than the other  [employer] last reviewed this educational content on 6/1/2022 2000-2022 [employer]. All rights reserved. This information is not intended  as a substitute for professional medical care. Always follow your healthcare professional's instructions.

## 2023-06-08 LAB
ALBUMIN SERPL BCG-MCNC: 4.4 G/DL (ref 3.5–5.2)
ALP SERPL-CCNC: 64 U/L (ref 40–129)
ALT SERPL W P-5'-P-CCNC: 22 U/L (ref 10–50)
ANION GAP SERPL CALCULATED.3IONS-SCNC: 13 MMOL/L (ref 7–15)
AST SERPL W P-5'-P-CCNC: 20 U/L (ref 10–50)
BILIRUB SERPL-MCNC: 0.8 MG/DL
BUN SERPL-MCNC: 15.9 MG/DL (ref 8–23)
CALCIUM SERPL-MCNC: 9.3 MG/DL (ref 8.8–10.2)
CHLORIDE SERPL-SCNC: 104 MMOL/L (ref 98–107)
CHOLEST SERPL-MCNC: 151 MG/DL
CREAT SERPL-MCNC: 1 MG/DL (ref 0.67–1.17)
DEPRECATED HCO3 PLAS-SCNC: 21 MMOL/L (ref 22–29)
GFR SERPL CREATININE-BSD FRML MDRD: 78 ML/MIN/1.73M2
GLUCOSE SERPL-MCNC: 105 MG/DL (ref 70–99)
HDLC SERPL-MCNC: 47 MG/DL
LDLC SERPL CALC-MCNC: 83 MG/DL
NONHDLC SERPL-MCNC: 104 MG/DL
POTASSIUM SERPL-SCNC: 4.5 MMOL/L (ref 3.4–5.3)
PROT SERPL-MCNC: 7.4 G/DL (ref 6.4–8.3)
SODIUM SERPL-SCNC: 138 MMOL/L (ref 136–145)
TRIGL SERPL-MCNC: 103 MG/DL
TSH SERPL DL<=0.005 MIU/L-ACNC: 2.46 UIU/ML (ref 0.3–4.2)

## 2023-06-10 ENCOUNTER — MYC MEDICAL ADVICE (OUTPATIENT)
Dept: FAMILY MEDICINE | Facility: CLINIC | Age: 78
End: 2023-06-10
Payer: COMMERCIAL

## 2023-06-10 DIAGNOSIS — E29.1 HYPOGONADISM MALE: Primary | ICD-10-CM

## 2023-06-13 RX ORDER — TESTOSTERONE GEL, 1% 10 MG/G
50 GEL TRANSDERMAL DAILY
Qty: 90 PACKET | Refills: 1 | Status: SHIPPED | OUTPATIENT
Start: 2023-06-13 | End: 2024-07-12

## 2023-06-14 ENCOUNTER — TELEPHONE (OUTPATIENT)
Dept: FAMILY MEDICINE | Facility: CLINIC | Age: 78
End: 2023-06-14

## 2023-06-14 NOTE — TELEPHONE ENCOUNTER
PA Initiation    Medication:  testosterone (ANDROGEL/TESTIM) 50 MG/5GM (1%) topical gel  Insurance Company:  [employer]  Pharmacy Filling the Rx:  [facility]  Filling Pharmacy Phone:  [phone]  Filling Pharmacy Fax:  [phone]  Start Date:  6/13/2023

## 2023-06-16 ENCOUNTER — MYC MEDICAL ADVICE (OUTPATIENT)
Dept: CARDIOLOGY | Facility: CLINIC | Age: 78
End: 2023-06-16
Payer: COMMERCIAL

## 2023-06-16 DIAGNOSIS — Z95.810 ICD (IMPLANTABLE CARDIOVERTER-DEFIBRILLATOR), BIVENTRICULAR, IN SITU: ICD-10-CM

## 2023-06-16 DIAGNOSIS — I50.30 HEART FAILURE WITH PRESERVED EJECTION FRACTION, NYHA CLASS II (H): ICD-10-CM

## 2023-06-16 DIAGNOSIS — E78.5 HYPERLIPIDEMIA LDL GOAL <70: ICD-10-CM

## 2023-06-16 DIAGNOSIS — I44.2 THIRD DEGREE AV BLOCK (H): ICD-10-CM

## 2023-06-19 RX ORDER — METOPROLOL SUCCINATE 100 MG/1
100 TABLET, EXTENDED RELEASE ORAL AT BEDTIME
Qty: 90 TABLET | Refills: 3 | Status: SHIPPED | OUTPATIENT
Start: 2023-06-19 | End: 2024-03-14

## 2023-06-19 RX ORDER — SIMVASTATIN 40 MG
40 TABLET ORAL AT BEDTIME
Qty: 90 TABLET | Refills: 3 | Status: SHIPPED | OUTPATIENT
Start: 2023-06-19 | End: 2024-03-14

## 2023-06-19 RX ORDER — METOPROLOL SUCCINATE 50 MG/1
50 TABLET, EXTENDED RELEASE ORAL DAILY
Qty: 90 TABLET | Refills: 3 | Status: SHIPPED | OUTPATIENT
Start: 2023-06-19 | End: 2024-03-14

## 2023-06-19 NOTE — TELEPHONE ENCOUNTER
Central Prior Authorization Team   Phone: [phone]      PA Initiation    Medication: ANDROGEL 50 MG/5GM TD GEL  Insurance Company: [employer] - Phone [phone] Fax [phone]  Pharmacy Filling the Rx: [email or web address] - [address]  Filling Pharmacy Phone: [phone]  Filling Pharmacy Fax:    Start Date: 6/19/2023

## 2023-06-19 NOTE — TELEPHONE ENCOUNTER
PRIOR AUTHORIZATION DENIED    Medication: ANDROGEL 50 MG/5GM TD GEL  Insurance Company: [employer] - Phone [phone] Fax [phone]  Denial Date: 6/19/2023  Denial Rational:         Appeal Information:       Patient Notified: No

## 2023-06-27 ENCOUNTER — HOSPITAL ENCOUNTER (OUTPATIENT)
Dept: CARDIOLOGY | Facility: HOSPITAL | Age: 78
Discharge: HOME | End: 2023-06-27
Attending: INTERNAL MEDICINE | Admitting: INTERNAL MEDICINE
Payer: COMMERCIAL

## 2023-06-27 DIAGNOSIS — I44.2 THIRD DEGREE AV BLOCK (H): ICD-10-CM

## 2023-06-27 DIAGNOSIS — Z95.810 ICD (IMPLANTABLE CARDIOVERTER-DEFIBRILLATOR), BIVENTRICULAR, IN SITU: ICD-10-CM

## 2023-06-27 DIAGNOSIS — I50.30 HEART FAILURE WITH PRESERVED EJECTION FRACTION, NYHA CLASS II (H): ICD-10-CM

## 2023-06-27 LAB — LVEF ECHO: NORMAL

## 2023-06-27 PROCEDURE — 255N000002 HC RX 255 OP 636: Performed by: INTERNAL MEDICINE

## 2023-06-27 PROCEDURE — 93306 TTE W/DOPPLER COMPLETE: CPT | Mod: 26 | Performed by: INTERNAL MEDICINE

## 2023-06-27 RX ADMIN — PERFLUTREN 2 ML: 6.52 INJECTION, SUSPENSION INTRAVENOUS at 14:10

## 2023-06-28 DIAGNOSIS — I50.30 HEART FAILURE WITH PRESERVED EJECTION FRACTION, NYHA CLASS II (H): ICD-10-CM

## 2023-06-28 DIAGNOSIS — E78.5 HYPERLIPIDEMIA LDL GOAL <70: ICD-10-CM

## 2023-06-28 DIAGNOSIS — Z95.810 ICD (IMPLANTABLE CARDIOVERTER-DEFIBRILLATOR), BIVENTRICULAR, IN SITU: Primary | ICD-10-CM

## 2023-06-28 DIAGNOSIS — I50.9 CONGESTIVE HEART FAILURE (CHF) (H): ICD-10-CM

## 2023-06-28 DIAGNOSIS — I44.2 THIRD DEGREE AV BLOCK (H): ICD-10-CM

## 2023-06-28 NOTE — TELEPHONE ENCOUNTER
Noted. Refills sent 6/19/23.  ------------------------------  Rafal Frost MD  You 14 hours ago (4:39 PM)     CC  Please call in prescriptions for metoprolol succinate 50 mg #90, 1 p.o. every morning with 3 refills, metoprolol succinate 100 mg #90, 1 p.o. every morning with 3 refills, and simvastatin 40 mg #90, 1 p.o. nightly with 3 refills.  Thank you! cmc

## 2023-07-11 DIAGNOSIS — I10 ESSENTIAL HYPERTENSION: ICD-10-CM

## 2023-07-11 DIAGNOSIS — I42.9 CARDIOMYOPATHY (H): ICD-10-CM

## 2023-07-12 RX ORDER — LISINOPRIL 5 MG/1
5 TABLET ORAL DAILY
Qty: 90 TABLET | Refills: 3 | Status: SHIPPED | OUTPATIENT
Start: 2023-07-12 | End: 2024-03-14

## 2023-07-12 NOTE — TELEPHONE ENCOUNTER
"Routing refill request to provider for review/approval because:  Early refill requested.    Last Written Prescription Date:  11/25/22  Last Fill Quantity: 90,  # refills: 2   Last office visit provider:  6/7/23     Requested Prescriptions   Pending Prescriptions Disp Refills     lisinopril (ZESTRIL) 5 MG tablet [Pharmacy Med Name: LISINOPRIL TABS 5MG] 90 tablet 3     Sig: TAKE 1 TABLET DAILY       ACE Inhibitors (Including Combos) Protocol Passed - 7/11/2023  7:53 PM        Passed - Blood pressure under 140/90 in past 12 months     BP Readings from Last 3 Encounters:   06/07/23 132/70   05/17/23 124/62   06/15/22 120/70                 Passed - Recent (12 mo) or future (30 days) visit within the authorizing provider's specialty     Patient has had an office visit with the authorizing provider or a provider within the authorizing providers department within the previous 12 mos or has a future within next 30 days. See \"Patient Info\" tab in inbasket, or \"Choose Columns\" in Meds & Orders section of the refill encounter.              Passed - Medication is active on med list        Passed - Patient is age 18 or older        Passed - Normal serum creatinine on file in past 12 months     Recent Labs   Lab Test 06/07/23  1550   CR 1.00       Ok to refill medication if creatinine is low          Passed - Normal serum potassium on file in past 12 months     Recent Labs   Lab Test 06/07/23  1550   POTASSIUM 4.5                  Simon Graff RN 07/12/23 9:58 AM  "

## 2023-07-21 ENCOUNTER — MYC MEDICAL ADVICE (OUTPATIENT)
Dept: FAMILY MEDICINE | Facility: CLINIC | Age: 78
End: 2023-07-21
Payer: COMMERCIAL

## 2023-07-21 DIAGNOSIS — E29.1 HYPOGONADISM MALE: Primary | ICD-10-CM

## 2023-08-18 ENCOUNTER — ANCILLARY PROCEDURE (OUTPATIENT)
Dept: CARDIOLOGY | Facility: CLINIC | Age: 78
End: 2023-08-18
Attending: INTERNAL MEDICINE
Payer: COMMERCIAL

## 2023-08-18 DIAGNOSIS — Z95.810 BIVENTRICULAR ICD (IMPLANTABLE CARDIOVERTER-DEFIBRILLATOR) IN PLACE: ICD-10-CM

## 2023-08-18 DIAGNOSIS — I50.9 CHF (CONGESTIVE HEART FAILURE) (H): ICD-10-CM

## 2023-08-18 DIAGNOSIS — I25.5 ISCHEMIC CARDIOMYOPATHY: ICD-10-CM

## 2023-08-24 LAB
MDC_IDC_EPISODE_DTM: NORMAL
MDC_IDC_EPISODE_ID: NORMAL
MDC_IDC_EPISODE_TYPE: NORMAL
MDC_IDC_LEAD_IMPLANT_DT: NORMAL
MDC_IDC_LEAD_LOCATION: NORMAL
MDC_IDC_LEAD_LOCATION_DETAIL_1: NORMAL
MDC_IDC_LEAD_MFG: NORMAL
MDC_IDC_LEAD_MODEL: NORMAL
MDC_IDC_LEAD_POLARITY_TYPE: NORMAL
MDC_IDC_LEAD_SERIAL: NORMAL
MDC_IDC_LEAD_SPECIAL_FUNCTION: NORMAL
MDC_IDC_MSMT_BATTERY_DTM: NORMAL
MDC_IDC_MSMT_BATTERY_REMAINING_LONGEVITY: 30 MO
MDC_IDC_MSMT_BATTERY_REMAINING_PERCENTAGE: 45 %
MDC_IDC_MSMT_BATTERY_STATUS: NORMAL
MDC_IDC_MSMT_CAP_CHARGE_DTM: NORMAL
MDC_IDC_MSMT_CAP_CHARGE_DTM: NORMAL
MDC_IDC_MSMT_CAP_CHARGE_ENERGY: 41 J
MDC_IDC_MSMT_CAP_CHARGE_TIME: 13 S
MDC_IDC_MSMT_CAP_CHARGE_TIME: 8.8 S
MDC_IDC_MSMT_CAP_CHARGE_TYPE: NORMAL
MDC_IDC_MSMT_CAP_CHARGE_TYPE: NORMAL
MDC_IDC_MSMT_LEADCHNL_LV_IMPEDANCE_VALUE: 865 OHM
MDC_IDC_MSMT_LEADCHNL_LV_IMPEDANCE_VALUE: 865 OHM
MDC_IDC_MSMT_LEADCHNL_LV_PACING_THRESHOLD_AMPLITUDE: 0.8 V
MDC_IDC_MSMT_LEADCHNL_LV_PACING_THRESHOLD_PULSEWIDTH: 0.5 MS
MDC_IDC_MSMT_LEADCHNL_RA_IMPEDANCE_VALUE: 383 OHM
MDC_IDC_MSMT_LEADCHNL_RA_PACING_THRESHOLD_AMPLITUDE: 0.7 V
MDC_IDC_MSMT_LEADCHNL_RA_PACING_THRESHOLD_PULSEWIDTH: 0.5 MS
MDC_IDC_MSMT_LEADCHNL_RV_IMPEDANCE_VALUE: 406 OHM
MDC_IDC_MSMT_LEADCHNL_RV_PACING_THRESHOLD_AMPLITUDE: 1 V
MDC_IDC_MSMT_LEADCHNL_RV_PACING_THRESHOLD_PULSEWIDTH: 0.5 MS
MDC_IDC_PG_IMPLANT_DTM: NORMAL
MDC_IDC_PG_MFG: NORMAL
MDC_IDC_PG_MODEL: NORMAL
MDC_IDC_PG_SERIAL: NORMAL
MDC_IDC_PG_TYPE: NORMAL
MDC_IDC_SESS_CLINIC_NAME: NORMAL
MDC_IDC_SESS_DTM: NORMAL
MDC_IDC_SESS_TYPE: NORMAL
MDC_IDC_SET_BRADY_AT_MODE_SWITCH_MODE: NORMAL
MDC_IDC_SET_BRADY_AT_MODE_SWITCH_RATE: 170 {BEATS}/MIN
MDC_IDC_SET_BRADY_LOWRATE: 60 {BEATS}/MIN
MDC_IDC_SET_BRADY_MAX_SENSOR_RATE: 130 {BEATS}/MIN
MDC_IDC_SET_BRADY_MAX_TRACKING_RATE: 140 {BEATS}/MIN
MDC_IDC_SET_BRADY_MODE: NORMAL
MDC_IDC_SET_BRADY_PAV_DELAY_LOW: 180 MS
MDC_IDC_SET_BRADY_SAV_DELAY_LOW: 150 MS
MDC_IDC_SET_CRT_LVRV_DELAY: 0 MS
MDC_IDC_SET_CRT_PACED_CHAMBERS: NORMAL
MDC_IDC_SET_LEADCHNL_LV_PACING_AMPLITUDE: 2 V
MDC_IDC_SET_LEADCHNL_LV_PACING_ANODE_ELECTRODE_1: NORMAL
MDC_IDC_SET_LEADCHNL_LV_PACING_ANODE_LOCATION_1: NORMAL
MDC_IDC_SET_LEADCHNL_LV_PACING_CATHODE_ELECTRODE_1: NORMAL
MDC_IDC_SET_LEADCHNL_LV_PACING_CATHODE_LOCATION_1: NORMAL
MDC_IDC_SET_LEADCHNL_LV_PACING_PULSEWIDTH: 0.5 MS
MDC_IDC_SET_LEADCHNL_LV_SENSING_ADAPTATION_MODE: NORMAL
MDC_IDC_SET_LEADCHNL_LV_SENSING_ANODE_ELECTRODE_1: NORMAL
MDC_IDC_SET_LEADCHNL_LV_SENSING_ANODE_LOCATION_1: NORMAL
MDC_IDC_SET_LEADCHNL_LV_SENSING_CATHODE_ELECTRODE_1: NORMAL
MDC_IDC_SET_LEADCHNL_LV_SENSING_CATHODE_LOCATION_1: NORMAL
MDC_IDC_SET_LEADCHNL_LV_SENSING_SENSITIVITY: 1 MV
MDC_IDC_SET_LEADCHNL_RA_PACING_AMPLITUDE: 1.2 V
MDC_IDC_SET_LEADCHNL_RA_PACING_POLARITY: NORMAL
MDC_IDC_SET_LEADCHNL_RA_PACING_PULSEWIDTH: 0.5 MS
MDC_IDC_SET_LEADCHNL_RA_SENSING_ADAPTATION_MODE: NORMAL
MDC_IDC_SET_LEADCHNL_RA_SENSING_POLARITY: NORMAL
MDC_IDC_SET_LEADCHNL_RA_SENSING_SENSITIVITY: 0.25 MV
MDC_IDC_SET_LEADCHNL_RV_PACING_AMPLITUDE: 2 V
MDC_IDC_SET_LEADCHNL_RV_PACING_POLARITY: NORMAL
MDC_IDC_SET_LEADCHNL_RV_PACING_PULSEWIDTH: 0.5 MS
MDC_IDC_SET_LEADCHNL_RV_SENSING_ADAPTATION_MODE: NORMAL
MDC_IDC_SET_LEADCHNL_RV_SENSING_POLARITY: NORMAL
MDC_IDC_SET_LEADCHNL_RV_SENSING_SENSITIVITY: 0.6 MV
MDC_IDC_SET_ZONE_DETECTION_INTERVAL: 300 MS
MDC_IDC_SET_ZONE_DETECTION_INTERVAL: 353 MS
MDC_IDC_SET_ZONE_TYPE: NORMAL
MDC_IDC_SET_ZONE_TYPE: NORMAL
MDC_IDC_SET_ZONE_VENDOR_TYPE: NORMAL
MDC_IDC_SET_ZONE_VENDOR_TYPE: NORMAL
MDC_IDC_STAT_AT_BURDEN_PERCENT: 0 %
MDC_IDC_STAT_AT_DTM_END: NORMAL
MDC_IDC_STAT_AT_DTM_START: NORMAL
MDC_IDC_STAT_BRADY_DTM_END: NORMAL
MDC_IDC_STAT_BRADY_DTM_START: NORMAL
MDC_IDC_STAT_BRADY_RA_PERCENT_PACED: 22 %
MDC_IDC_STAT_BRADY_RV_PERCENT_PACED: 93 %
MDC_IDC_STAT_CRT_DTM_END: NORMAL
MDC_IDC_STAT_CRT_DTM_START: NORMAL
MDC_IDC_STAT_CRT_LV_PERCENT_PACED: 93 %
MDC_IDC_STAT_EPISODE_RECENT_COUNT: 0
MDC_IDC_STAT_EPISODE_RECENT_COUNT_DTM_END: NORMAL
MDC_IDC_STAT_EPISODE_RECENT_COUNT_DTM_START: NORMAL
MDC_IDC_STAT_EPISODE_TYPE: NORMAL
MDC_IDC_STAT_EPISODE_VENDOR_TYPE: NORMAL
MDC_IDC_STAT_TACHYTHERAPY_ATP_DELIVERED_RECENT: 0
MDC_IDC_STAT_TACHYTHERAPY_ATP_DELIVERED_TOTAL: 1
MDC_IDC_STAT_TACHYTHERAPY_RECENT_DTM_END: NORMAL
MDC_IDC_STAT_TACHYTHERAPY_RECENT_DTM_START: NORMAL
MDC_IDC_STAT_TACHYTHERAPY_SHOCKS_ABORTED_RECENT: 0
MDC_IDC_STAT_TACHYTHERAPY_SHOCKS_ABORTED_TOTAL: 1
MDC_IDC_STAT_TACHYTHERAPY_SHOCKS_DELIVERED_RECENT: 0
MDC_IDC_STAT_TACHYTHERAPY_SHOCKS_DELIVERED_TOTAL: 2
MDC_IDC_STAT_TACHYTHERAPY_TOTAL_DTM_END: NORMAL
MDC_IDC_STAT_TACHYTHERAPY_TOTAL_DTM_START: NORMAL

## 2023-08-24 PROCEDURE — 93296 REM INTERROG EVL PM/IDS: CPT | Performed by: INTERNAL MEDICINE

## 2023-08-24 PROCEDURE — 93295 DEV INTERROG REMOTE 1/2/MLT: CPT | Performed by: INTERNAL MEDICINE

## 2023-10-12 DIAGNOSIS — K21.9 GERD (GASTROESOPHAGEAL REFLUX DISEASE): ICD-10-CM

## 2023-11-17 ENCOUNTER — ANCILLARY PROCEDURE (OUTPATIENT)
Dept: CARDIOLOGY | Facility: CLINIC | Age: 78
End: 2023-11-17
Attending: INTERNAL MEDICINE
Payer: COMMERCIAL

## 2023-11-17 DIAGNOSIS — I50.9 CHF (CONGESTIVE HEART FAILURE) (H): ICD-10-CM

## 2023-11-17 DIAGNOSIS — Z95.810 BIVENTRICULAR ICD (IMPLANTABLE CARDIOVERTER-DEFIBRILLATOR) IN PLACE: ICD-10-CM

## 2023-11-17 DIAGNOSIS — I25.5 ISCHEMIC CARDIOMYOPATHY: ICD-10-CM

## 2023-11-17 PROCEDURE — 99207 CARDIAC DEVICE CHECK - REMOTE: CPT | Performed by: INTERNAL MEDICINE

## 2023-11-27 LAB
MDC_IDC_LEAD_CONNECTION_STATUS: NORMAL
MDC_IDC_LEAD_IMPLANT_DT: NORMAL
MDC_IDC_LEAD_LOCATION: NORMAL
MDC_IDC_LEAD_LOCATION_DETAIL_1: NORMAL
MDC_IDC_LEAD_MFG: NORMAL
MDC_IDC_LEAD_MODEL: NORMAL
MDC_IDC_LEAD_POLARITY_TYPE: NORMAL
MDC_IDC_LEAD_SERIAL: NORMAL
MDC_IDC_LEAD_SPECIAL_FUNCTION: NORMAL
MDC_IDC_MSMT_BATTERY_DTM: NORMAL
MDC_IDC_MSMT_BATTERY_REMAINING_LONGEVITY: 30 MO
MDC_IDC_MSMT_BATTERY_REMAINING_PERCENTAGE: 42 %
MDC_IDC_MSMT_BATTERY_STATUS: NORMAL
MDC_IDC_MSMT_CAP_CHARGE_DTM: NORMAL
MDC_IDC_MSMT_CAP_CHARGE_DTM: NORMAL
MDC_IDC_MSMT_CAP_CHARGE_ENERGY: 41 J
MDC_IDC_MSMT_CAP_CHARGE_TIME: 13 S
MDC_IDC_MSMT_CAP_CHARGE_TIME: 8.8 S
MDC_IDC_MSMT_CAP_CHARGE_TYPE: NORMAL
MDC_IDC_MSMT_CAP_CHARGE_TYPE: NORMAL
MDC_IDC_MSMT_LEADCHNL_LV_IMPEDANCE_VALUE: 806 OHM
MDC_IDC_MSMT_LEADCHNL_LV_PACING_THRESHOLD_AMPLITUDE: 0.8 V
MDC_IDC_MSMT_LEADCHNL_LV_PACING_THRESHOLD_PULSEWIDTH: 0.5 MS
MDC_IDC_MSMT_LEADCHNL_RA_IMPEDANCE_VALUE: 363 OHM
MDC_IDC_MSMT_LEADCHNL_RA_PACING_THRESHOLD_AMPLITUDE: 0.7 V
MDC_IDC_MSMT_LEADCHNL_RA_PACING_THRESHOLD_PULSEWIDTH: 0.5 MS
MDC_IDC_MSMT_LEADCHNL_RV_IMPEDANCE_VALUE: 384 OHM
MDC_IDC_MSMT_LEADCHNL_RV_PACING_THRESHOLD_AMPLITUDE: 1 V
MDC_IDC_MSMT_LEADCHNL_RV_PACING_THRESHOLD_PULSEWIDTH: 0.5 MS
MDC_IDC_PG_IMPLANT_DTM: NORMAL
MDC_IDC_PG_MFG: NORMAL
MDC_IDC_PG_MODEL: NORMAL
MDC_IDC_PG_SERIAL: NORMAL
MDC_IDC_PG_TYPE: NORMAL
MDC_IDC_SESS_CLINIC_NAME: NORMAL
MDC_IDC_SESS_DTM: NORMAL
MDC_IDC_SESS_TYPE: NORMAL
MDC_IDC_SET_BRADY_AT_MODE_SWITCH_MODE: NORMAL
MDC_IDC_SET_BRADY_AT_MODE_SWITCH_RATE: 170 {BEATS}/MIN
MDC_IDC_SET_BRADY_LOWRATE: 60 {BEATS}/MIN
MDC_IDC_SET_BRADY_MAX_SENSOR_RATE: 130 {BEATS}/MIN
MDC_IDC_SET_BRADY_MAX_TRACKING_RATE: 140 {BEATS}/MIN
MDC_IDC_SET_BRADY_MODE: NORMAL
MDC_IDC_SET_BRADY_PAV_DELAY_LOW: 180 MS
MDC_IDC_SET_BRADY_SAV_DELAY_LOW: 150 MS
MDC_IDC_SET_CRT_LVRV_DELAY: 0 MS
MDC_IDC_SET_CRT_PACED_CHAMBERS: NORMAL
MDC_IDC_SET_LEADCHNL_LV_PACING_AMPLITUDE: 2 V
MDC_IDC_SET_LEADCHNL_LV_PACING_ANODE_ELECTRODE_1: NORMAL
MDC_IDC_SET_LEADCHNL_LV_PACING_ANODE_LOCATION_1: NORMAL
MDC_IDC_SET_LEADCHNL_LV_PACING_CATHODE_ELECTRODE_1: NORMAL
MDC_IDC_SET_LEADCHNL_LV_PACING_CATHODE_LOCATION_1: NORMAL
MDC_IDC_SET_LEADCHNL_LV_PACING_PULSEWIDTH: 0.5 MS
MDC_IDC_SET_LEADCHNL_LV_SENSING_ADAPTATION_MODE: NORMAL
MDC_IDC_SET_LEADCHNL_LV_SENSING_ANODE_ELECTRODE_1: NORMAL
MDC_IDC_SET_LEADCHNL_LV_SENSING_ANODE_LOCATION_1: NORMAL
MDC_IDC_SET_LEADCHNL_LV_SENSING_CATHODE_ELECTRODE_1: NORMAL
MDC_IDC_SET_LEADCHNL_LV_SENSING_CATHODE_LOCATION_1: NORMAL
MDC_IDC_SET_LEADCHNL_LV_SENSING_SENSITIVITY: 1 MV
MDC_IDC_SET_LEADCHNL_RA_PACING_AMPLITUDE: 1.2 V
MDC_IDC_SET_LEADCHNL_RA_PACING_POLARITY: NORMAL
MDC_IDC_SET_LEADCHNL_RA_PACING_PULSEWIDTH: 0.5 MS
MDC_IDC_SET_LEADCHNL_RA_SENSING_ADAPTATION_MODE: NORMAL
MDC_IDC_SET_LEADCHNL_RA_SENSING_POLARITY: NORMAL
MDC_IDC_SET_LEADCHNL_RA_SENSING_SENSITIVITY: 0.25 MV
MDC_IDC_SET_LEADCHNL_RV_PACING_AMPLITUDE: 2 V
MDC_IDC_SET_LEADCHNL_RV_PACING_POLARITY: NORMAL
MDC_IDC_SET_LEADCHNL_RV_PACING_PULSEWIDTH: 0.5 MS
MDC_IDC_SET_LEADCHNL_RV_SENSING_ADAPTATION_MODE: NORMAL
MDC_IDC_SET_LEADCHNL_RV_SENSING_POLARITY: NORMAL
MDC_IDC_SET_LEADCHNL_RV_SENSING_SENSITIVITY: 0.6 MV
MDC_IDC_SET_ZONE_DETECTION_INTERVAL: 300 MS
MDC_IDC_SET_ZONE_DETECTION_INTERVAL: 353 MS
MDC_IDC_SET_ZONE_STATUS: NORMAL
MDC_IDC_SET_ZONE_STATUS: NORMAL
MDC_IDC_SET_ZONE_TYPE: NORMAL
MDC_IDC_SET_ZONE_VENDOR_TYPE: NORMAL
MDC_IDC_SET_ZONE_VENDOR_TYPE: NORMAL
MDC_IDC_STAT_AT_BURDEN_PERCENT: 0 %
MDC_IDC_STAT_AT_DTM_END: NORMAL
MDC_IDC_STAT_AT_DTM_START: NORMAL
MDC_IDC_STAT_BRADY_DTM_END: NORMAL
MDC_IDC_STAT_BRADY_DTM_START: NORMAL
MDC_IDC_STAT_BRADY_RA_PERCENT_PACED: 16 %
MDC_IDC_STAT_BRADY_RV_PERCENT_PACED: 95 %
MDC_IDC_STAT_CRT_DTM_END: NORMAL
MDC_IDC_STAT_CRT_DTM_START: NORMAL
MDC_IDC_STAT_CRT_LV_PERCENT_PACED: 95 %
MDC_IDC_STAT_EPISODE_RECENT_COUNT: 0
MDC_IDC_STAT_EPISODE_RECENT_COUNT_DTM_END: NORMAL
MDC_IDC_STAT_EPISODE_RECENT_COUNT_DTM_START: NORMAL
MDC_IDC_STAT_EPISODE_TYPE: NORMAL
MDC_IDC_STAT_EPISODE_VENDOR_TYPE: NORMAL
MDC_IDC_STAT_TACHYTHERAPY_ATP_DELIVERED_RECENT: 0
MDC_IDC_STAT_TACHYTHERAPY_ATP_DELIVERED_TOTAL: 1
MDC_IDC_STAT_TACHYTHERAPY_RECENT_DTM_END: NORMAL
MDC_IDC_STAT_TACHYTHERAPY_RECENT_DTM_START: NORMAL
MDC_IDC_STAT_TACHYTHERAPY_SHOCKS_ABORTED_RECENT: 0
MDC_IDC_STAT_TACHYTHERAPY_SHOCKS_ABORTED_TOTAL: 1
MDC_IDC_STAT_TACHYTHERAPY_SHOCKS_DELIVERED_RECENT: 0
MDC_IDC_STAT_TACHYTHERAPY_SHOCKS_DELIVERED_TOTAL: 2
MDC_IDC_STAT_TACHYTHERAPY_TOTAL_DTM_END: NORMAL
MDC_IDC_STAT_TACHYTHERAPY_TOTAL_DTM_START: NORMAL

## 2023-12-07 ENCOUNTER — OFFICE VISIT (OUTPATIENT)
Dept: FAMILY MEDICINE | Facility: CLINIC | Age: 78
End: 2023-12-07
Payer: COMMERCIAL

## 2023-12-07 VITALS
SYSTOLIC BLOOD PRESSURE: 136 MMHG | BODY MASS INDEX: 36.36 KG/M2 | HEART RATE: 80 BPM | OXYGEN SATURATION: 95 % | RESPIRATION RATE: 16 BRPM | TEMPERATURE: 97.7 F | DIASTOLIC BLOOD PRESSURE: 78 MMHG | WEIGHT: 254 LBS | HEIGHT: 70 IN

## 2023-12-07 DIAGNOSIS — E66.01 CLASS 2 SEVERE OBESITY DUE TO EXCESS CALORIES WITH SERIOUS COMORBIDITY AND BODY MASS INDEX (BMI) OF 36.0 TO 36.9 IN ADULT (H): ICD-10-CM

## 2023-12-07 DIAGNOSIS — I42.9 CARDIOMYOPATHY, UNSPECIFIED TYPE (H): ICD-10-CM

## 2023-12-07 DIAGNOSIS — R73.01 IMPAIRED FASTING GLUCOSE: ICD-10-CM

## 2023-12-07 DIAGNOSIS — K21.9 GASTROESOPHAGEAL REFLUX DISEASE WITHOUT ESOPHAGITIS: ICD-10-CM

## 2023-12-07 DIAGNOSIS — E78.00 PURE HYPERCHOLESTEROLEMIA: ICD-10-CM

## 2023-12-07 DIAGNOSIS — E66.812 CLASS 2 SEVERE OBESITY DUE TO EXCESS CALORIES WITH SERIOUS COMORBIDITY AND BODY MASS INDEX (BMI) OF 36.0 TO 36.9 IN ADULT (H): ICD-10-CM

## 2023-12-07 DIAGNOSIS — I10 ESSENTIAL HYPERTENSION: Primary | ICD-10-CM

## 2023-12-07 LAB
ALBUMIN SERPL BCG-MCNC: 4.4 G/DL (ref 3.5–5.2)
ALP SERPL-CCNC: 60 U/L (ref 40–150)
ALT SERPL W P-5'-P-CCNC: 24 U/L (ref 0–70)
ANION GAP SERPL CALCULATED.3IONS-SCNC: 12 MMOL/L (ref 7–15)
AST SERPL W P-5'-P-CCNC: 25 U/L (ref 0–45)
BILIRUB SERPL-MCNC: 0.8 MG/DL
BUN SERPL-MCNC: 14.1 MG/DL (ref 8–23)
CALCIUM SERPL-MCNC: 9.6 MG/DL (ref 8.8–10.2)
CHLORIDE SERPL-SCNC: 102 MMOL/L (ref 98–107)
CHOLEST SERPL-MCNC: 168 MG/DL
CREAT SERPL-MCNC: 1.19 MG/DL (ref 0.67–1.17)
DEPRECATED HCO3 PLAS-SCNC: 26 MMOL/L (ref 22–29)
EGFRCR SERPLBLD CKD-EPI 2021: 63 ML/MIN/1.73M2
ERYTHROCYTE [DISTWIDTH] IN BLOOD BY AUTOMATED COUNT: 12.3 % (ref 10–15)
FASTING STATUS PATIENT QL REPORTED: YES
GLUCOSE SERPL-MCNC: 99 MG/DL (ref 70–99)
HBA1C MFR BLD: 5.6 % (ref 0–5.6)
HCT VFR BLD AUTO: 43.9 % (ref 40–53)
HDLC SERPL-MCNC: 45 MG/DL
HGB BLD-MCNC: 15 G/DL (ref 13.3–17.7)
LDLC SERPL CALC-MCNC: 99 MG/DL
MCH RBC QN AUTO: 31.8 PG (ref 26.5–33)
MCHC RBC AUTO-ENTMCNC: 34.2 G/DL (ref 31.5–36.5)
MCV RBC AUTO: 93 FL (ref 78–100)
NONHDLC SERPL-MCNC: 123 MG/DL
PLATELET # BLD AUTO: 152 10E3/UL (ref 150–450)
POTASSIUM SERPL-SCNC: 3.9 MMOL/L (ref 3.4–5.3)
PROT SERPL-MCNC: 7.3 G/DL (ref 6.4–8.3)
RBC # BLD AUTO: 4.71 10E6/UL (ref 4.4–5.9)
SODIUM SERPL-SCNC: 140 MMOL/L (ref 135–145)
TRIGL SERPL-MCNC: 122 MG/DL
VIT B12 SERPL-MCNC: 750 PG/ML (ref 232–1245)
WBC # BLD AUTO: 6 10E3/UL (ref 4–11)

## 2023-12-07 PROCEDURE — 36415 COLL VENOUS BLD VENIPUNCTURE: CPT | Performed by: FAMILY MEDICINE

## 2023-12-07 PROCEDURE — 80061 LIPID PANEL: CPT | Performed by: FAMILY MEDICINE

## 2023-12-07 PROCEDURE — 83036 HEMOGLOBIN GLYCOSYLATED A1C: CPT | Performed by: FAMILY MEDICINE

## 2023-12-07 PROCEDURE — 80053 COMPREHEN METABOLIC PANEL: CPT | Performed by: FAMILY MEDICINE

## 2023-12-07 PROCEDURE — 99214 OFFICE O/P EST MOD 30 MIN: CPT | Performed by: FAMILY MEDICINE

## 2023-12-07 PROCEDURE — 85027 COMPLETE CBC AUTOMATED: CPT | Performed by: FAMILY MEDICINE

## 2023-12-07 PROCEDURE — 82607 VITAMIN B-12: CPT | Performed by: FAMILY MEDICINE

## 2023-12-07 RX ORDER — RESPIRATORY SYNCYTIAL VIRUS VACCINE 120MCG/0.5
0.5 KIT INTRAMUSCULAR ONCE
Qty: 1 EACH | Refills: 0 | Status: CANCELLED | OUTPATIENT
Start: 2023-12-07 | End: 2023-12-07

## 2023-12-07 ASSESSMENT — PAIN SCALES - GENERAL: PAINLEVEL: NO PAIN (0)

## 2023-12-07 NOTE — PROGRESS NOTES
Assessment/Plan:    Essential hypertension  Hypertension appears well-controlled blood pressure 136/78 continue metoprolol succinate 150 mg daily and lisinopril 5 mg daily.  - Comprehensive metabolic panel    Class 2 severe obesity due to excess calories with serious comorbidity and body mass index (BMI) of 36.0 to 36.9 in adult (H)  Dietary and exercise modification for weight goal less than 240 pounds initially, less than 220 pounds ideally.    Hypercholesterolemia  Hypercholesterolemia reviewed.  Simvastatin 40 mg at bedtime.  Update lipid cascade today while fasting.  - Lipid panel reflex to direct LDL Fasting    Cardiomyopathy, unspecified type (H)  Prior EF 45%.  ICD in place with history nonsustained ventricular tachycardia historically.  Check B12, CBC and comprehensive metabolic panel.  - Vitamin B12  - CBC with platelets  - Comprehensive metabolic panel    Gastroesophageal reflux disease without esophagitis  Utilizing omeprazole 20 mg daily.  Uses Tums tablets on as-needed basis for breakthrough symptoms.  Update B12 level.  - Vitamin B12    Impaired fasting glucose  A1c and fasting glucose obtained today.  Weight goal less than 240 pounds initially, less than 220 pounds ideally.  - Comprehensive metabolic panel  - Hemoglobin A1c               Subjective:    Víctor Calderon is seen today for follow-up assessment.  Hypertension.  Lisinopril 5 mg daily metoprolol succinate 150 mg daily.  Simvastatin 40 mg at bedtime.  History of third-degree AV block.  ICD in place with history nonsustained ventricular tachycardia.  Most recent EF around 45%.  Has been seen by Dr. Blanchard in the past.  Omeprazole 20 mg daily for reflux.  Occasionally uses Tums tablets.  Describes mild emphysema diagnosed in 2015 which apparently remained stable.  Right upper quadrant pain lasting 2 to 3 months which has resolved.  Thought that it was from lifting something too heavy.  Has had gallbladder out in the past as well as issues with  "bile leakage following.  Comprehensive review of systems as above otherwise all negative.     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 left s/p T & A as child; dual chamber pacemaker placement on 2/9/15 after cardiac arrest.   Quit EtOH 02   ROMELIA on CPAP at 8 cm H2O       Past Surgical History:   Procedure Laterality Date    CARDIAC CATHETERIZATION      COLONOSCOPY N/A 2015    Procedure: COLONOSCOPY;  Surgeon: Rafiq Gu MD;  Location: [facility];  Service:     HC REMOVE TONSILS/ADENOIDS,<11 Y/O      Description: Tonsillectomy With Adenoidectomy;  Recorded: 2008;    HERNIA REPAIR      inguinal     INSERT / REPLACE / REMOVE PACEMAKER      LAPAROSCOPIC CHOLECYSTECTOMY N/A 10/2/2019    Procedure: CHOLECYSTECTOMY, LAPAROSCOPIC;  Surgeon: Shin Baird MD;  Location: [facility];  Service: General    OTHER SURGICAL HISTORY  2010    polypremoved during colonoscopy    WI ERCP DX COLLECTION SPECIMEN BRUSHING/WASHING N/A 5/10/2020    Procedure: ENDOSCOPIC RETROGRADE CHOLANGIOPANCREATOGRAPHY;  Surgeon: Simon Loaiza MD;  Location: [facility];  Service: Gastroenterology    REMOVAL OF SPERM DUCT(S)      Description: Surgery Of Male Genitalia Vasectomy;  Recorded: 2008;    TONSILLECTOMY      TUMOR REMOVAL      right vocal cord -     XR ERCP BILIARY ONLY  5/10/2020        Family History   Problem Relation Age of Onset    Pneumonia Mother     Hypertension Mother     Cancer Father     Chronic Obstructive Pulmonary Disease Sister  "    Multiple Sclerosis Sister     Acute Myocardial Infarction No family hx of         Past Medical History:   Diagnosis Date    Cholelithiasis     Coronary artery disease     Dermatitis     GERD (gastroesophageal reflux disease)     H/O cardiac arrest 2/2015    alka/torsades    Hyperlipidemia     Hypertension     Hypogonadism male     Male erectile disorder     Obesity     Sleep apnea     CPAP    Ventricular tachycardia (H)     seen on pacer interrogation        Social History     Tobacco Use    Smoking status: Former     Packs/day: 0     Types: Cigarettes     Quit date: 6/1/2008     Years since quitting: 15.5    Smokeless tobacco: Never   Substance Use Topics    Alcohol use: No    Drug use: No        Current Outpatient Medications   Medication Sig Dispense Refill    aspirin 81 MG EC tablet [ASPIRIN 81 MG EC TABLET] Take 81 mg by mouth daily.      calcium carbonate (CALCIUM CARBONATE) 300 mg (750 mg) Chew [CALCIUM CARBONATE (CALCIUM CARBONATE) 300 MG (750 MG) CHEW] Chew 2-3 tablets as needed (heartburn).             coenzyme Q10 (CO Q-10) 200 mg capsule [COENZYME Q10 (CO Q-10) 200 MG CAPSULE] Take 200 mg by mouth daily.       DIPHENHYDRAMINE HCL (BENADRYL ALLERGY ORAL) [DIPHENHYDRAMINE HCL (BENADRYL ALLERGY ORAL)] Take 25-50 mg by mouth every 6 (six) hours as needed (allergies).             fish oil-omega-3 fatty acids (FISH OIL) 300-1,000 mg capsule [FISH OIL-OMEGA-3 FATTY ACIDS (FISH OIL) 300-1,000 MG CAPSULE] Take 1 g by mouth daily.      latanoprost (XALATAN) 0.005 % ophthalmic solution [LATANOPROST (XALATAN) 0.005 % OPHTHALMIC SOLUTION] Administer 1 drop to both eyes at bedtime.             lisinopril (ZESTRIL) 5 MG tablet Take 1 tablet (5 mg) by mouth daily 90 tablet 3    metoprolol succinate ER (TOPROL XL) 100 MG 24 hr tablet Take 1 tablet (100 mg) by mouth At Bedtime 90 tablet 3    metoprolol succinate ER (TOPROL XL) 50 MG 24 hr tablet Take 1 tablet (50 mg) by mouth daily In addition to the 100 mg tablet. 90  "tablet 3    multivitamin therapeutic tablet [MULTIVITAMIN THERAPEUTIC TABLET] Take 1 tablet by mouth daily.      nitroglycerin (NITROSTAT) 0.4 MG SL tablet [NITROGLYCERIN (NITROSTAT) 0.4 MG SL TABLET] Place 1 tablet (0.4 mg total) under the tongue every 5 (five) minutes as needed for chest pain. 100 tablet 3    omeprazole (PRILOSEC) 20 MG DR capsule TAKE 1 CAPSULE AS NEEDED 90 capsule 1    oxymetazoline (AFRIN) 0.05 % nasal spray [OXYMETAZOLINE (AFRIN) 0.05 % NASAL SPRAY] Apply 2 sprays into each nostril daily as needed for congestion.      psyllium (METAMUCIL) 3.4 gram packet [PSYLLIUM (METAMUCIL) 3.4 GRAM PACKET] Take 1 packet by mouth 2 (two) times a day.      sildenafil (VIAGRA) 50 MG tablet Take 1 tablet (50 mg) by mouth as needed 30 tablet 11    simvastatin (ZOCOR) 40 MG tablet Take 1 tablet (40 mg) by mouth At Bedtime 90 tablet 3    tadalafil (CIALIS) 20 MG tablet Take 1 tablet (20 mg) by mouth daily as needed 30 tablet 11    testosterone (ANDROGEL/TESTIM) 50 MG/5GM (1%) topical gel Place 1 packet (50 mg of testosterone) onto the skin daily Apply to the clean, dry intact skin of the shoulders, upper arms or abdomen. 90 packet 1    traMADol (ULTRAM) 50 mg tablet [TRAMADOL (ULTRAM) 50 MG TABLET] Take 1-2 tablets ( mg total) by mouth every 6 (six) hours as needed for pain. Take 1-2 tablets every 6 hours as needed for pain 15 tablet 1    traZODone (DESYREL) 50 MG tablet [TRAZODONE (DESYREL) 50 MG TABLET] TAKE 1 TABLET AT BEDTIME (Patient taking differently: Take 50 mg by mouth as needed for sleep [TRAZODONE (DESYREL) 50 MG TABLET] TAKE 1 TABLET AT BEDTIME) 90 tablet 3          Objective:    Vitals:    12/07/23 1212 12/07/23 1213 12/07/23 1255   BP: (!) 150/80 (!) 146/80 136/78   Pulse: 80     Resp: 16     Temp: 97.7  F (36.5  C)     SpO2: 95%     Weight: 115.2 kg (254 lb)     Height: 1.778 m (5' 10\")        Body mass index is 36.45 kg/m .    Alert.  No apparent distress.  Chest clear.  Cardiac exam regular. "  ICD in place.  Extremities warm and dry.        Complete Echo 6/27/23 Adult. Definity ([facility] #01934-713) given intravenously.  ______________________________________________________________________________  Interpretation Summary     The left ventricle is normal in size.  There is normal left ventricular wall thickness.  The visual ejection fraction is 45-50%.  Grade II or moderate diastolic dysfunction.  Septal wall motion abnormality may reflect pacemaker activation.  There is mild global hypokinesia of the left ventricle.  Normal right ventricle size and systolic function.  There is a pacemaker lead in the right ventricle.  The left atrium is mildly dilated.  No obvious valvular disease.     No previous study for comparison.        This note has been dictated using voice recognition software and as a result may contain minor grammatical errors and unintended word substitutions.         Answers submitted by the patient for this visit:  Lipid Visit (Submitted on 11/30/2023)  Chief Complaint: Chronic problems general questions HPI Form  Are you regularly taking any medication or supplement to lower your cholesterol?: Yes  Are you having muscle aches or other side effects that you think could be caused by your cholesterol lowering medication?: Yes  Hypertension Visit (Submitted on 11/30/2023)  Chief Complaint: Chronic problems general questions HPI Form  Do you check your blood pressure regularly outside of the clinic?: No  Are your blood pressures ever more than 140 on the top number (systolic) OR more than 90 on the bottom number (diastolic)? (For example, greater than 140/90): No  Are you following a low salt diet?: No  Heart Failure Visit (Submitted on 11/30/2023)  Chief Complaint: Chronic problems general questions HPI Form  Dyspnea:: with activity only  Status:: same as usual  Edema:: same as usual  Are you using more pillows than usual at night?: No  Do you cough at night?: No  Checking weight daily::  No  Weight change recently:: weight increase  Heart Medication Side Effects:: cough  Frequency:: None  COPD (Chronic Obstructive Pulmonary Disease) Visit Questionnaire (Submitted on 11/30/2023)  Chief Complaint: Chronic problems general questions HPI Form  Current status of COPD symptom:: no change  Status of fatigue and dyspnea with ambulation:: same as usual  Status of dyspnea:: same as usual  Increase or change in cough or sputum:: sometimes  Have you noticed a change in your sputum/phlegm?: No  Have you experienced a recent fever?: No  Baseline ambulation without stopping to rest:: 2-5 blocks  Number of flights of stairs without resting:: 2  Have you had any Emergency Room, Urgent Care visits or a Hospital admission because of your COPD since your last office visit?: No  General Questionnaire (Submitted on 11/30/2023)  Chief Complaint: Chronic problems general questions HPI Form  What is the reason for your visit today? : General check up and occasional mild discomfort in right lumbar region.  How many servings of fruits and vegetables do you eat daily?: 0-1  On average, how many sweetened beverages do you drink each day (Examples: soda, juice, sweet tea, etc.  Do NOT count diet or artificially sweetened beverages)?: 0  How many minutes a day do you exercise enough to make your heart beat faster?: 9 or less  How many days a week do you exercise enough to make your heart beat faster?: 3 or less  How many days per week do you miss taking your medication?: 0

## 2024-02-28 ENCOUNTER — ANCILLARY PROCEDURE (OUTPATIENT)
Dept: CARDIOLOGY | Facility: CLINIC | Age: 79
End: 2024-02-28
Attending: INTERNAL MEDICINE
Payer: COMMERCIAL

## 2024-02-28 DIAGNOSIS — I50.9 CHF (CONGESTIVE HEART FAILURE) (H): ICD-10-CM

## 2024-02-28 DIAGNOSIS — Z95.810 BIVENTRICULAR ICD (IMPLANTABLE CARDIOVERTER-DEFIBRILLATOR) IN PLACE: ICD-10-CM

## 2024-02-28 DIAGNOSIS — I25.5 ISCHEMIC CARDIOMYOPATHY: ICD-10-CM

## 2024-02-28 LAB
MDC_IDC_EPISODE_DTM: NORMAL
MDC_IDC_EPISODE_DTM: NORMAL
MDC_IDC_EPISODE_DURATION: 13 S
MDC_IDC_EPISODE_ID: NORMAL
MDC_IDC_EPISODE_ID: NORMAL
MDC_IDC_EPISODE_TYPE: NORMAL
MDC_IDC_EPISODE_TYPE: NORMAL
MDC_IDC_EPISODE_TYPE_INDUCED: NO
MDC_IDC_LEAD_CONNECTION_STATUS: NORMAL
MDC_IDC_LEAD_IMPLANT_DT: NORMAL
MDC_IDC_LEAD_LOCATION: NORMAL
MDC_IDC_LEAD_LOCATION_DETAIL_1: NORMAL
MDC_IDC_LEAD_MFG: NORMAL
MDC_IDC_LEAD_MODEL: NORMAL
MDC_IDC_LEAD_POLARITY_TYPE: NORMAL
MDC_IDC_LEAD_SERIAL: NORMAL
MDC_IDC_LEAD_SPECIAL_FUNCTION: NORMAL
MDC_IDC_MSMT_BATTERY_DTM: NORMAL
MDC_IDC_MSMT_BATTERY_REMAINING_LONGEVITY: 24 MO
MDC_IDC_MSMT_BATTERY_REMAINING_PERCENTAGE: 38 %
MDC_IDC_MSMT_BATTERY_STATUS: NORMAL
MDC_IDC_MSMT_CAP_CHARGE_DTM: NORMAL
MDC_IDC_MSMT_CAP_CHARGE_DTM: NORMAL
MDC_IDC_MSMT_CAP_CHARGE_ENERGY: 41 J
MDC_IDC_MSMT_CAP_CHARGE_TIME: 13.4 S
MDC_IDC_MSMT_CAP_CHARGE_TIME: 8.8 S
MDC_IDC_MSMT_CAP_CHARGE_TYPE: NORMAL
MDC_IDC_MSMT_CAP_CHARGE_TYPE: NORMAL
MDC_IDC_MSMT_LEADCHNL_LV_IMPEDANCE_VALUE: 753 OHM
MDC_IDC_MSMT_LEADCHNL_LV_IMPEDANCE_VALUE: 753 OHM
MDC_IDC_MSMT_LEADCHNL_LV_PACING_THRESHOLD_AMPLITUDE: 0.8 V
MDC_IDC_MSMT_LEADCHNL_LV_PACING_THRESHOLD_PULSEWIDTH: 0.5 MS
MDC_IDC_MSMT_LEADCHNL_RA_IMPEDANCE_VALUE: 355 OHM
MDC_IDC_MSMT_LEADCHNL_RA_PACING_THRESHOLD_AMPLITUDE: 0.7 V
MDC_IDC_MSMT_LEADCHNL_RA_PACING_THRESHOLD_PULSEWIDTH: 0.5 MS
MDC_IDC_MSMT_LEADCHNL_RV_IMPEDANCE_VALUE: 375 OHM
MDC_IDC_MSMT_LEADCHNL_RV_PACING_THRESHOLD_AMPLITUDE: 1 V
MDC_IDC_MSMT_LEADCHNL_RV_PACING_THRESHOLD_PULSEWIDTH: 0.5 MS
MDC_IDC_PG_IMPLANT_DTM: NORMAL
MDC_IDC_PG_MFG: NORMAL
MDC_IDC_PG_MODEL: NORMAL
MDC_IDC_PG_SERIAL: NORMAL
MDC_IDC_PG_TYPE: NORMAL
MDC_IDC_SESS_CLINIC_NAME: NORMAL
MDC_IDC_SESS_DTM: NORMAL
MDC_IDC_SESS_TYPE: NORMAL
MDC_IDC_SET_BRADY_AT_MODE_SWITCH_MODE: NORMAL
MDC_IDC_SET_BRADY_AT_MODE_SWITCH_RATE: 170 {BEATS}/MIN
MDC_IDC_SET_BRADY_LOWRATE: 60 {BEATS}/MIN
MDC_IDC_SET_BRADY_MAX_SENSOR_RATE: 130 {BEATS}/MIN
MDC_IDC_SET_BRADY_MAX_TRACKING_RATE: 140 {BEATS}/MIN
MDC_IDC_SET_BRADY_MODE: NORMAL
MDC_IDC_SET_BRADY_PAV_DELAY_LOW: 180 MS
MDC_IDC_SET_BRADY_SAV_DELAY_LOW: 150 MS
MDC_IDC_SET_CRT_LVRV_DELAY: 0 MS
MDC_IDC_SET_CRT_PACED_CHAMBERS: NORMAL
MDC_IDC_SET_LEADCHNL_LV_PACING_AMPLITUDE: 2 V
MDC_IDC_SET_LEADCHNL_LV_PACING_ANODE_ELECTRODE_1: NORMAL
MDC_IDC_SET_LEADCHNL_LV_PACING_ANODE_LOCATION_1: NORMAL
MDC_IDC_SET_LEADCHNL_LV_PACING_CATHODE_ELECTRODE_1: NORMAL
MDC_IDC_SET_LEADCHNL_LV_PACING_CATHODE_LOCATION_1: NORMAL
MDC_IDC_SET_LEADCHNL_LV_PACING_PULSEWIDTH: 0.5 MS
MDC_IDC_SET_LEADCHNL_LV_SENSING_ADAPTATION_MODE: NORMAL
MDC_IDC_SET_LEADCHNL_LV_SENSING_ANODE_ELECTRODE_1: NORMAL
MDC_IDC_SET_LEADCHNL_LV_SENSING_ANODE_LOCATION_1: NORMAL
MDC_IDC_SET_LEADCHNL_LV_SENSING_CATHODE_ELECTRODE_1: NORMAL
MDC_IDC_SET_LEADCHNL_LV_SENSING_CATHODE_LOCATION_1: NORMAL
MDC_IDC_SET_LEADCHNL_LV_SENSING_SENSITIVITY: 1 MV
MDC_IDC_SET_LEADCHNL_RA_PACING_AMPLITUDE: 1.2 V
MDC_IDC_SET_LEADCHNL_RA_PACING_POLARITY: NORMAL
MDC_IDC_SET_LEADCHNL_RA_PACING_PULSEWIDTH: 0.5 MS
MDC_IDC_SET_LEADCHNL_RA_SENSING_ADAPTATION_MODE: NORMAL
MDC_IDC_SET_LEADCHNL_RA_SENSING_POLARITY: NORMAL
MDC_IDC_SET_LEADCHNL_RA_SENSING_SENSITIVITY: 0.25 MV
MDC_IDC_SET_LEADCHNL_RV_PACING_AMPLITUDE: 2 V
MDC_IDC_SET_LEADCHNL_RV_PACING_POLARITY: NORMAL
MDC_IDC_SET_LEADCHNL_RV_PACING_PULSEWIDTH: 0.5 MS
MDC_IDC_SET_LEADCHNL_RV_SENSING_ADAPTATION_MODE: NORMAL
MDC_IDC_SET_LEADCHNL_RV_SENSING_POLARITY: NORMAL
MDC_IDC_SET_LEADCHNL_RV_SENSING_SENSITIVITY: 0.6 MV
MDC_IDC_SET_ZONE_DETECTION_INTERVAL: 300 MS
MDC_IDC_SET_ZONE_DETECTION_INTERVAL: 353 MS
MDC_IDC_SET_ZONE_STATUS: NORMAL
MDC_IDC_SET_ZONE_STATUS: NORMAL
MDC_IDC_SET_ZONE_TYPE: NORMAL
MDC_IDC_SET_ZONE_TYPE: NORMAL
MDC_IDC_SET_ZONE_VENDOR_TYPE: NORMAL
MDC_IDC_SET_ZONE_VENDOR_TYPE: NORMAL
MDC_IDC_STAT_AT_BURDEN_PERCENT: 0 %
MDC_IDC_STAT_AT_DTM_END: NORMAL
MDC_IDC_STAT_AT_DTM_START: NORMAL
MDC_IDC_STAT_BRADY_DTM_END: NORMAL
MDC_IDC_STAT_BRADY_DTM_START: NORMAL
MDC_IDC_STAT_BRADY_RA_PERCENT_PACED: 14 %
MDC_IDC_STAT_BRADY_RV_PERCENT_PACED: 96 %
MDC_IDC_STAT_CRT_DTM_END: NORMAL
MDC_IDC_STAT_CRT_DTM_START: NORMAL
MDC_IDC_STAT_CRT_LV_PERCENT_PACED: 96 %
MDC_IDC_STAT_EPISODE_RECENT_COUNT: 0
MDC_IDC_STAT_EPISODE_RECENT_COUNT: 1
MDC_IDC_STAT_EPISODE_RECENT_COUNT_DTM_END: NORMAL
MDC_IDC_STAT_EPISODE_RECENT_COUNT_DTM_START: NORMAL
MDC_IDC_STAT_EPISODE_TYPE: NORMAL
MDC_IDC_STAT_EPISODE_VENDOR_TYPE: NORMAL
MDC_IDC_STAT_TACHYTHERAPY_ATP_DELIVERED_RECENT: 0
MDC_IDC_STAT_TACHYTHERAPY_ATP_DELIVERED_TOTAL: 1
MDC_IDC_STAT_TACHYTHERAPY_RECENT_DTM_END: NORMAL
MDC_IDC_STAT_TACHYTHERAPY_RECENT_DTM_START: NORMAL
MDC_IDC_STAT_TACHYTHERAPY_SHOCKS_ABORTED_RECENT: 0
MDC_IDC_STAT_TACHYTHERAPY_SHOCKS_ABORTED_TOTAL: 1
MDC_IDC_STAT_TACHYTHERAPY_SHOCKS_DELIVERED_RECENT: 0
MDC_IDC_STAT_TACHYTHERAPY_SHOCKS_DELIVERED_TOTAL: 2
MDC_IDC_STAT_TACHYTHERAPY_TOTAL_DTM_END: NORMAL
MDC_IDC_STAT_TACHYTHERAPY_TOTAL_DTM_START: NORMAL

## 2024-02-28 PROCEDURE — 93295 DEV INTERROG REMOTE 1/2/MLT: CPT | Performed by: INTERNAL MEDICINE

## 2024-02-28 PROCEDURE — 93296 REM INTERROG EVL PM/IDS: CPT | Performed by: INTERNAL MEDICINE

## 2024-03-14 DIAGNOSIS — E78.5 HYPERLIPIDEMIA LDL GOAL <70: ICD-10-CM

## 2024-03-14 DIAGNOSIS — I50.30 HEART FAILURE WITH PRESERVED EJECTION FRACTION, NYHA CLASS II (H): ICD-10-CM

## 2024-03-14 DIAGNOSIS — I44.2 THIRD DEGREE AV BLOCK (H): ICD-10-CM

## 2024-03-14 DIAGNOSIS — I10 ESSENTIAL HYPERTENSION: ICD-10-CM

## 2024-03-14 DIAGNOSIS — Z95.810 ICD (IMPLANTABLE CARDIOVERTER-DEFIBRILLATOR), BIVENTRICULAR, IN SITU: ICD-10-CM

## 2024-03-14 DIAGNOSIS — I42.9 CARDIOMYOPATHY (H): ICD-10-CM

## 2024-03-14 RX ORDER — METOPROLOL SUCCINATE 100 MG/1
100 TABLET, EXTENDED RELEASE ORAL AT BEDTIME
Qty: 90 TABLET | Refills: 0 | Status: SHIPPED | OUTPATIENT
Start: 2024-03-14 | End: 2024-07-05

## 2024-03-14 RX ORDER — LISINOPRIL 5 MG/1
5 TABLET ORAL DAILY
Qty: 90 TABLET | Refills: 0 | Status: SHIPPED | OUTPATIENT
Start: 2024-03-14 | End: 2024-07-12

## 2024-03-14 RX ORDER — METOPROLOL SUCCINATE 50 MG/1
TABLET, EXTENDED RELEASE ORAL
Qty: 90 TABLET | Refills: 0 | Status: SHIPPED | OUTPATIENT
Start: 2024-03-14 | End: 2024-06-28

## 2024-03-14 RX ORDER — SIMVASTATIN 40 MG
40 TABLET ORAL AT BEDTIME
Qty: 90 TABLET | Refills: 0 | Status: SHIPPED | OUTPATIENT
Start: 2024-03-14 | End: 2024-07-05

## 2024-06-02 SDOH — HEALTH STABILITY: PHYSICAL HEALTH: ON AVERAGE, HOW MANY DAYS PER WEEK DO YOU ENGAGE IN MODERATE TO STRENUOUS EXERCISE (LIKE A BRISK WALK)?: 1 DAY

## 2024-06-02 SDOH — HEALTH STABILITY: PHYSICAL HEALTH: ON AVERAGE, HOW MANY MINUTES DO YOU ENGAGE IN EXERCISE AT THIS LEVEL?: 20 MIN

## 2024-06-02 ASSESSMENT — SOCIAL DETERMINANTS OF HEALTH (SDOH): HOW OFTEN DO YOU GET TOGETHER WITH FRIENDS OR RELATIVES?: NEVER

## 2024-06-04 ENCOUNTER — ANCILLARY PROCEDURE (OUTPATIENT)
Dept: CARDIOLOGY | Facility: CLINIC | Age: 79
End: 2024-06-04
Attending: INTERNAL MEDICINE
Payer: COMMERCIAL

## 2024-06-04 DIAGNOSIS — I25.5 ISCHEMIC CARDIOMYOPATHY: ICD-10-CM

## 2024-06-04 DIAGNOSIS — Z95.810 BIVENTRICULAR ICD (IMPLANTABLE CARDIOVERTER-DEFIBRILLATOR) IN PLACE: ICD-10-CM

## 2024-06-04 DIAGNOSIS — I50.9 CHF (CONGESTIVE HEART FAILURE) (H): ICD-10-CM

## 2024-06-04 LAB
MDC_IDC_EPISODE_DTM: NORMAL
MDC_IDC_EPISODE_ID: NORMAL
MDC_IDC_EPISODE_TYPE: NORMAL
MDC_IDC_LEAD_CONNECTION_STATUS: NORMAL
MDC_IDC_LEAD_IMPLANT_DT: NORMAL
MDC_IDC_LEAD_LOCATION: NORMAL
MDC_IDC_LEAD_LOCATION_DETAIL_1: NORMAL
MDC_IDC_LEAD_MFG: NORMAL
MDC_IDC_LEAD_MODEL: NORMAL
MDC_IDC_LEAD_POLARITY_TYPE: NORMAL
MDC_IDC_LEAD_SERIAL: NORMAL
MDC_IDC_LEAD_SPECIAL_FUNCTION: NORMAL
MDC_IDC_MSMT_BATTERY_DTM: NORMAL
MDC_IDC_MSMT_BATTERY_REMAINING_LONGEVITY: 24 MO
MDC_IDC_MSMT_BATTERY_REMAINING_PERCENTAGE: 35 %
MDC_IDC_MSMT_BATTERY_STATUS: NORMAL
MDC_IDC_MSMT_CAP_CHARGE_DTM: NORMAL
MDC_IDC_MSMT_CAP_CHARGE_DTM: NORMAL
MDC_IDC_MSMT_CAP_CHARGE_ENERGY: 41 J
MDC_IDC_MSMT_CAP_CHARGE_TIME: 13.4 S
MDC_IDC_MSMT_CAP_CHARGE_TIME: 8.8 S
MDC_IDC_MSMT_CAP_CHARGE_TYPE: NORMAL
MDC_IDC_MSMT_CAP_CHARGE_TYPE: NORMAL
MDC_IDC_MSMT_LEADCHNL_LV_IMPEDANCE_VALUE: 779 OHM
MDC_IDC_MSMT_LEADCHNL_LV_PACING_THRESHOLD_AMPLITUDE: 0.8 V
MDC_IDC_MSMT_LEADCHNL_LV_PACING_THRESHOLD_PULSEWIDTH: 0.5 MS
MDC_IDC_MSMT_LEADCHNL_RA_IMPEDANCE_VALUE: 375 OHM
MDC_IDC_MSMT_LEADCHNL_RV_IMPEDANCE_VALUE: 393 OHM
MDC_IDC_PG_IMPLANT_DTM: NORMAL
MDC_IDC_PG_MFG: NORMAL
MDC_IDC_PG_MODEL: NORMAL
MDC_IDC_PG_SERIAL: NORMAL
MDC_IDC_PG_TYPE: NORMAL
MDC_IDC_SESS_CLINIC_NAME: NORMAL
MDC_IDC_SESS_DTM: NORMAL
MDC_IDC_SESS_TYPE: NORMAL
MDC_IDC_SET_BRADY_AT_MODE_SWITCH_MODE: NORMAL
MDC_IDC_SET_BRADY_AT_MODE_SWITCH_RATE: 170 {BEATS}/MIN
MDC_IDC_SET_BRADY_LOWRATE: 60 {BEATS}/MIN
MDC_IDC_SET_BRADY_MAX_SENSOR_RATE: 130 {BEATS}/MIN
MDC_IDC_SET_BRADY_MAX_TRACKING_RATE: 140 {BEATS}/MIN
MDC_IDC_SET_BRADY_MODE: NORMAL
MDC_IDC_SET_BRADY_PAV_DELAY_LOW: 180 MS
MDC_IDC_SET_BRADY_SAV_DELAY_LOW: 150 MS
MDC_IDC_SET_CRT_LVRV_DELAY: 0 MS
MDC_IDC_SET_CRT_PACED_CHAMBERS: NORMAL
MDC_IDC_SET_LEADCHNL_LV_PACING_AMPLITUDE: 2 V
MDC_IDC_SET_LEADCHNL_LV_PACING_ANODE_ELECTRODE_1: NORMAL
MDC_IDC_SET_LEADCHNL_LV_PACING_ANODE_LOCATION_1: NORMAL
MDC_IDC_SET_LEADCHNL_LV_PACING_CATHODE_ELECTRODE_1: NORMAL
MDC_IDC_SET_LEADCHNL_LV_PACING_CATHODE_LOCATION_1: NORMAL
MDC_IDC_SET_LEADCHNL_LV_PACING_PULSEWIDTH: 0.5 MS
MDC_IDC_SET_LEADCHNL_LV_SENSING_ADAPTATION_MODE: NORMAL
MDC_IDC_SET_LEADCHNL_LV_SENSING_ANODE_ELECTRODE_1: NORMAL
MDC_IDC_SET_LEADCHNL_LV_SENSING_ANODE_LOCATION_1: NORMAL
MDC_IDC_SET_LEADCHNL_LV_SENSING_CATHODE_ELECTRODE_1: NORMAL
MDC_IDC_SET_LEADCHNL_LV_SENSING_CATHODE_LOCATION_1: NORMAL
MDC_IDC_SET_LEADCHNL_LV_SENSING_SENSITIVITY: 1 MV
MDC_IDC_SET_LEADCHNL_RA_PACING_AMPLITUDE: 1.2 V
MDC_IDC_SET_LEADCHNL_RA_PACING_POLARITY: NORMAL
MDC_IDC_SET_LEADCHNL_RA_PACING_PULSEWIDTH: 0.5 MS
MDC_IDC_SET_LEADCHNL_RA_SENSING_ADAPTATION_MODE: NORMAL
MDC_IDC_SET_LEADCHNL_RA_SENSING_POLARITY: NORMAL
MDC_IDC_SET_LEADCHNL_RA_SENSING_SENSITIVITY: 0.25 MV
MDC_IDC_SET_LEADCHNL_RV_PACING_AMPLITUDE: 2 V
MDC_IDC_SET_LEADCHNL_RV_PACING_POLARITY: NORMAL
MDC_IDC_SET_LEADCHNL_RV_PACING_PULSEWIDTH: 0.5 MS
MDC_IDC_SET_LEADCHNL_RV_SENSING_ADAPTATION_MODE: NORMAL
MDC_IDC_SET_LEADCHNL_RV_SENSING_POLARITY: NORMAL
MDC_IDC_SET_LEADCHNL_RV_SENSING_SENSITIVITY: 0.6 MV
MDC_IDC_SET_ZONE_DETECTION_INTERVAL: 300 MS
MDC_IDC_SET_ZONE_DETECTION_INTERVAL: 353 MS
MDC_IDC_SET_ZONE_STATUS: NORMAL
MDC_IDC_SET_ZONE_STATUS: NORMAL
MDC_IDC_SET_ZONE_TYPE: NORMAL
MDC_IDC_SET_ZONE_TYPE: NORMAL
MDC_IDC_SET_ZONE_VENDOR_TYPE: NORMAL
MDC_IDC_SET_ZONE_VENDOR_TYPE: NORMAL
MDC_IDC_STAT_AT_BURDEN_PERCENT: 0 %
MDC_IDC_STAT_AT_DTM_END: NORMAL
MDC_IDC_STAT_AT_DTM_START: NORMAL
MDC_IDC_STAT_BRADY_DTM_END: NORMAL
MDC_IDC_STAT_BRADY_DTM_START: NORMAL
MDC_IDC_STAT_BRADY_RA_PERCENT_PACED: 14 %
MDC_IDC_STAT_BRADY_RV_PERCENT_PACED: 95 %
MDC_IDC_STAT_CRT_DTM_END: NORMAL
MDC_IDC_STAT_CRT_DTM_START: NORMAL
MDC_IDC_STAT_CRT_LV_PERCENT_PACED: 95 %
MDC_IDC_STAT_EPISODE_RECENT_COUNT: 0
MDC_IDC_STAT_EPISODE_RECENT_COUNT_DTM_END: NORMAL
MDC_IDC_STAT_EPISODE_RECENT_COUNT_DTM_START: NORMAL
MDC_IDC_STAT_EPISODE_TYPE: NORMAL
MDC_IDC_STAT_EPISODE_VENDOR_TYPE: NORMAL
MDC_IDC_STAT_EPISODE_VENDOR_TYPE: NORMAL
MDC_IDC_STAT_TACHYTHERAPY_ATP_DELIVERED_RECENT: 0
MDC_IDC_STAT_TACHYTHERAPY_ATP_DELIVERED_TOTAL: 1
MDC_IDC_STAT_TACHYTHERAPY_RECENT_DTM_END: NORMAL
MDC_IDC_STAT_TACHYTHERAPY_RECENT_DTM_START: NORMAL
MDC_IDC_STAT_TACHYTHERAPY_SHOCKS_ABORTED_RECENT: 0
MDC_IDC_STAT_TACHYTHERAPY_SHOCKS_ABORTED_TOTAL: 1
MDC_IDC_STAT_TACHYTHERAPY_SHOCKS_DELIVERED_RECENT: 0
MDC_IDC_STAT_TACHYTHERAPY_SHOCKS_DELIVERED_TOTAL: 2
MDC_IDC_STAT_TACHYTHERAPY_TOTAL_DTM_END: NORMAL
MDC_IDC_STAT_TACHYTHERAPY_TOTAL_DTM_START: NORMAL

## 2024-06-04 PROCEDURE — 93296 REM INTERROG EVL PM/IDS: CPT | Performed by: INTERNAL MEDICINE

## 2024-06-04 PROCEDURE — 93295 DEV INTERROG REMOTE 1/2/MLT: CPT | Performed by: INTERNAL MEDICINE

## 2024-06-07 ENCOUNTER — OFFICE VISIT (OUTPATIENT)
Dept: FAMILY MEDICINE | Facility: CLINIC | Age: 79
End: 2024-06-07
Payer: COMMERCIAL

## 2024-06-07 VITALS
BODY MASS INDEX: 34.36 KG/M2 | DIASTOLIC BLOOD PRESSURE: 74 MMHG | WEIGHT: 240 LBS | SYSTOLIC BLOOD PRESSURE: 134 MMHG | HEIGHT: 70 IN | TEMPERATURE: 97 F | RESPIRATION RATE: 15 BRPM | OXYGEN SATURATION: 98 % | HEART RATE: 67 BPM

## 2024-06-07 DIAGNOSIS — E29.1 HYPOGONADISM MALE: ICD-10-CM

## 2024-06-07 DIAGNOSIS — Z23 ENCOUNTER FOR IMMUNIZATION: ICD-10-CM

## 2024-06-07 DIAGNOSIS — K21.9 GASTROESOPHAGEAL REFLUX DISEASE WITHOUT ESOPHAGITIS: ICD-10-CM

## 2024-06-07 DIAGNOSIS — E78.00 PURE HYPERCHOLESTEROLEMIA: ICD-10-CM

## 2024-06-07 DIAGNOSIS — Z95.810 ICD (IMPLANTABLE CARDIOVERTER-DEFIBRILLATOR), BIVENTRICULAR, IN SITU: ICD-10-CM

## 2024-06-07 DIAGNOSIS — G47.33 OSA ON CPAP: ICD-10-CM

## 2024-06-07 DIAGNOSIS — D64.9 NORMOCHROMIC NORMOCYTIC ANEMIA: ICD-10-CM

## 2024-06-07 DIAGNOSIS — I42.9 CARDIOMYOPATHY, UNSPECIFIED TYPE (H): ICD-10-CM

## 2024-06-07 DIAGNOSIS — D12.6 BENIGN NEOPLASM OF COLON, UNSPECIFIED PART OF COLON: ICD-10-CM

## 2024-06-07 DIAGNOSIS — E66.811 CLASS 1 OBESITY WITH SERIOUS COMORBIDITY AND BODY MASS INDEX (BMI) OF 34.0 TO 34.9 IN ADULT, UNSPECIFIED OBESITY TYPE: ICD-10-CM

## 2024-06-07 DIAGNOSIS — I10 ESSENTIAL HYPERTENSION: ICD-10-CM

## 2024-06-07 DIAGNOSIS — I50.30 HEART FAILURE WITH PRESERVED EJECTION FRACTION, NYHA CLASS II (H): ICD-10-CM

## 2024-06-07 DIAGNOSIS — Z00.00 MEDICARE ANNUAL WELLNESS VISIT, SUBSEQUENT: Primary | ICD-10-CM

## 2024-06-07 DIAGNOSIS — D12.6 TUBULAR ADENOMA OF COLON: ICD-10-CM

## 2024-06-07 DIAGNOSIS — I47.29 NSVT (NONSUSTAINED VENTRICULAR TACHYCARDIA) (H): ICD-10-CM

## 2024-06-07 DIAGNOSIS — G47.33 OBSTRUCTIVE SLEEP APNEA (ADULT) (PEDIATRIC): ICD-10-CM

## 2024-06-07 DIAGNOSIS — N52.9 ERECTILE DYSFUNCTION, UNSPECIFIED ERECTILE DYSFUNCTION TYPE: ICD-10-CM

## 2024-06-07 PROBLEM — E66.01 CLASS 2 SEVERE OBESITY DUE TO EXCESS CALORIES WITH SERIOUS COMORBIDITY IN ADULT (H): Status: RESOLVED | Noted: 2023-12-07 | Resolved: 2024-06-07

## 2024-06-07 PROBLEM — E66.812 CLASS 2 SEVERE OBESITY DUE TO EXCESS CALORIES WITH SERIOUS COMORBIDITY IN ADULT (H): Status: RESOLVED | Noted: 2023-12-07 | Resolved: 2024-06-07

## 2024-06-07 LAB
ERYTHROCYTE [DISTWIDTH] IN BLOOD BY AUTOMATED COUNT: 12.3 % (ref 10–15)
HCT VFR BLD AUTO: 47.4 % (ref 40–53)
HGB BLD-MCNC: 15.8 G/DL (ref 13.3–17.7)
MCH RBC QN AUTO: 31 PG (ref 26.5–33)
MCHC RBC AUTO-ENTMCNC: 33.3 G/DL (ref 31.5–36.5)
MCV RBC AUTO: 93 FL (ref 78–100)
PLATELET # BLD AUTO: 154 10E3/UL (ref 150–450)
RBC # BLD AUTO: 5.1 10E6/UL (ref 4.4–5.9)
WBC # BLD AUTO: 7.7 10E3/UL (ref 4–11)

## 2024-06-07 PROCEDURE — 85027 COMPLETE CBC AUTOMATED: CPT | Performed by: FAMILY MEDICINE

## 2024-06-07 PROCEDURE — G0439 PPPS, SUBSEQ VISIT: HCPCS | Performed by: FAMILY MEDICINE

## 2024-06-07 PROCEDURE — 84270 ASSAY OF SEX HORMONE GLOBUL: CPT | Performed by: FAMILY MEDICINE

## 2024-06-07 PROCEDURE — 80061 LIPID PANEL: CPT | Performed by: FAMILY MEDICINE

## 2024-06-07 PROCEDURE — 91320 SARSCV2 VAC 30MCG TRS-SUC IM: CPT | Performed by: FAMILY MEDICINE

## 2024-06-07 PROCEDURE — 90480 ADMN SARSCOV2 VAC 1/ONLY CMP: CPT | Performed by: FAMILY MEDICINE

## 2024-06-07 PROCEDURE — 80053 COMPREHEN METABOLIC PANEL: CPT | Performed by: FAMILY MEDICINE

## 2024-06-07 PROCEDURE — 36415 COLL VENOUS BLD VENIPUNCTURE: CPT | Performed by: FAMILY MEDICINE

## 2024-06-07 PROCEDURE — 99214 OFFICE O/P EST MOD 30 MIN: CPT | Mod: 25 | Performed by: FAMILY MEDICINE

## 2024-06-07 PROCEDURE — 84403 ASSAY OF TOTAL TESTOSTERONE: CPT | Performed by: FAMILY MEDICINE

## 2024-06-07 RX ORDER — RESPIRATORY SYNCYTIAL VIRUS VACCINE 120MCG/0.5
0.5 KIT INTRAMUSCULAR ONCE
Qty: 1 EACH | Refills: 0 | Status: CANCELLED | OUTPATIENT
Start: 2024-06-07 | End: 2024-06-07

## 2024-06-07 ASSESSMENT — PAIN SCALES - GENERAL: PAINLEVEL: NO PAIN (0)

## 2024-06-07 NOTE — PROGRESS NOTES
Preventive Care Visit  [facility]  Adams Garcia MD, Family Medicine  Jun 7, 2024      Assessment & Plan     Medicare annual wellness visit, subsequent  Annual Wellness Visit completed.  Risk questionaire reviewed in detail.  Appropriate preventive services were discussed with this patient, including applicable screening as appropriate for fall prevention, nutrition, physical activity, tobacco-use cessation, weight loss, and cognition.  Annual Wellness Visits recommended to continue.     Class 1 obesity with serious comorbidity and body mass index (BMI) of 34.0 to 34.9 in adult, unspecified obesity type  Dietary and exercise modification for weight goal less than 230 pounds initially, less than 220 pounds ideally.    Essential hypertension  Benefits of lisinopril 5 mg daily metoprolol succinate 150 mg daily continuing.  - Comprehensive metabolic panel    Hypercholesterolemia  Utilizing simvastatin 40 mg at bedtime.  Update lipid cascade today while fasting.  - Lipid panel reflex to direct LDL Fasting    ICD (implantable cardioverter-defibrillator), biventricular, in situ  ICD in place for nonsustained ventricular tachycardia historically.    Heart failure with preserved ejection fraction, NYHA class II (H)  Heart failure with preserved ejection fraction reviewed.  Will see Dr. Frost in follow-up as scheduled on July 12, 2024.  Asymptomatic currently.  Felt correlated to ROMELIA which she continue CPAP management 4.  - Comprehensive metabolic panel    Cardiomyopathy, unspecified type (H)  As above.    Gastroesophageal reflux disease without esophagitis  Omeprazole 20 mg daily.  Antacids OTC as needed.  - Comprehensive metabolic panel    Hypogonadism male  Interested in testosterone replacement if indicated.  Has had hypergonadism historically and will update free and total testosterone levels.  - Testosterone Free and Total    Erectile dysfunction, unspecified erectile dysfunction type  Utilizes  "sildenafil which has less side effects than tadalafil.    Obstructive Sleep Apnea  ROMELIA with CPAP utilized.    Normochromic normocytic anemia  Update CBC to ensure stable or resolved findings.  - CBC with platelets    Benign neoplasm of colon, unspecified part of colon  Prior tubular adenoma of colon and will update colonoscopy with prior colonoscopy September 30, 2015 with 5-year follow-up recommendation at that time.    ROMELIA on CPAP  CPAP for ROMELIA management.    NSVT (nonsustained ventricular tachycardia) (H)  History of nonsustained ventricular tachycardia and does have ICD in place.    Encounter for immunization  Updated COVID booster to be provided.  - COVID-19 12+ (2023-24) (PFIZER)    Tubular adenoma of colon  Updated colonoscopy with history of tubular adenoma of colon.  Prior colonoscopy September 30, 2015 with 5-year follow-up recommendation noted.  - Colonoscopy Screening  Referral    episode ventricular tachycardia March 4, 2022 at 8:15 pm; seem to to be triggered by by the trigger went into ventricular tachycardia was pace terminated-ATP x1.  Proarrhythmia from pacing settings with BiV pacing-trigger therefore turned off BiV trigger at that time.         Patient has been advised of split billing requirements and indicates understanding: Yes        BMI  Estimated body mass index is 34.44 kg/m  as calculated from the following:    Height as of this encounter: 1.778 m (5' 10\").    Weight as of this encounter: 108.9 kg (240 lb).   Weight management plan: Discussed healthy diet and exercise guidelines    Counseling  Appropriate preventive services were discussed with this patient, including applicable screening as appropriate for fall prevention, nutrition, physical activity, Tobacco-use cessation, weight loss and cognition.  Checklist reviewing preventive services available has been given to the patient.  Reviewed patient's diet, addressing concerns and/or questions.   He is at risk for lack of " "exercise and has been provided with information to increase physical activity for the benefit of his well-being.   Patient is at risk for social isolation and has been provided with information about the benefit of social connection.   The patient was provided with written information regarding signs of hearing loss.           Richa Rucker is a 78 year old, presenting for the following:  Medicare Visit (Pt is fasting)        2024     1:14 PM   Additional Questions   Roomed by          Health Care Directive  Patient does not have a Health Care Directive or Living Will: Discussed advance care planning with patient; information given to patient to review.    HPI    Annual wellness visit completed.  In general doing well.  Some left lower back pain.  Worse after going from a seated to standing position.  Can radiate into left leg at times.  History of nonsustained ventricular tachycardia and has ICD in place.  Cardiomyopathy with heart failure with preserved ejection fraction described.  Follows with Dr. Blanchard and was seen May 2023 and will follow-up as scheduled on 2024.  Sildenafil has been tolerated better than tadalafil for erectile dysfunction.  Has history of hypergonadism and would like testosterone replacement however needs 2 sets of testosterone levels apparently.  CPAP for ROMELIA management.  History of mild normochromic normocytic anemia.  Prior tubular adenoma of colon reviewed.  Was told to follow-up at 5-year interval and is overdue with prior colonoscopy 2015.  Comprehensive review of systems as above otherwise all negative.       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 left s/p T & A as child; dual chamber pacemaker placement on 2/9/15 after cardiac arrest.   Quit EtOH 2/27/02   ROMELIA on CPAP at 8 cm H2O             6/2/2024   General Health   How would you rate your overall physical health? Good   Feel stress (tense, anxious, or unable to sleep) Not at all         6/2/2024   Nutrition   Diet: Regular (no restrictions)         6/2/2024   Exercise   Days per week of moderate/strenous exercise 1 day   Average minutes spent exercising at this level 20 min   (!) EXERCISE CONCERN      6/2/2024   Social Factors   Frequency of gathering with friends or relatives Never   Worry food won't last until get money to buy more No   Food not last or not have enough money for food? No   Do you have housing?  Yes   Are you worried about losing your housing? No   Lack of transportation? No   Unable to get utilities (heat,electricity)? No   (!) SOCIAL CONNECTIONS CONCERN      6/7/2024   Fall Risk   Fallen 2 or more times in the past year? No   Trouble with walking or balance? No          6/2/2024   Activities of Daily Living- Home Safety   Needs help with the following daily activites None of the above   Safety concerns in the home None of the above         6/2/2024   Dental   Dentist two times every year? Yes         6/2/2024   Hearing Screening   Hearing concerns? (!) IT'S HARDER TO UNDERSTAND WOMEN'S VOICES THAN MEN'S VOICES.    (!) IT'S HARD TO FOLLOW A CONVERSATION IN A NOISY RESTAURANT OR CROWDED ROOM.         6/2/2024   Driving Risk Screening   Patient/family members have concerns about driving No         6/2/2024   General Alertness/Fatigue Screening   Have you been more tired than usual lately? No         6/2/2024   Urinary Incontinence Screening   Bothered by leaking urine in past 6 months No         6/2/2024   TB Screening   Were you born outside  of the [place]? No         Today's PHQ-2 Score:       2024     2:31 PM   PHQ-2 (  [employer])   Q1: Little interest or pleasure in doing things 0   Q2: Feeling down, depressed or hopeless 0   PHQ-2 Score 0   Q1: Little interest or pleasure in doing things Not at all   Q2: Feeling down, depressed or hopeless Not at all   PHQ-2 Score 0           2024   Substance Use   Alcohol more than 3/day or more than 7/wk Not Applicable   Do you have a current opioid prescription? No   How severe/bad is pain from 1 to 10? 2/10   Do you use any other substances recreationally? No     Social History     Tobacco Use    Smoking status: Former     Current packs/day: 0.00     Types: Cigarettes     Quit date: 2008     Years since quittin.0    Smokeless tobacco: Never   Substance Use Topics    Alcohol use: No    Drug use: No       ASCVD Risk   The 10-year ASCVD risk score (Carrie CAMACHO, et al., 2019) is: 33.5%    Values used to calculate the score:      Age: 78 years      Sex: Male      Is Non- : No      Diabetic: No      Tobacco smoker: No      Systolic Blood Pressure: 134 mmHg      Is BP treated: Yes      HDL Cholesterol: 45 mg/dL      Total Cholesterol: 168 mg/dL    Fracture Risk Assessment Tool  Link to Frax Calculator  Use the information below to complete the Frax calculator  : 1945  Sex: male  Weight (kg): 108.9 kg (actual weight)  Height (cm): 177.8 cm  Previous Fragility Fracture:  No  History of parent with fractured hip:  No  Current Smoking:  No  Patient has been on glucocorticoids for more than 3 months (5mg/day or more): No  Rheumatoid Arthritis on Problem List:  No  Secondary Osteoporosis on Problem List:  No  Consumes 3 or more units of alcohol per day: No  Femoral Neck BMD (g/cm2)            Reviewed and updated as needed this visit by Provider                    Past Medical History:   Diagnosis Date    Cholelithiasis     Coronary artery disease     Dermatitis     GERD  (gastroesophageal reflux disease)     H/O cardiac arrest 2/2015    alka/torsades    Hyperlipidemia     Hypertension     Hypogonadism male     Male erectile disorder     Obesity     Sleep apnea     CPAP    Ventricular tachycardia (H)     seen on pacer interrogation     Past Surgical History:   Procedure Laterality Date    CARDIAC CATHETERIZATION      COLONOSCOPY N/A 9/30/2015    Procedure: COLONOSCOPY;  Surgeon: Rafiq Gu MD;  Location: [facility];  Service:     HC REMOVE TONSILS/ADENOIDS,<13 Y/O      Description: Tonsillectomy With Adenoidectomy;  Recorded: 02/22/2008;    HERNIA REPAIR      inguinal     INSERT / REPLACE / REMOVE PACEMAKER      LAPAROSCOPIC CHOLECYSTECTOMY N/A 10/2/2019    Procedure: CHOLECYSTECTOMY, LAPAROSCOPIC;  Surgeon: Shin Baird MD;  Location: [facility] OR;  Service: General    OTHER SURGICAL HISTORY  2010    polypremoved during colonoscopy    PA ERCP DX COLLECTION SPECIMEN BRUSHING/WASHING N/A 5/10/2020    Procedure: ENDOSCOPIC RETROGRADE CHOLANGIOPANCREATOGRAPHY;  Surgeon: Simon Loaiza MD;  Location: [facility] [place];  Service: Gastroenterology    REMOVAL OF SPERM DUCT(S)      Description: Surgery Of Male Genitalia Vasectomy;  Recorded: 02/22/2008;    TONSILLECTOMY      TUMOR REMOVAL  2008    right vocal cord -     XR ERCP BILIARY ONLY  5/10/2020     Lab work is in process  Labs reviewed in EPIC  BP Readings from Last 3 Encounters:   06/07/24 134/74   12/07/23 136/78   06/07/23 132/70    Wt Readings from Last 3 Encounters:   06/07/24 108.9 kg (240 lb)   12/07/23 115.2 kg (254 lb)   06/07/23 105.2 kg (232 lb)                  Patient Active Problem List   Diagnosis    Skin Neoplasm Of Uncertain Behavior    Hypercholesterolemia    Obstructive Sleep Apnea    Esophageal Reflux    Impaired Fasting Glucose    Changed Sexual Interest (Libido): Decreased    Cholelithiasis    Laryngeal Neoplasm Of The Vocal Cord    HTN (hypertension)    Dermatitis    Hoarseness    Male  Erectile Disorder    Hypogonadism    Insomnia    Benign Tubular Adenoma Of The Large Intestine    Class 1 obesity due to excess calories with serious comorbidity and body mass index (BMI) of 31.0 to 31.9 in adult    Skin Tag (___ Mm)    Tachycardia    Bradycardia    Heart block AV complete (H)    Right ventricular dysfunction    CAD (coronary artery disease)    Normochromic normocytic anemia    NSVT (nonsustained ventricular tachycardia) (H)    Ischemic cardiomyopathy    Complete AV block, acquired (H)    Adenomatous colon polyp    Tubular adenoma of colon    ROMELIA on CPAP    ICD (implantable cardioverter-defibrillator), biventricular, in situ    Bilateral hearing loss    Erectile dysfunction, unspecified erectile dysfunction type    Cholelithiasis    Paroxysmal atrial fibrillation (H)    Epigastric pain    Symptomatic cholelithiasis    Choledocholithiasis    Hypoxia    Heart failure with preserved ejection fraction, NYHA class II (H)     Past Surgical History:   Procedure Laterality Date    CARDIAC CATHETERIZATION      COLONOSCOPY N/A 9/30/2015    Procedure: COLONOSCOPY;  Surgeon: Rafiq Gu MD;  Location: [facility];  Service:     HC REMOVE TONSILS/ADENOIDS,<11 Y/O      Description: Tonsillectomy With Adenoidectomy;  Recorded: 02/22/2008;    HERNIA REPAIR      inguinal     INSERT / REPLACE / REMOVE PACEMAKER      LAPAROSCOPIC CHOLECYSTECTOMY N/A 10/2/2019    Procedure: CHOLECYSTECTOMY, LAPAROSCOPIC;  Surgeon: Shin Baird MD;  Location: [facility];  Service: General    OTHER SURGICAL HISTORY  2010    polypremoved during colonoscopy    WI ERCP DX COLLECTION SPECIMEN BRUSHING/WASHING N/A 5/10/2020    Procedure: ENDOSCOPIC RETROGRADE CHOLANGIOPANCREATOGRAPHY;  Surgeon: Simon Loaiza MD;  Location: [facility];  Service: Gastroenterology    REMOVAL OF SPERM DUCT(S)      Description: Surgery Of Male Genitalia Vasectomy;  Recorded: 02/22/2008;    TONSILLECTOMY      TUMOR REMOVAL  2008     right vocal cord -     XR ERCP BILIARY ONLY  5/10/2020       Social History     Tobacco Use    Smoking status: Former     Current packs/day: 0.00     Types: Cigarettes     Quit date: 2008     Years since quittin.0    Smokeless tobacco: Never   Substance Use Topics    Alcohol use: No     Family History   Problem Relation Age of Onset    Pneumonia Mother     Hypertension Mother     Cancer Father     Chronic Obstructive Pulmonary Disease Sister     Multiple Sclerosis Sister     Acute Myocardial Infarction No family hx of          Current Outpatient Medications   Medication Sig Dispense Refill    aspirin 81 MG EC tablet [ASPIRIN 81 MG EC TABLET] Take 81 mg by mouth daily.      calcium carbonate (CALCIUM CARBONATE) 300 mg (750 mg) Chew [CALCIUM CARBONATE (CALCIUM CARBONATE) 300 MG (750 MG) CHEW] Chew 2-3 tablets as needed (heartburn).             coenzyme Q10 (CO Q-10) 200 mg capsule [COENZYME Q10 (CO Q-10) 200 MG CAPSULE] Take 200 mg by mouth daily.       DIPHENHYDRAMINE HCL (BENADRYL ALLERGY ORAL) [DIPHENHYDRAMINE HCL (BENADRYL ALLERGY ORAL)] Take 25-50 mg by mouth every 6 (six) hours as needed (allergies).             fish oil-omega-3 fatty acids (FISH OIL) 300-1,000 mg capsule [FISH OIL-OMEGA-3 FATTY ACIDS (FISH OIL) 300-1,000 MG CAPSULE] Take 1 g by mouth daily.      latanoprost (XALATAN) 0.005 % ophthalmic solution [LATANOPROST (XALATAN) 0.005 % OPHTHALMIC SOLUTION] Administer 1 drop to both eyes at bedtime.             lisinopril (ZESTRIL) 5 MG tablet TAKE ONE TABLET BY MOUTH ONCE DAILY 90 tablet 0    metoprolol succinate ER (TOPROL XL) 100 MG 24 hr tablet TAKE ONE TABLET BY MOUTH ONCE DAILY AT BEDTIME 90 tablet 0    metoprolol succinate ER (TOPROL XL) 50 MG 24 hr tablet TAKE ONE TABLET BY MOUTH ONCE DAILY  IN ADDITION TO  MG TABLET 90 tablet 0    multivitamin therapeutic tablet [MULTIVITAMIN THERAPEUTIC TABLET] Take 1 tablet by mouth daily.      nitroglycerin (NITROSTAT) 0.4 MG SL tablet  [NITROGLYCERIN (NITROSTAT) 0.4 MG SL TABLET] Place 1 tablet (0.4 mg total) under the tongue every 5 (five) minutes as needed for chest pain. 100 tablet 3    omeprazole (PRILOSEC) 20 MG DR capsule TAKE 1 CAPSULE AS NEEDED 90 capsule 1    oxymetazoline (AFRIN) 0.05 % nasal spray [OXYMETAZOLINE (AFRIN) 0.05 % NASAL SPRAY] Apply 2 sprays into each nostril daily as needed for congestion.      psyllium (METAMUCIL) 3.4 gram packet [PSYLLIUM (METAMUCIL) 3.4 GRAM PACKET] Take 1 packet by mouth 2 (two) times a day.      sildenafil (VIAGRA) 50 MG tablet Take 1 tablet (50 mg) by mouth as needed 30 tablet 11    simvastatin (ZOCOR) 40 MG tablet TAKE ONE TABLET BY MOUTH ONCE DAILY AT BEDTIME 90 tablet 0    tadalafil (CIALIS) 20 MG tablet Take 1 tablet (20 mg) by mouth daily as needed 30 tablet 11    testosterone (ANDROGEL/TESTIM) 50 MG/5GM (1%) topical gel Place 1 packet (50 mg of testosterone) onto the skin daily Apply to the clean, dry intact skin of the shoulders, upper arms or abdomen. 90 packet 1    traMADol (ULTRAM) 50 mg tablet [TRAMADOL (ULTRAM) 50 MG TABLET] Take 1-2 tablets ( mg total) by mouth every 6 (six) hours as needed for pain. Take 1-2 tablets every 6 hours as needed for pain 15 tablet 1    traZODone (DESYREL) 50 MG tablet [TRAZODONE (DESYREL) 50 MG TABLET] TAKE 1 TABLET AT BEDTIME (Patient taking differently: Take 50 mg by mouth as needed for sleep [TRAZODONE (DESYREL) 50 MG TABLET] TAKE 1 TABLET AT BEDTIME) 90 tablet 3     Allergies   Allergen Reactions    Zithromax [Azithromycin] Rash     Current providers sharing in care for this patient include:  Patient Care Team:  Adams Garcia MD as PCP - General  Adams Garcia MD as Assigned PCP  Rafal Frost MD as Assigned Heart and Vascular Provider    The following health maintenance items are reviewed in Epic and correct as of today:  Health Maintenance   Topic Date Due    HF ACTION PLAN  Never done    RSV VACCINE (Pregnancy & 60+) (1 - 1-dose 60+  "series) Never done    ZOSTER IMMUNIZATION (2 of 3) 10/28/2008    COVID-19 Vaccine (6 - 2023-24 season) 02/02/2024    MEDICARE ANNUAL WELLNESS VISIT  06/07/2024    BMP  06/07/2024    ALT  12/07/2024    LIPID  12/07/2024    CBC  12/07/2024    ANNUAL REVIEW OF HM ORDERS  06/07/2025    FALL RISK ASSESSMENT  06/07/2025    GLUCOSE  12/07/2026    ADVANCE CARE PLANNING  06/07/2029    DTAP/TDAP/TD IMMUNIZATION (3 - Td or Tdap) 06/12/2029    TSH W/FREE T4 REFLEX  Completed    HEPATITIS C SCREENING  Completed    PHQ-2 (once per calendar year)  Completed    INFLUENZA VACCINE  Completed    Pneumococcal Vaccine: 65+ Years  Completed    IPV IMMUNIZATION  Aged Out    HPV IMMUNIZATION  Aged Out    MENINGITIS IMMUNIZATION  Aged Out    RSV MONOCLONAL ANTIBODY  Aged Out    COLORECTAL CANCER SCREENING  Discontinued         Review of Systems  Constitutional, HEENT, cardiovascular, pulmonary, GI, , musculoskeletal, neuro, skin, endocrine and psych systems are negative, except as otherwise noted.     Objective    Exam  /74   Pulse 67   Temp 97  F (36.1  C)   Resp 15   Ht 1.778 m (5' 10\")   Wt 108.9 kg (240 lb)   SpO2 98%   BMI 34.44 kg/m     Estimated body mass index is 34.44 kg/m  as calculated from the following:    Height as of this encounter: 1.778 m (5' 10\").    Weight as of this encounter: 108.9 kg (240 lb).    Physical Exam  GENERAL: alert and no distress.  BMI 34.44.  EYES: Eyes grossly normal to inspection, PERRL and conjunctivae and sclerae normal  HENT: ear canals and TM's normal, nose and mouth without ulcers or lesions  NECK: no adenopathy, no asymmetry, masses, or scars  RESP: lungs clear to auscultation - no rales, rhonchi or wheezes  CV: regular rate and rhythm, normal S1 S2, no S3 or S4, no murmur, click or rub, no peripheral edema  ABDOMEN: soft, nontender, no hepatosplenomegaly, no masses and bowel sounds normal   (male): normal male genitalia without lesions or urethral discharge, no hernia  RECTAL: " normal sphincter tone, no rectal masses, prostate normal size, smooth, nontender without nodules or masses  MS: no gross musculoskeletal defects noted, no edema  SKIN: no suspicious lesions or rashes  NEURO: Normal strength and tone, mentation intact and speech normal  PSYCH: mentation appears normal, affect normal/bright        6/7/2024   Mini Cog   Clock Draw Score 2 Normal   3 Item Recall 3 objects recalled   Mini Cog Total Score 5              Signed Electronically by: Adams Garcia MD

## 2024-06-08 LAB
ALBUMIN SERPL BCG-MCNC: 4.5 G/DL (ref 3.5–5.2)
ALP SERPL-CCNC: 66 U/L (ref 40–150)
ALT SERPL W P-5'-P-CCNC: 25 U/L (ref 0–70)
ANION GAP SERPL CALCULATED.3IONS-SCNC: 10 MMOL/L (ref 7–15)
AST SERPL W P-5'-P-CCNC: 20 U/L (ref 0–45)
BILIRUB SERPL-MCNC: 0.6 MG/DL
BUN SERPL-MCNC: 16.5 MG/DL (ref 8–23)
CALCIUM SERPL-MCNC: 9.2 MG/DL (ref 8.8–10.2)
CHLORIDE SERPL-SCNC: 104 MMOL/L (ref 98–107)
CHOLEST SERPL-MCNC: 150 MG/DL
CREAT SERPL-MCNC: 1.09 MG/DL (ref 0.67–1.17)
DEPRECATED HCO3 PLAS-SCNC: 22 MMOL/L (ref 22–29)
EGFRCR SERPLBLD CKD-EPI 2021: 70 ML/MIN/1.73M2
FASTING STATUS PATIENT QL REPORTED: ABNORMAL
FASTING STATUS PATIENT QL REPORTED: NORMAL
GLUCOSE SERPL-MCNC: 103 MG/DL (ref 70–99)
HDLC SERPL-MCNC: 41 MG/DL
LDLC SERPL CALC-MCNC: 81 MG/DL
NONHDLC SERPL-MCNC: 109 MG/DL
POTASSIUM SERPL-SCNC: 4.3 MMOL/L (ref 3.4–5.3)
PROT SERPL-MCNC: 7.1 G/DL (ref 6.4–8.3)
SHBG SERPL-SCNC: 41 NMOL/L (ref 11–80)
SODIUM SERPL-SCNC: 136 MMOL/L (ref 135–145)
TRIGL SERPL-MCNC: 139 MG/DL

## 2024-06-10 ENCOUNTER — MYC MEDICAL ADVICE (OUTPATIENT)
Dept: FAMILY MEDICINE | Facility: CLINIC | Age: 79
End: 2024-06-10
Payer: COMMERCIAL

## 2024-06-10 DIAGNOSIS — N52.9 ERECTILE DYSFUNCTION, UNSPECIFIED ERECTILE DYSFUNCTION TYPE: ICD-10-CM

## 2024-06-10 RX ORDER — SILDENAFIL 50 MG/1
50 TABLET, FILM COATED ORAL PRN
Qty: 30 TABLET | Refills: 11 | Status: SHIPPED | OUTPATIENT
Start: 2024-06-10

## 2024-06-12 LAB
TESTOST FREE SERPL-MCNC: 5.25 NG/DL
TESTOST SERPL-MCNC: 305 NG/DL (ref 240–950)

## 2024-06-27 DIAGNOSIS — I50.30 HEART FAILURE WITH PRESERVED EJECTION FRACTION, NYHA CLASS II (H): ICD-10-CM

## 2024-06-27 DIAGNOSIS — Z95.810 ICD (IMPLANTABLE CARDIOVERTER-DEFIBRILLATOR), BIVENTRICULAR, IN SITU: ICD-10-CM

## 2024-06-27 DIAGNOSIS — E78.5 HYPERLIPIDEMIA LDL GOAL <70: ICD-10-CM

## 2024-06-27 DIAGNOSIS — I44.2 THIRD DEGREE AV BLOCK (H): ICD-10-CM

## 2024-06-28 RX ORDER — METOPROLOL SUCCINATE 50 MG/1
TABLET, EXTENDED RELEASE ORAL
Qty: 90 TABLET | Refills: 0 | Status: SHIPPED | OUTPATIENT
Start: 2024-06-28 | End: 2024-07-08

## 2024-07-05 DIAGNOSIS — I50.30 HEART FAILURE WITH PRESERVED EJECTION FRACTION, NYHA CLASS II (H): ICD-10-CM

## 2024-07-05 DIAGNOSIS — I44.2 THIRD DEGREE AV BLOCK (H): ICD-10-CM

## 2024-07-05 DIAGNOSIS — Z95.810 ICD (IMPLANTABLE CARDIOVERTER-DEFIBRILLATOR), BIVENTRICULAR, IN SITU: ICD-10-CM

## 2024-07-05 DIAGNOSIS — E78.5 HYPERLIPIDEMIA LDL GOAL <70: ICD-10-CM

## 2024-07-05 RX ORDER — METOPROLOL SUCCINATE 100 MG/1
100 TABLET, EXTENDED RELEASE ORAL AT BEDTIME
Qty: 90 TABLET | Refills: 0 | Status: SHIPPED | OUTPATIENT
Start: 2024-07-05 | End: 2024-07-12

## 2024-07-05 RX ORDER — SIMVASTATIN 40 MG
TABLET ORAL
Qty: 90 TABLET | Refills: 0 | Status: SHIPPED | OUTPATIENT
Start: 2024-07-05

## 2024-07-08 DIAGNOSIS — I44.2 THIRD DEGREE AV BLOCK (H): ICD-10-CM

## 2024-07-08 DIAGNOSIS — Z95.810 ICD (IMPLANTABLE CARDIOVERTER-DEFIBRILLATOR), BIVENTRICULAR, IN SITU: ICD-10-CM

## 2024-07-08 DIAGNOSIS — E78.5 HYPERLIPIDEMIA LDL GOAL <70: ICD-10-CM

## 2024-07-08 DIAGNOSIS — I50.30 HEART FAILURE WITH PRESERVED EJECTION FRACTION, NYHA CLASS II (H): ICD-10-CM

## 2024-07-08 RX ORDER — METOPROLOL SUCCINATE 50 MG/1
50 TABLET, EXTENDED RELEASE ORAL DAILY
Qty: 90 TABLET | Refills: 3 | Status: SHIPPED | OUTPATIENT
Start: 2024-07-08 | End: 2024-07-12

## 2024-07-12 ENCOUNTER — ANCILLARY PROCEDURE (OUTPATIENT)
Dept: CARDIOLOGY | Facility: CLINIC | Age: 79
End: 2024-07-12
Attending: INTERNAL MEDICINE
Payer: COMMERCIAL

## 2024-07-12 VITALS
WEIGHT: 245 LBS | RESPIRATION RATE: 16 BRPM | SYSTOLIC BLOOD PRESSURE: 146 MMHG | BODY MASS INDEX: 35.15 KG/M2 | DIASTOLIC BLOOD PRESSURE: 72 MMHG | HEART RATE: 68 BPM

## 2024-07-12 DIAGNOSIS — Z95.810 ICD (IMPLANTABLE CARDIOVERTER-DEFIBRILLATOR), BIVENTRICULAR, IN SITU: ICD-10-CM

## 2024-07-12 DIAGNOSIS — I44.2 THIRD DEGREE AV BLOCK (H): ICD-10-CM

## 2024-07-12 DIAGNOSIS — E78.5 HYPERLIPIDEMIA LDL GOAL <70: ICD-10-CM

## 2024-07-12 DIAGNOSIS — E66.01 CLASS 2 SEVERE OBESITY DUE TO EXCESS CALORIES WITH SERIOUS COMORBIDITY IN ADULT, UNSPECIFIED BMI (H): Primary | ICD-10-CM

## 2024-07-12 DIAGNOSIS — I10 ESSENTIAL HYPERTENSION: ICD-10-CM

## 2024-07-12 DIAGNOSIS — E66.812 CLASS 2 SEVERE OBESITY DUE TO EXCESS CALORIES WITH SERIOUS COMORBIDITY IN ADULT, UNSPECIFIED BMI (H): Primary | ICD-10-CM

## 2024-07-12 DIAGNOSIS — I50.9 CONGESTIVE HEART FAILURE (CHF) (H): ICD-10-CM

## 2024-07-12 DIAGNOSIS — I50.30 HEART FAILURE WITH PRESERVED EJECTION FRACTION, NYHA CLASS II (H): ICD-10-CM

## 2024-07-12 DIAGNOSIS — I25.5 ISCHEMIC CARDIOMYOPATHY: ICD-10-CM

## 2024-07-12 LAB
MDC_IDC_LEAD_CONNECTION_STATUS: NORMAL
MDC_IDC_LEAD_IMPLANT_DT: NORMAL
MDC_IDC_LEAD_LOCATION: NORMAL
MDC_IDC_LEAD_LOCATION_DETAIL_1: NORMAL
MDC_IDC_LEAD_MFG: NORMAL
MDC_IDC_LEAD_MODEL: NORMAL
MDC_IDC_LEAD_POLARITY_TYPE: NORMAL
MDC_IDC_LEAD_SERIAL: NORMAL
MDC_IDC_LEAD_SPECIAL_FUNCTION: NORMAL
MDC_IDC_MSMT_BATTERY_DTM: NORMAL
MDC_IDC_MSMT_BATTERY_REMAINING_LONGEVITY: 24 MO
MDC_IDC_MSMT_BATTERY_REMAINING_PERCENTAGE: 35 %
MDC_IDC_MSMT_BATTERY_STATUS: NORMAL
MDC_IDC_MSMT_CAP_CHARGE_DTM: NORMAL
MDC_IDC_MSMT_CAP_CHARGE_DTM: NORMAL
MDC_IDC_MSMT_CAP_CHARGE_ENERGY: 41 J
MDC_IDC_MSMT_CAP_CHARGE_TIME: 13.4 S
MDC_IDC_MSMT_CAP_CHARGE_TIME: 8.8 S
MDC_IDC_MSMT_CAP_CHARGE_TYPE: NORMAL
MDC_IDC_MSMT_CAP_CHARGE_TYPE: NORMAL
MDC_IDC_MSMT_LEADCHNL_LV_IMPEDANCE_VALUE: 869 OHM
MDC_IDC_MSMT_LEADCHNL_LV_LEAD_CHANNEL_STATUS: NORMAL
MDC_IDC_MSMT_LEADCHNL_LV_PACING_THRESHOLD_AMPLITUDE: 0.8 V
MDC_IDC_MSMT_LEADCHNL_LV_PACING_THRESHOLD_PULSEWIDTH: 0.5 MS
MDC_IDC_MSMT_LEADCHNL_RA_IMPEDANCE_VALUE: 386 OHM
MDC_IDC_MSMT_LEADCHNL_RA_PACING_THRESHOLD_AMPLITUDE: 0.7 V
MDC_IDC_MSMT_LEADCHNL_RA_PACING_THRESHOLD_PULSEWIDTH: 0.5 MS
MDC_IDC_MSMT_LEADCHNL_RV_IMPEDANCE_VALUE: 404 OHM
MDC_IDC_MSMT_LEADCHNL_RV_PACING_THRESHOLD_AMPLITUDE: 1 V
MDC_IDC_MSMT_LEADCHNL_RV_PACING_THRESHOLD_PULSEWIDTH: 0.5 MS
MDC_IDC_PG_IMPLANT_DTM: NORMAL
MDC_IDC_PG_MFG: NORMAL
MDC_IDC_PG_MODEL: NORMAL
MDC_IDC_PG_SERIAL: NORMAL
MDC_IDC_PG_TYPE: NORMAL
MDC_IDC_SESS_CLINIC_NAME: NORMAL
MDC_IDC_SESS_DTM: NORMAL
MDC_IDC_SESS_TYPE: NORMAL
MDC_IDC_SET_BRADY_AT_MODE_SWITCH_MODE: NORMAL
MDC_IDC_SET_BRADY_AT_MODE_SWITCH_RATE: 170 {BEATS}/MIN
MDC_IDC_SET_BRADY_LOWRATE: 60 {BEATS}/MIN
MDC_IDC_SET_BRADY_MAX_SENSOR_RATE: 130 {BEATS}/MIN
MDC_IDC_SET_BRADY_MAX_TRACKING_RATE: 140 {BEATS}/MIN
MDC_IDC_SET_BRADY_MODE: NORMAL
MDC_IDC_SET_BRADY_PAV_DELAY_LOW: 180 MS
MDC_IDC_SET_BRADY_SAV_DELAY_LOW: 150 MS
MDC_IDC_SET_CRT_LVRV_DELAY: 0 MS
MDC_IDC_SET_CRT_PACED_CHAMBERS: NORMAL
MDC_IDC_SET_LEADCHNL_LV_PACING_AMPLITUDE: 2 V
MDC_IDC_SET_LEADCHNL_LV_PACING_ANODE_ELECTRODE_1: NORMAL
MDC_IDC_SET_LEADCHNL_LV_PACING_ANODE_LOCATION_1: NORMAL
MDC_IDC_SET_LEADCHNL_LV_PACING_CATHODE_ELECTRODE_1: NORMAL
MDC_IDC_SET_LEADCHNL_LV_PACING_CATHODE_LOCATION_1: NORMAL
MDC_IDC_SET_LEADCHNL_LV_PACING_PULSEWIDTH: 0.5 MS
MDC_IDC_SET_LEADCHNL_LV_SENSING_ADAPTATION_MODE: NORMAL
MDC_IDC_SET_LEADCHNL_LV_SENSING_ANODE_ELECTRODE_1: NORMAL
MDC_IDC_SET_LEADCHNL_LV_SENSING_ANODE_LOCATION_1: NORMAL
MDC_IDC_SET_LEADCHNL_LV_SENSING_CATHODE_ELECTRODE_1: NORMAL
MDC_IDC_SET_LEADCHNL_LV_SENSING_CATHODE_LOCATION_1: NORMAL
MDC_IDC_SET_LEADCHNL_LV_SENSING_SENSITIVITY: 1 MV
MDC_IDC_SET_LEADCHNL_RA_PACING_AMPLITUDE: 1.2 V
MDC_IDC_SET_LEADCHNL_RA_PACING_POLARITY: NORMAL
MDC_IDC_SET_LEADCHNL_RA_PACING_PULSEWIDTH: 0.5 MS
MDC_IDC_SET_LEADCHNL_RA_SENSING_ADAPTATION_MODE: NORMAL
MDC_IDC_SET_LEADCHNL_RA_SENSING_POLARITY: NORMAL
MDC_IDC_SET_LEADCHNL_RA_SENSING_SENSITIVITY: 0.25 MV
MDC_IDC_SET_LEADCHNL_RV_PACING_AMPLITUDE: 2 V
MDC_IDC_SET_LEADCHNL_RV_PACING_POLARITY: NORMAL
MDC_IDC_SET_LEADCHNL_RV_PACING_PULSEWIDTH: 0.5 MS
MDC_IDC_SET_LEADCHNL_RV_SENSING_ADAPTATION_MODE: NORMAL
MDC_IDC_SET_LEADCHNL_RV_SENSING_POLARITY: NORMAL
MDC_IDC_SET_LEADCHNL_RV_SENSING_SENSITIVITY: 0.6 MV
MDC_IDC_SET_ZONE_DETECTION_INTERVAL: 300 MS
MDC_IDC_SET_ZONE_DETECTION_INTERVAL: 353 MS
MDC_IDC_SET_ZONE_STATUS: NORMAL
MDC_IDC_SET_ZONE_STATUS: NORMAL
MDC_IDC_SET_ZONE_TYPE: NORMAL
MDC_IDC_SET_ZONE_TYPE: NORMAL
MDC_IDC_SET_ZONE_VENDOR_TYPE: NORMAL
MDC_IDC_SET_ZONE_VENDOR_TYPE: NORMAL
MDC_IDC_STAT_AT_BURDEN_PERCENT: 0 %
MDC_IDC_STAT_AT_DTM_END: NORMAL
MDC_IDC_STAT_AT_DTM_START: NORMAL
MDC_IDC_STAT_AT_MODE_SW_COUNT: 0
MDC_IDC_STAT_BRADY_DTM_END: NORMAL
MDC_IDC_STAT_BRADY_DTM_START: NORMAL
MDC_IDC_STAT_BRADY_RA_PERCENT_PACED: 15 %
MDC_IDC_STAT_BRADY_RV_PERCENT_PACED: 95 %
MDC_IDC_STAT_CRT_DTM_END: NORMAL
MDC_IDC_STAT_CRT_DTM_START: NORMAL
MDC_IDC_STAT_CRT_LV_PERCENT_PACED: 95 %
MDC_IDC_STAT_EPISODE_RECENT_COUNT: 0
MDC_IDC_STAT_EPISODE_RECENT_COUNT: 1
MDC_IDC_STAT_EPISODE_RECENT_COUNT_DTM_END: NORMAL
MDC_IDC_STAT_EPISODE_RECENT_COUNT_DTM_START: NORMAL
MDC_IDC_STAT_EPISODE_TYPE: NORMAL
MDC_IDC_STAT_EPISODE_VENDOR_TYPE: NORMAL
MDC_IDC_STAT_TACHYTHERAPY_ATP_DELIVERED_RECENT: 0
MDC_IDC_STAT_TACHYTHERAPY_ATP_DELIVERED_TOTAL: 1
MDC_IDC_STAT_TACHYTHERAPY_RECENT_DTM_END: NORMAL
MDC_IDC_STAT_TACHYTHERAPY_RECENT_DTM_START: NORMAL
MDC_IDC_STAT_TACHYTHERAPY_SHOCKS_ABORTED_RECENT: 0
MDC_IDC_STAT_TACHYTHERAPY_SHOCKS_ABORTED_TOTAL: 1
MDC_IDC_STAT_TACHYTHERAPY_SHOCKS_DELIVERED_RECENT: 0
MDC_IDC_STAT_TACHYTHERAPY_SHOCKS_DELIVERED_TOTAL: 2
MDC_IDC_STAT_TACHYTHERAPY_TOTAL_DTM_END: NORMAL
MDC_IDC_STAT_TACHYTHERAPY_TOTAL_DTM_START: NORMAL

## 2024-07-12 PROCEDURE — 93284 PRGRMG EVAL IMPLANTABLE DFB: CPT | Performed by: INTERNAL MEDICINE

## 2024-07-12 PROCEDURE — 99214 OFFICE O/P EST MOD 30 MIN: CPT | Performed by: INTERNAL MEDICINE

## 2024-07-12 PROCEDURE — G2211 COMPLEX E/M VISIT ADD ON: HCPCS | Performed by: INTERNAL MEDICINE

## 2024-07-12 RX ORDER — METOPROLOL SUCCINATE 50 MG/1
100 TABLET, EXTENDED RELEASE ORAL AT BEDTIME
Qty: 270 TABLET | Refills: 3 | Status: SHIPPED | OUTPATIENT
Start: 2024-07-12

## 2024-07-12 RX ORDER — LISINOPRIL 5 MG/1
10 TABLET ORAL DAILY
Qty: 180 TABLET | Refills: 3 | Status: SHIPPED | OUTPATIENT
Start: 2024-07-12

## 2024-07-12 NOTE — PROGRESS NOTES
Cardiology Clinic Office Note    Assessment / Plan:    1.  Nonischemic cardiomyopathy.  Mild, ejection fraction essentially unchanged on most recent assessment.  Advocated continued efforts at exercise, weight loss.  2.  Hypertension, with fatigue despite use of CPAP.  Will decrease metoprolol to 100 mg at bedtime, increase lisinopril to 10 mg daily.  Again reviewed restricting sodium intake.  He will monitor his blood pressure at home.  3.  Sedentary lifestyle.  Advocated 150 minutes of moderate aerobic activities each week.  They are still considering joining a gym.    Follow-up 1 year    ______________________________________________________________________    Subjective:    I had the opportunity to see Víctor Calderon at the [facility]. Víctor Calderon is a 79 year old male with a history of bradycardia.  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  He underwent CRT-D therapy.  Since then there has been gradual improvement in his ejection fraction.  An echocardiogram done 2018 showed an improvement in his ejection fraction up to 50%.  He has no history of significant coronary artery disease although a small apical fixed defect was noted on nuclear stress testing in 2015.    In 2019 he experienced a 4-hour episode of atrial fibrillation associated with biliary colic and was not placed on anticoagulation.  He has obstructive sleep apnea now On CPAP.  He presents today, accompanied by his wife       ICD check 6/2024 showed normal device function with no significant arrhythmias.  Frequent PVCs noted.  He reports stable activity tolerance though he does not engage in any regular exercise.  He shifted his metoprolol from 100 mg in the morning and 50 mg in the evening to 150 mg on the evening, with a significant improvement in his daytime alertness.  He does complain of impotence on this dose which has improved previously with reduction in his  metoprolol.  Systolic blood pressures at home around 130.  He has not experienced any chest pain or palpitations.  Still not engaged in much regular exercise since the loss of their dogs a couple of years ago.  Blood pressures generally been well-controlled.  We discussed weight control measures at some length  ______________________________________________________________________    Problem List:  Patient Active Problem List   Diagnosis    Skin Neoplasm Of Uncertain Behavior    Hypercholesterolemia    Obstructive Sleep Apnea    Esophageal Reflux    Impaired Fasting Glucose    Changed Sexual Interest (Libido): Decreased    Cholelithiasis    Laryngeal Neoplasm Of The Vocal Cord    HTN (hypertension)    Dermatitis    Hoarseness    Male Erectile Disorder    Hypogonadism    Insomnia    Benign Tubular Adenoma Of The Large Intestine    Class 1 obesity due to excess calories with serious comorbidity and body mass index (BMI) of 31.0 to 31.9 in adult    Skin Tag (___ Mm)    Tachycardia    Bradycardia    Heart block AV complete (H)    Right ventricular dysfunction    CAD (coronary artery disease)    Normochromic normocytic anemia    NSVT (nonsustained ventricular tachycardia) (H)    Ischemic cardiomyopathy    Complete AV block, acquired (H)    Adenomatous colon polyp    Tubular adenoma of colon    ROMELIA on CPAP    ICD (implantable cardioverter-defibrillator), biventricular, in situ    Bilateral hearing loss    Erectile dysfunction, unspecified erectile dysfunction type    Cholelithiasis    Paroxysmal atrial fibrillation (H)    Epigastric pain    Symptomatic cholelithiasis    Choledocholithiasis    Hypoxia    Heart failure with preserved ejection fraction, NYHA class II (H)     Medical History:  Past Medical History:   Diagnosis Date    Cholelithiasis     Coronary artery disease     Dermatitis     GERD (gastroesophageal reflux disease)     H/O cardiac arrest 2/2015    alka/torsades    Hyperlipidemia     Hypertension      Hypogonadism male     Male erectile disorder     Obesity     Sleep apnea     CPAP    Ventricular tachycardia (H)     seen on pacer interrogation     Surgical History:  Past Surgical History:   Procedure Laterality Date    CARDIAC CATHETERIZATION      COLONOSCOPY N/A 2015    Procedure: COLONOSCOPY;  Surgeon: Rafiq Gu MD;  Location: [facility];  Service:     HC REMOVE TONSILS/ADENOIDS,<13 Y/O      Description: Tonsillectomy With Adenoidectomy;  Recorded: 2008;    HERNIA REPAIR      inguinal     INSERT / REPLACE / REMOVE PACEMAKER      LAPAROSCOPIC CHOLECYSTECTOMY N/A 10/2/2019    Procedure: CHOLECYSTECTOMY, LAPAROSCOPIC;  Surgeon: Shin Baird MD;  Location: [facility] OR;  Service: General    OTHER SURGICAL HISTORY  2010    polypremoved during colonoscopy    KS ERCP DX COLLECTION SPECIMEN BRUSHING/WASHING N/A 5/10/2020    Procedure: ENDOSCOPIC RETROGRADE CHOLANGIOPANCREATOGRAPHY;  Surgeon: Simon Loaiza MD;  Location: [facility];  Service: Gastroenterology    REMOVAL OF SPERM DUCT(S)      Description: Surgery Of Male Genitalia Vasectomy;  Recorded: 2008;    TONSILLECTOMY      TUMOR REMOVAL      right vocal cord -     XR ERCP BILIARY ONLY  5/10/2020     Social History:  Social History     Socioeconomic History    Marital status:      Spouse name: Not on file    Number of children: Not on file    Years of education: Not on file    Highest education level: Not on file   Occupational History    Not on file   Tobacco Use    Smoking status: Former     Current packs/day: 0.00     Types: Cigarettes     Quit date: 2008     Years since quittin.1    Smokeless tobacco: Never   Substance and Sexual Activity    Alcohol use: No    Drug use: No    Sexual activity: Not on file   Other Topics Concern    Not on file   Social History Narrative     about 8 years ago after 44 years marriage  Now engaged to his fiance whom he found on [email or web address].   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     Social Determinants of Health     Financial Resource Strain: Low Risk  (6/2/2024)    Financial Resource Strain     Within the past 12 months, have you or your family members you live with been unable to get utilities (heat, electricity) when it was really needed?: No   Food Insecurity: Low Risk  (6/2/2024)    Food Insecurity     Within the past 12 months, did you worry that your food would run out before you got money to buy more?: No     Within the past 12 months, did the food you bought just not last and you didn t have money to get more?: No   Transportation Needs: Low Risk  (6/2/2024)    Transportation Needs     Within the past 12 months, has lack of transportation kept you from medical appointments, getting your medicines, non-medical meetings or appointments, work, or from getting things that you need?: No   Physical Activity: Insufficiently Active (6/2/2024)    Exercise Vital Sign     Days of Exercise per Week: 1 day     Minutes of Exercise per Session: 20 min   Stress: No Stress Concern Present (6/2/2024)    [employer] - Occupational Stress Questionnaire     Feeling of Stress : Not at all   Social Connections: Unknown (6/2/2024)    Social Connection and Isolation Panel [NHANES]     Frequency of Communication with Friends and Family: Not on file     Frequency of Social Gatherings with Friends and Family: Never     Attends Rastafarian Services: Not on file     Active Member of Clubs or Organizations: Not on file     Attends Club or Organization Meetings: Not on file     Marital Status: Not on file   Interpersonal Safety: Low Risk  (6/7/2024)    Interpersonal Safety     Do you feel physically and emotionally safe where you currently live?: Yes     Within the past 12 months, have you been hit, slapped, kicked or otherwise physically hurt by someone?: No     Within the past 12 months, have you been humiliated or emotionally abused in other  ways by your partner or ex-partner?: No   Housing Stability: Low Risk  (6/2/2024)    Housing Stability     Do you have housing? : Yes     Are you worried about losing your housing?: No     Sleep History:  Restorative on CPAP  Exercise History:  Occasional walks.  Poorly motivated.    Review of Systems:   Positive for joint pains.  12 point review of systems otherwise negative       Please refer above for cardiac ROS details.         Family History:  Family History   Problem Relation Age of Onset    Pneumonia Mother     Hypertension Mother     Cancer Father     Chronic Obstructive Pulmonary Disease Sister     Multiple Sclerosis Sister     Acute Myocardial Infarction No family hx of          Allergies:  Allergies   Allergen Reactions    Zithromax [Azithromycin] Rash     Medications:  Current Outpatient Medications   Medication Sig Dispense Refill    aspirin 81 MG EC tablet [ASPIRIN 81 MG EC TABLET] Take 81 mg by mouth daily.      calcium carbonate (CALCIUM CARBONATE) 300 mg (750 mg) Chew [CALCIUM CARBONATE (CALCIUM CARBONATE) 300 MG (750 MG) CHEW] Chew 2-3 tablets as needed (heartburn).             coenzyme Q10 (CO Q-10) 200 mg capsule [COENZYME Q10 (CO Q-10) 200 MG CAPSULE] Take 200 mg by mouth daily.       fish oil-omega-3 fatty acids (FISH OIL) 300-1,000 mg capsule [FISH OIL-OMEGA-3 FATTY ACIDS (FISH OIL) 300-1,000 MG CAPSULE] Take 1 g by mouth daily.      latanoprost (XALATAN) 0.005 % ophthalmic solution [LATANOPROST (XALATAN) 0.005 % OPHTHALMIC SOLUTION] Administer 1 drop to both eyes at bedtime.             lisinopril (ZESTRIL) 5 MG tablet TAKE ONE TABLET BY MOUTH ONCE DAILY 90 tablet 0    metoprolol succinate ER (TOPROL XL) 100 MG 24 hr tablet TAKE ONE TABLET BY MOUTH ONCE DAILY AT BEDTIME 90 tablet 0    metoprolol succinate ER (TOPROL XL) 50 MG 24 hr tablet Take 1 tablet (50 mg) by mouth daily In addition to the 100 mg tablet 90 tablet 3    multivitamin therapeutic tablet [MULTIVITAMIN THERAPEUTIC TABLET] Take  1 tablet by mouth daily.      nitroglycerin (NITROSTAT) 0.4 MG SL tablet [NITROGLYCERIN (NITROSTAT) 0.4 MG SL TABLET] Place 1 tablet (0.4 mg total) under the tongue every 5 (five) minutes as needed for chest pain. 100 tablet 3    omeprazole (PRILOSEC) 20 MG DR capsule TAKE 1 CAPSULE AS NEEDED 90 capsule 1    oxymetazoline (AFRIN) 0.05 % nasal spray [OXYMETAZOLINE (AFRIN) 0.05 % NASAL SPRAY] Apply 2 sprays into each nostril daily as needed for congestion.      psyllium (METAMUCIL) 3.4 gram packet [PSYLLIUM (METAMUCIL) 3.4 GRAM PACKET] Take 1 packet by mouth 2 (two) times a day.      sildenafil (VIAGRA) 50 MG tablet Take 1 tablet (50 mg) by mouth as needed 30 tablet 11    simvastatin (ZOCOR) 40 MG tablet TAKE ONE TABLET BY MOUTH ONCE DAILY AT BEDTIME. PLEASE SCHEDULE YEARLY FOLLOW UP FOR FUTURE REFILLS. 90 tablet 0    traZODone (DESYREL) 50 MG tablet [TRAZODONE (DESYREL) 50 MG TABLET] TAKE 1 TABLET AT BEDTIME (Patient taking differently: Take 50 mg by mouth as needed for sleep [TRAZODONE (DESYREL) 50 MG TABLET] TAKE 1 TABLET AT BEDTIME) 90 tablet 3    DIPHENHYDRAMINE HCL (BENADRYL ALLERGY ORAL) [DIPHENHYDRAMINE HCL (BENADRYL ALLERGY ORAL)] Take 25-50 mg by mouth every 6 (six) hours as needed (allergies).        (Patient not taking: Reported on 7/12/2024)      tadalafil (CIALIS) 20 MG tablet Take 1 tablet (20 mg) by mouth daily as needed (Patient not taking: Reported on 7/12/2024) 30 tablet 11    testosterone (ANDROGEL/TESTIM) 50 MG/5GM (1%) topical gel Place 1 packet (50 mg of testosterone) onto the skin daily Apply to the clean, dry intact skin of the shoulders, upper arms or abdomen. (Patient not taking: Reported on 7/12/2024) 90 packet 1    traMADol (ULTRAM) 50 mg tablet [TRAMADOL (ULTRAM) 50 MG TABLET] Take 1-2 tablets ( mg total) by mouth every 6 (six) hours as needed for pain. Take 1-2 tablets every 6 hours as needed for pain (Patient not taking: Reported on 7/12/2024) 15 tablet 1       Objective:   Wt  Readings from Last 3 Encounters:   07/12/24 111.1 kg (245 lb)   06/07/24 108.9 kg (240 lb)   12/07/23 115.2 kg (254 lb)     Vital signs:  BP (!) 146/72 (BP Location: Left arm, Patient Position: Sitting, Cuff Size: Adult Large)   Pulse 68   Resp 16   Wt 111.1 kg (245 lb)   BMI 35.15 kg/m        Physical Exam:    General Appearance : Awake, Alert, No acute distress tired appearing.  HEENT: No Scleral icterus; the mucous membranes were pink and moist.  Conjunctivae bilaterally injected  Neck:  No cervical bruits, jugular venous distention, or thyromegaly   Chest: The spine was straight. Chest wall symmetric  Lungs: Respirations unlabored; the lungs are clear to auscultation.  No wheezing   Cardiovascular: Nonpalpable point of maximal impulse.  Auscultation reveals regular first and second heart sounds with no murmurs, rubs, or gallops.  Carotid, radial, and dorsalis pedal pulses are intact and symmetric.    Abdomen: Obese.  No organomegaly, masses, bruits, or tenderness. Bowels sounds are present  Extremities:  No clubbing, cyanosis.  No edema  Skin: No rash, bruising  Musculoskeletal: No tenderness.  Neurologic: Alert and oriented ×3. Speech is fluent.     Lab Results    Chemistry/lipid CBC Cardiac Enzymes/BNP/TSH/INR   Recent Labs   Lab Test 06/07/24  1427   CHOL 150   HDL 41   LDL 81   TRIG 139     Recent Labs   Lab Test 06/07/24  1427 12/07/23  1311 06/07/23  1550   LDL 81 99 83     Recent Labs   Lab Test 06/07/24  1427      POTASSIUM 4.3   CHLORIDE 104   CO2 22   *   BUN 16.5   CR 1.09   GFRESTIMATED 70   GAL 9.2     Recent Labs   Lab Test 06/07/24  1427 12/07/23  1311 06/07/23  1550   CR 1.09 1.19* 1.00     Recent Labs   Lab Test 12/07/23  1311 12/15/21  1201 12/15/20  1528   A1C 5.6 5.3 5.6          Recent Labs   Lab Test 06/07/24  1427   WBC 7.7   HGB 15.8   HCT 47.4   MCV 93        Recent Labs   Lab Test 06/07/24  1427 12/07/23  1311 06/07/23  1550   HGB 15.8 15.0 15.7    Recent Labs    Lab Test 05/10/20  0443   TROPONINI <0.01     Recent Labs   Lab Test 06/12/18  1220   BNP 30     Recent Labs   Lab Test 06/07/23  1550   TSH 2.46     Recent Labs   Lab Test 11/08/19  1509 10/29/19  1136   INR 0.96 1.47*        Echocardiogram 6/2023:  The left ventricle is normal in size.  There is normal left ventricular wall thickness.  The visual ejection fraction is 45-50%.  Grade II or moderate diastolic dysfunction.  Septal wall motion abnormality may reflect pacemaker activation.  There is mild global hypokinesia of the left ventricle.  Normal right ventricle size and systolic function.  There is a pacemaker lead in the right ventricle.  The left atrium is mildly dilated.  No obvious valvular disease.       Echocardiogram 2018:    Left ventricle ejection fraction is mildly decreased. The calculated left ventricular ejection fraction is 51%.    Normal left ventricular size.    Normal right ventricular size and systolic function.    Aortic Valve: The aortic valve is sclerotic without reduced excursion. Mild aortic regurgitation.    No hemodynamically significant valvular heart abnormalities.    When compared to the previous study dated 1/4/2016, no significant change.    Pharmacologic nuclear stress test 2015:  1.  Lexiscan stress nuclear study is abnormal.  2.  No inducible ischemia is identified.  3.  Small area of nontransmural infarction involving the apex of the left ventricle  as well as the mid to distal inferior and inferoseptal segments.  4.  Reduced left ventricular ejection fraction of 33%.           SAYRA PRATHER MD [facility]  [phone]    This note created using Dragon voice recognition software.  Sound alike errors may have escaped editing.

## 2024-07-12 NOTE — PATIENT INSTRUCTIONS
Work on getting 150 minutes of moderate aerobic activities in each week.  Walking a dog would count!  Try following a low carbohydrate diet to see if this helps with weight loss.  Decrease metoprolol succinate to 100 mg at bedtime.  Increase lisinopril to 10 mg daily.  Under your blood pressure and keep a record to see if this helps with blood pressure control and daytime alertness.  You may also notice some improvement in your libido.  Call if shortness of breath or chest discomfort develops.  Follow-up 1 year

## 2024-07-12 NOTE — LETTER
7/12/2024    Adams Garcia MD  [address]  [facility] [address]    RE: Víctor Calderon       Dear Colleague,     I had the pleasure of seeing Víctor Calderon in the [facility].    Cardiology Clinic Office Note    Assessment / Plan:    1.  Nonischemic cardiomyopathy.  Mild, ejection fraction essentially unchanged on most recent assessment.  Advocated continued efforts at exercise, weight loss.  2.  Hypertension, with fatigue despite use of CPAP.  Will decrease metoprolol to 100 mg at bedtime, increase lisinopril to 10 mg daily.  Again reviewed restricting sodium intake.  He will monitor his blood pressure at home.  3.  Sedentary lifestyle.  Advocated 150 minutes of moderate aerobic activities each week.  They are still considering joining a gym.    Follow-up 1 year    ______________________________________________________________________    Subjective:    I had the opportunity to see Víctor Calderon at the [facility]. Víctor Calderon is a 79 year old male with a history of bradycardia.  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  He underwent CRT-D therapy.  Since then there has been gradual improvement in his ejection fraction.  An echocardiogram done 2018 showed an improvement in his ejection fraction up to 50%.  He has no history of significant coronary artery disease although a small apical fixed defect was noted on nuclear stress testing in 2015.    In 2019 he experienced a 4-hour episode of atrial fibrillation associated with biliary colic and was not placed on anticoagulation.  He has obstructive sleep apnea now On CPAP.  He presents today, accompanied by his wife       ICD check 6/2024 showed normal device function with no significant arrhythmias.  Frequent PVCs noted.  He reports stable activity tolerance though he does not engage in any regular exercise.  He shifted his metoprolol from 100 mg in the morning  and 50 mg in the evening to 150 mg on the evening, with a significant improvement in his daytime alertness.  He does complain of impotence on this dose which has improved previously with reduction in his metoprolol.  Systolic blood pressures at home around 130.  He has not experienced any chest pain or palpitations.  Still not engaged in much regular exercise since the loss of their dogs a couple of years ago.  Blood pressures generally been well-controlled.  We discussed weight control measures at some length  ______________________________________________________________________    Problem List:  Patient Active Problem List   Diagnosis    Skin Neoplasm Of Uncertain Behavior    Hypercholesterolemia    Obstructive Sleep Apnea    Esophageal Reflux    Impaired Fasting Glucose    Changed Sexual Interest (Libido): Decreased    Cholelithiasis    Laryngeal Neoplasm Of The Vocal Cord    HTN (hypertension)    Dermatitis    Hoarseness    Male Erectile Disorder    Hypogonadism    Insomnia    Benign Tubular Adenoma Of The Large Intestine    Class 1 obesity due to excess calories with serious comorbidity and body mass index (BMI) of 31.0 to 31.9 in adult    Skin Tag (___ Mm)    Tachycardia    Bradycardia    Heart block AV complete (H)    Right ventricular dysfunction    CAD (coronary artery disease)    Normochromic normocytic anemia    NSVT (nonsustained ventricular tachycardia) (H)    Ischemic cardiomyopathy    Complete AV block, acquired (H)    Adenomatous colon polyp    Tubular adenoma of colon    ROMELIA on CPAP    ICD (implantable cardioverter-defibrillator), biventricular, in situ    Bilateral hearing loss    Erectile dysfunction, unspecified erectile dysfunction type    Cholelithiasis    Paroxysmal atrial fibrillation (H)    Epigastric pain    Symptomatic cholelithiasis    Choledocholithiasis    Hypoxia    Heart failure with preserved ejection fraction, NYHA class II (H)     Medical History:  Past Medical History:    Diagnosis Date    Cholelithiasis     Coronary artery disease     Dermatitis     GERD (gastroesophageal reflux disease)     H/O cardiac arrest 2015    alka/torsades    Hyperlipidemia     Hypertension     Hypogonadism male     Male erectile disorder     Obesity     Sleep apnea     CPAP    Ventricular tachycardia (H)     seen on pacer interrogation     Surgical History:  Past Surgical History:   Procedure Laterality Date    CARDIAC CATHETERIZATION      COLONOSCOPY N/A 2015    Procedure: COLONOSCOPY;  Surgeon: Rafiq Gu MD;  Location: [facility];  Service:     HC REMOVE TONSILS/ADENOIDS,<11 Y/O      Description: Tonsillectomy With Adenoidectomy;  Recorded: 2008;    HERNIA REPAIR      inguinal     INSERT / REPLACE / REMOVE PACEMAKER      LAPAROSCOPIC CHOLECYSTECTOMY N/A 10/2/2019    Procedure: CHOLECYSTECTOMY, LAPAROSCOPIC;  Surgeon: Shin Baird MD;  Location: [facility];  Service: General    OTHER SURGICAL HISTORY  2010    polypremoved during colonoscopy    IL ERCP DX COLLECTION SPECIMEN BRUSHING/WASHING N/A 5/10/2020    Procedure: ENDOSCOPIC RETROGRADE CHOLANGIOPANCREATOGRAPHY;  Surgeon: Simon Loaiza MD;  Location: [facility];  Service: Gastroenterology    REMOVAL OF SPERM DUCT(S)      Description: Surgery Of Male Genitalia Vasectomy;  Recorded: 2008;    TONSILLECTOMY      TUMOR REMOVAL      right vocal cord -     XR ERCP BILIARY ONLY  5/10/2020     Social History:  Social History     Socioeconomic History    Marital status:      Spouse name: Not on file    Number of children: Not on file    Years of education: Not on file    Highest education level: Not on file   Occupational History    Not on file   Tobacco Use    Smoking status: Former     Current packs/day: 0.00     Types: Cigarettes     Quit date: 2008     Years since quittin.1    Smokeless tobacco: Never   Substance and Sexual Activity    Alcohol use: No    Drug use: No    Sexual  activity: Not on file   Other Topics Concern    Not on file   Social History Narrative     about 8 years ago after 44 years marriage  Now engaged to his fiance whom he found on [email or web address].   Has his own machine shop.     Nurymanuela Shah 2/7/2020     Social Determinants of Health     Financial Resource Strain: Low Risk  (6/2/2024)    Financial Resource Strain     Within the past 12 months, have you or your family members you live with been unable to get utilities (heat, electricity) when it was really needed?: No   Food Insecurity: Low Risk  (6/2/2024)    Food Insecurity     Within the past 12 months, did you worry that your food would run out before you got money to buy more?: No     Within the past 12 months, did the food you bought just not last and you didn t have money to get more?: No   Transportation Needs: Low Risk  (6/2/2024)    Transportation Needs     Within the past 12 months, has lack of transportation kept you from medical appointments, getting your medicines, non-medical meetings or appointments, work, or from getting things that you need?: No   Physical Activity: Insufficiently Active (6/2/2024)    Exercise Vital Sign     Days of Exercise per Week: 1 day     Minutes of Exercise per Session: 20 min   Stress: No Stress Concern Present (6/2/2024)    [employer] - Occupational Stress Questionnaire     Feeling of Stress : Not at all   Social Connections: Unknown (6/2/2024)    Social Connection and Isolation Panel [NHANES]     Frequency of Communication with Friends and Family: Not on file     Frequency of Social Gatherings with Friends and Family: Never     Attends Congregation Services: Not on file     Active Member of Clubs or Organizations: Not on file     Attends Club or Organization Meetings: Not on file     Marital Status: Not on file   Interpersonal Safety: Low Risk  (6/7/2024)    Interpersonal Safety     Do you feel physically and emotionally safe where you currently  live?: Yes     Within the past 12 months, have you been hit, slapped, kicked or otherwise physically hurt by someone?: No     Within the past 12 months, have you been humiliated or emotionally abused in other ways by your partner or ex-partner?: No   Housing Stability: Low Risk  (6/2/2024)    Housing Stability     Do you have housing? : Yes     Are you worried about losing your housing?: No     Sleep History:  Restorative on CPAP  Exercise History:  Occasional walks.  Poorly motivated.    Review of Systems:   Positive for joint pains.  12 point review of systems otherwise negative       Please refer above for cardiac ROS details.         Family History:  Family History   Problem Relation Age of Onset    Pneumonia Mother     Hypertension Mother     Cancer Father     Chronic Obstructive Pulmonary Disease Sister     Multiple Sclerosis Sister     Acute Myocardial Infarction No family hx of          Allergies:  Allergies   Allergen Reactions    Zithromax [Azithromycin] Rash     Medications:  Current Outpatient Medications   Medication Sig Dispense Refill    aspirin 81 MG EC tablet [ASPIRIN 81 MG EC TABLET] Take 81 mg by mouth daily.      calcium carbonate (CALCIUM CARBONATE) 300 mg (750 mg) Chew [CALCIUM CARBONATE (CALCIUM CARBONATE) 300 MG (750 MG) CHEW] Chew 2-3 tablets as needed (heartburn).             coenzyme Q10 (CO Q-10) 200 mg capsule [COENZYME Q10 (CO Q-10) 200 MG CAPSULE] Take 200 mg by mouth daily.       fish oil-omega-3 fatty acids (FISH OIL) 300-1,000 mg capsule [FISH OIL-OMEGA-3 FATTY ACIDS (FISH OIL) 300-1,000 MG CAPSULE] Take 1 g by mouth daily.      latanoprost (XALATAN) 0.005 % ophthalmic solution [LATANOPROST (XALATAN) 0.005 % OPHTHALMIC SOLUTION] Administer 1 drop to both eyes at bedtime.             lisinopril (ZESTRIL) 5 MG tablet TAKE ONE TABLET BY MOUTH ONCE DAILY 90 tablet 0    metoprolol succinate ER (TOPROL XL) 100 MG 24 hr tablet TAKE ONE TABLET BY MOUTH ONCE DAILY AT BEDTIME 90 tablet 0     metoprolol succinate ER (TOPROL XL) 50 MG 24 hr tablet Take 1 tablet (50 mg) by mouth daily In addition to the 100 mg tablet 90 tablet 3    multivitamin therapeutic tablet [MULTIVITAMIN THERAPEUTIC TABLET] Take 1 tablet by mouth daily.      nitroglycerin (NITROSTAT) 0.4 MG SL tablet [NITROGLYCERIN (NITROSTAT) 0.4 MG SL TABLET] Place 1 tablet (0.4 mg total) under the tongue every 5 (five) minutes as needed for chest pain. 100 tablet 3    omeprazole (PRILOSEC) 20 MG DR capsule TAKE 1 CAPSULE AS NEEDED 90 capsule 1    oxymetazoline (AFRIN) 0.05 % nasal spray [OXYMETAZOLINE (AFRIN) 0.05 % NASAL SPRAY] Apply 2 sprays into each nostril daily as needed for congestion.      psyllium (METAMUCIL) 3.4 gram packet [PSYLLIUM (METAMUCIL) 3.4 GRAM PACKET] Take 1 packet by mouth 2 (two) times a day.      sildenafil (VIAGRA) 50 MG tablet Take 1 tablet (50 mg) by mouth as needed 30 tablet 11    simvastatin (ZOCOR) 40 MG tablet TAKE ONE TABLET BY MOUTH ONCE DAILY AT BEDTIME. PLEASE SCHEDULE YEARLY FOLLOW UP FOR FUTURE REFILLS. 90 tablet 0    traZODone (DESYREL) 50 MG tablet [TRAZODONE (DESYREL) 50 MG TABLET] TAKE 1 TABLET AT BEDTIME (Patient taking differently: Take 50 mg by mouth as needed for sleep [TRAZODONE (DESYREL) 50 MG TABLET] TAKE 1 TABLET AT BEDTIME) 90 tablet 3    DIPHENHYDRAMINE HCL (BENADRYL ALLERGY ORAL) [DIPHENHYDRAMINE HCL (BENADRYL ALLERGY ORAL)] Take 25-50 mg by mouth every 6 (six) hours as needed (allergies).        (Patient not taking: Reported on 7/12/2024)      tadalafil (CIALIS) 20 MG tablet Take 1 tablet (20 mg) by mouth daily as needed (Patient not taking: Reported on 7/12/2024) 30 tablet 11    testosterone (ANDROGEL/TESTIM) 50 MG/5GM (1%) topical gel Place 1 packet (50 mg of testosterone) onto the skin daily Apply to the clean, dry intact skin of the shoulders, upper arms or abdomen. (Patient not taking: Reported on 7/12/2024) 90 packet 1    traMADol (ULTRAM) 50 mg tablet [TRAMADOL (ULTRAM) 50 MG TABLET] Take  1-2 tablets ( mg total) by mouth every 6 (six) hours as needed for pain. Take 1-2 tablets every 6 hours as needed for pain (Patient not taking: Reported on 7/12/2024) 15 tablet 1       Objective:   Wt Readings from Last 3 Encounters:   07/12/24 111.1 kg (245 lb)   06/07/24 108.9 kg (240 lb)   12/07/23 115.2 kg (254 lb)     Vital signs:  BP (!) 146/72 (BP Location: Left arm, Patient Position: Sitting, Cuff Size: Adult Large)   Pulse 68   Resp 16   Wt 111.1 kg (245 lb)   BMI 35.15 kg/m        Physical Exam:    General Appearance : Awake, Alert, No acute distress tired appearing.  HEENT: No Scleral icterus; the mucous membranes were pink and moist.  Conjunctivae bilaterally injected  Neck:  No cervical bruits, jugular venous distention, or thyromegaly   Chest: The spine was straight. Chest wall symmetric  Lungs: Respirations unlabored; the lungs are clear to auscultation.  No wheezing   Cardiovascular: Nonpalpable point of maximal impulse.  Auscultation reveals regular first and second heart sounds with no murmurs, rubs, or gallops.  Carotid, radial, and dorsalis pedal pulses are intact and symmetric.    Abdomen: Obese.  No organomegaly, masses, bruits, or tenderness. Bowels sounds are present  Extremities:  No clubbing, cyanosis.  No edema  Skin: No rash, bruising  Musculoskeletal: No tenderness.  Neurologic: Alert and oriented ×3. Speech is fluent.     Lab Results    Chemistry/lipid CBC Cardiac Enzymes/BNP/TSH/INR   Recent Labs   Lab Test 06/07/24  1427   CHOL 150   HDL 41   LDL 81   TRIG 139     Recent Labs   Lab Test 06/07/24  1427 12/07/23  1311 06/07/23  1550   LDL 81 99 83     Recent Labs   Lab Test 06/07/24  1427      POTASSIUM 4.3   CHLORIDE 104   CO2 22   *   BUN 16.5   CR 1.09   GFRESTIMATED 70   GAL 9.2     Recent Labs   Lab Test 06/07/24  1427 12/07/23  1311 06/07/23  1550   CR 1.09 1.19* 1.00     Recent Labs   Lab Test 12/07/23  1311 12/15/21  1201 12/15/20  1528   A1C 5.6 5.3 5.6           Recent Labs   Lab Test 06/07/24  1427   WBC 7.7   HGB 15.8   HCT 47.4   MCV 93        Recent Labs   Lab Test 06/07/24  1427 12/07/23  1311 06/07/23  1550   HGB 15.8 15.0 15.7    Recent Labs   Lab Test 05/10/20  0443   TROPONINI <0.01     Recent Labs   Lab Test 06/12/18  1220   BNP 30     Recent Labs   Lab Test 06/07/23  1550   TSH 2.46     Recent Labs   Lab Test 11/08/19  1509 10/29/19  1136   INR 0.96 1.47*        Echocardiogram 6/2023:  The left ventricle is normal in size.  There is normal left ventricular wall thickness.  The visual ejection fraction is 45-50%.  Grade II or moderate diastolic dysfunction.  Septal wall motion abnormality may reflect pacemaker activation.  There is mild global hypokinesia of the left ventricle.  Normal right ventricle size and systolic function.  There is a pacemaker lead in the right ventricle.  The left atrium is mildly dilated.  No obvious valvular disease.       Echocardiogram 2018:    Left ventricle ejection fraction is mildly decreased. The calculated left ventricular ejection fraction is 51%.    Normal left ventricular size.    Normal right ventricular size and systolic function.    Aortic Valve: The aortic valve is sclerotic without reduced excursion. Mild aortic regurgitation.    No hemodynamically significant valvular heart abnormalities.    When compared to the previous study dated 1/4/2016, no significant change.    Pharmacologic nuclear stress test 2015:  1.  Lexiscan stress nuclear study is abnormal.  2.  No inducible ischemia is identified.  3.  Small area of nontransmural infarction involving the apex of the left ventricle  as well as the mid to distal inferior and inferoseptal segments.  4.  Reduced left ventricular ejection fraction of 33%.           SAYRA PRATHER MD [facility]  [phone]    This note created using Dragon voice recognition software.  Sound alike errors may have escaped editing.       Thank you for allowing me to participate in  the care of your patient.      Sincerely,   Rafal Frost MD   [facility]  cc:   Trenton Boggs MD  [address]  [place] [address]

## 2024-08-30 ENCOUNTER — MYC MEDICAL ADVICE (OUTPATIENT)
Dept: FAMILY MEDICINE | Facility: CLINIC | Age: 79
End: 2024-08-30
Payer: COMMERCIAL

## 2024-08-30 NOTE — TELEPHONE ENCOUNTER
Patient Quality Outreach    Patient is due for the following:   Hypertension -  BP check    Next Steps:   Patient needs to check their blood pressure and send their blood pressure readings into the clinic for a provider to review.    Type of outreach:    Sent [employer] message.      Questions for provider review:    None           Gabriela Wooten RN

## 2024-10-17 ENCOUNTER — ANCILLARY PROCEDURE (OUTPATIENT)
Dept: CARDIOLOGY | Facility: CLINIC | Age: 79
End: 2024-10-17
Attending: INTERNAL MEDICINE
Payer: COMMERCIAL

## 2024-10-17 DIAGNOSIS — I25.5 ISCHEMIC CARDIOMYOPATHY: ICD-10-CM

## 2024-10-17 DIAGNOSIS — I50.9 CHF (CONGESTIVE HEART FAILURE) (H): ICD-10-CM

## 2024-10-17 DIAGNOSIS — Z95.810 BIVENTRICULAR ICD (IMPLANTABLE CARDIOVERTER-DEFIBRILLATOR) IN PLACE: ICD-10-CM

## 2024-10-17 LAB
MDC_IDC_EPISODE_DTM: NORMAL
MDC_IDC_EPISODE_DTM: NORMAL
MDC_IDC_EPISODE_ID: NORMAL
MDC_IDC_EPISODE_ID: NORMAL
MDC_IDC_EPISODE_TYPE: NORMAL
MDC_IDC_EPISODE_TYPE: NORMAL
MDC_IDC_LEAD_CONNECTION_STATUS: NORMAL
MDC_IDC_LEAD_IMPLANT_DT: NORMAL
MDC_IDC_LEAD_LOCATION: NORMAL
MDC_IDC_LEAD_LOCATION_DETAIL_1: NORMAL
MDC_IDC_LEAD_MFG: NORMAL
MDC_IDC_LEAD_MODEL: NORMAL
MDC_IDC_LEAD_POLARITY_TYPE: NORMAL
MDC_IDC_LEAD_SERIAL: NORMAL
MDC_IDC_LEAD_SPECIAL_FUNCTION: NORMAL
MDC_IDC_MSMT_BATTERY_DTM: NORMAL
MDC_IDC_MSMT_BATTERY_REMAINING_LONGEVITY: 18 MO
MDC_IDC_MSMT_BATTERY_REMAINING_PERCENTAGE: 31 %
MDC_IDC_MSMT_BATTERY_STATUS: NORMAL
MDC_IDC_MSMT_CAP_CHARGE_DTM: NORMAL
MDC_IDC_MSMT_CAP_CHARGE_DTM: NORMAL
MDC_IDC_MSMT_CAP_CHARGE_ENERGY: 41 J
MDC_IDC_MSMT_CAP_CHARGE_TIME: 13.8 S
MDC_IDC_MSMT_CAP_CHARGE_TIME: 8.8 S
MDC_IDC_MSMT_CAP_CHARGE_TYPE: NORMAL
MDC_IDC_MSMT_CAP_CHARGE_TYPE: NORMAL
MDC_IDC_MSMT_LEADCHNL_LV_IMPEDANCE_VALUE: 801 OHM
MDC_IDC_MSMT_LEADCHNL_LV_PACING_THRESHOLD_AMPLITUDE: 0.8 V
MDC_IDC_MSMT_LEADCHNL_LV_PACING_THRESHOLD_PULSEWIDTH: 0.5 MS
MDC_IDC_MSMT_LEADCHNL_RA_IMPEDANCE_VALUE: 366 OHM
MDC_IDC_MSMT_LEADCHNL_RV_IMPEDANCE_VALUE: 378 OHM
MDC_IDC_PG_IMPLANT_DTM: NORMAL
MDC_IDC_PG_MFG: NORMAL
MDC_IDC_PG_MODEL: NORMAL
MDC_IDC_PG_SERIAL: NORMAL
MDC_IDC_PG_TYPE: NORMAL
MDC_IDC_SESS_CLINIC_NAME: NORMAL
MDC_IDC_SESS_DTM: NORMAL
MDC_IDC_SESS_TYPE: NORMAL
MDC_IDC_SET_BRADY_AT_MODE_SWITCH_MODE: NORMAL
MDC_IDC_SET_BRADY_AT_MODE_SWITCH_RATE: 170 {BEATS}/MIN
MDC_IDC_SET_BRADY_LOWRATE: 60 {BEATS}/MIN
MDC_IDC_SET_BRADY_MAX_SENSOR_RATE: 130 {BEATS}/MIN
MDC_IDC_SET_BRADY_MAX_TRACKING_RATE: 140 {BEATS}/MIN
MDC_IDC_SET_BRADY_MODE: NORMAL
MDC_IDC_SET_BRADY_PAV_DELAY_LOW: 180 MS
MDC_IDC_SET_BRADY_SAV_DELAY_LOW: 150 MS
MDC_IDC_SET_CRT_LVRV_DELAY: 0 MS
MDC_IDC_SET_CRT_PACED_CHAMBERS: NORMAL
MDC_IDC_SET_LEADCHNL_LV_PACING_AMPLITUDE: 2 V
MDC_IDC_SET_LEADCHNL_LV_PACING_ANODE_ELECTRODE_1: NORMAL
MDC_IDC_SET_LEADCHNL_LV_PACING_ANODE_LOCATION_1: NORMAL
MDC_IDC_SET_LEADCHNL_LV_PACING_CATHODE_ELECTRODE_1: NORMAL
MDC_IDC_SET_LEADCHNL_LV_PACING_CATHODE_LOCATION_1: NORMAL
MDC_IDC_SET_LEADCHNL_LV_PACING_PULSEWIDTH: 0.5 MS
MDC_IDC_SET_LEADCHNL_LV_SENSING_ADAPTATION_MODE: NORMAL
MDC_IDC_SET_LEADCHNL_LV_SENSING_ANODE_ELECTRODE_1: NORMAL
MDC_IDC_SET_LEADCHNL_LV_SENSING_ANODE_LOCATION_1: NORMAL
MDC_IDC_SET_LEADCHNL_LV_SENSING_CATHODE_ELECTRODE_1: NORMAL
MDC_IDC_SET_LEADCHNL_LV_SENSING_CATHODE_LOCATION_1: NORMAL
MDC_IDC_SET_LEADCHNL_LV_SENSING_SENSITIVITY: 1 MV
MDC_IDC_SET_LEADCHNL_RA_PACING_AMPLITUDE: 1.2 V
MDC_IDC_SET_LEADCHNL_RA_PACING_POLARITY: NORMAL
MDC_IDC_SET_LEADCHNL_RA_PACING_PULSEWIDTH: 0.5 MS
MDC_IDC_SET_LEADCHNL_RA_SENSING_ADAPTATION_MODE: NORMAL
MDC_IDC_SET_LEADCHNL_RA_SENSING_POLARITY: NORMAL
MDC_IDC_SET_LEADCHNL_RA_SENSING_SENSITIVITY: 0.25 MV
MDC_IDC_SET_LEADCHNL_RV_PACING_AMPLITUDE: 2 V
MDC_IDC_SET_LEADCHNL_RV_PACING_POLARITY: NORMAL
MDC_IDC_SET_LEADCHNL_RV_PACING_PULSEWIDTH: 0.5 MS
MDC_IDC_SET_LEADCHNL_RV_SENSING_ADAPTATION_MODE: NORMAL
MDC_IDC_SET_LEADCHNL_RV_SENSING_POLARITY: NORMAL
MDC_IDC_SET_LEADCHNL_RV_SENSING_SENSITIVITY: 0.6 MV
MDC_IDC_SET_ZONE_DETECTION_INTERVAL: 300 MS
MDC_IDC_SET_ZONE_DETECTION_INTERVAL: 353 MS
MDC_IDC_SET_ZONE_STATUS: NORMAL
MDC_IDC_SET_ZONE_STATUS: NORMAL
MDC_IDC_SET_ZONE_TYPE: NORMAL
MDC_IDC_SET_ZONE_TYPE: NORMAL
MDC_IDC_SET_ZONE_VENDOR_TYPE: NORMAL
MDC_IDC_SET_ZONE_VENDOR_TYPE: NORMAL
MDC_IDC_STAT_AT_BURDEN_PERCENT: 0 %
MDC_IDC_STAT_AT_DTM_END: NORMAL
MDC_IDC_STAT_AT_DTM_START: NORMAL
MDC_IDC_STAT_BRADY_DTM_END: NORMAL
MDC_IDC_STAT_BRADY_DTM_START: NORMAL
MDC_IDC_STAT_BRADY_RA_PERCENT_PACED: 11 %
MDC_IDC_STAT_BRADY_RV_PERCENT_PACED: 96 %
MDC_IDC_STAT_CRT_DTM_END: NORMAL
MDC_IDC_STAT_CRT_DTM_START: NORMAL
MDC_IDC_STAT_CRT_LV_PERCENT_PACED: 96 %
MDC_IDC_STAT_EPISODE_RECENT_COUNT: 0
MDC_IDC_STAT_EPISODE_RECENT_COUNT_DTM_END: NORMAL
MDC_IDC_STAT_EPISODE_RECENT_COUNT_DTM_START: NORMAL
MDC_IDC_STAT_EPISODE_TYPE: NORMAL
MDC_IDC_STAT_EPISODE_VENDOR_TYPE: NORMAL
MDC_IDC_STAT_TACHYTHERAPY_ATP_DELIVERED_RECENT: 0
MDC_IDC_STAT_TACHYTHERAPY_ATP_DELIVERED_TOTAL: 1
MDC_IDC_STAT_TACHYTHERAPY_RECENT_DTM_END: NORMAL
MDC_IDC_STAT_TACHYTHERAPY_RECENT_DTM_START: NORMAL
MDC_IDC_STAT_TACHYTHERAPY_SHOCKS_ABORTED_RECENT: 0
MDC_IDC_STAT_TACHYTHERAPY_SHOCKS_ABORTED_TOTAL: 1
MDC_IDC_STAT_TACHYTHERAPY_SHOCKS_DELIVERED_RECENT: 0
MDC_IDC_STAT_TACHYTHERAPY_SHOCKS_DELIVERED_TOTAL: 2
MDC_IDC_STAT_TACHYTHERAPY_TOTAL_DTM_END: NORMAL
MDC_IDC_STAT_TACHYTHERAPY_TOTAL_DTM_START: NORMAL

## 2024-10-17 PROCEDURE — 93295 DEV INTERROG REMOTE 1/2/MLT: CPT | Performed by: INTERNAL MEDICINE

## 2024-10-17 PROCEDURE — 93296 REM INTERROG EVL PM/IDS: CPT | Performed by: INTERNAL MEDICINE

## 2024-10-21 DIAGNOSIS — E78.5 HYPERLIPIDEMIA LDL GOAL <70: ICD-10-CM

## 2024-10-21 RX ORDER — SIMVASTATIN 40 MG
TABLET ORAL
Qty: 90 TABLET | Refills: 3 | Status: SHIPPED | OUTPATIENT
Start: 2024-10-21

## 2024-10-22 DIAGNOSIS — E78.5 HYPERLIPIDEMIA LDL GOAL <70: ICD-10-CM

## 2024-10-22 DIAGNOSIS — I44.2 THIRD DEGREE AV BLOCK (H): ICD-10-CM

## 2024-10-22 DIAGNOSIS — Z95.810 ICD (IMPLANTABLE CARDIOVERTER-DEFIBRILLATOR), BIVENTRICULAR, IN SITU: ICD-10-CM

## 2024-10-22 DIAGNOSIS — I50.30 HEART FAILURE WITH PRESERVED EJECTION FRACTION, NYHA CLASS II (H): ICD-10-CM

## 2024-10-22 RX ORDER — METOPROLOL SUCCINATE 100 MG/1
100 TABLET, EXTENDED RELEASE ORAL AT BEDTIME
Qty: 90 TABLET | Refills: 0 | Status: SHIPPED | OUTPATIENT
Start: 2024-10-22

## 2024-11-20 ENCOUNTER — TELEPHONE (OUTPATIENT)
Dept: FAMILY MEDICINE | Facility: CLINIC | Age: 79
End: 2024-11-20
Payer: COMMERCIAL

## 2024-11-20 NOTE — TELEPHONE ENCOUNTER
General Call      Reason for Call:  Patient would like to combine his htn OV from 12/4 with preop on 12/3 if possible to save him a second drive over to clinic, if acceptable to provider.    What are your questions or concerns:  n/a    Could we send this information to you in [employer] or would you prefer to receive a phone call?:   Patient would like to be contacted via [employer]

## 2024-12-03 ENCOUNTER — OFFICE VISIT (OUTPATIENT)
Dept: FAMILY MEDICINE | Facility: CLINIC | Age: 79
End: 2024-12-03
Payer: COMMERCIAL

## 2024-12-03 VITALS
SYSTOLIC BLOOD PRESSURE: 136 MMHG | WEIGHT: 230 LBS | RESPIRATION RATE: 15 BRPM | TEMPERATURE: 97.7 F | BODY MASS INDEX: 32.93 KG/M2 | DIASTOLIC BLOOD PRESSURE: 70 MMHG | HEART RATE: 75 BPM | HEIGHT: 70 IN | OXYGEN SATURATION: 97 %

## 2024-12-03 DIAGNOSIS — R73.01 IMPAIRED FASTING GLUCOSE: ICD-10-CM

## 2024-12-03 DIAGNOSIS — G47.33 OSA ON CPAP: ICD-10-CM

## 2024-12-03 DIAGNOSIS — D12.6 ADENOMATOUS POLYP OF COLON, UNSPECIFIED PART OF COLON: ICD-10-CM

## 2024-12-03 DIAGNOSIS — F41.9 ANXIETY: ICD-10-CM

## 2024-12-03 DIAGNOSIS — E78.00 PURE HYPERCHOLESTEROLEMIA: ICD-10-CM

## 2024-12-03 DIAGNOSIS — I10 ESSENTIAL HYPERTENSION: ICD-10-CM

## 2024-12-03 DIAGNOSIS — Z01.818 PREOPERATIVE EXAMINATION: Primary | ICD-10-CM

## 2024-12-03 DIAGNOSIS — Z23 ENCOUNTER FOR IMMUNIZATION: ICD-10-CM

## 2024-12-03 DIAGNOSIS — I47.29 NSVT (NONSUSTAINED VENTRICULAR TACHYCARDIA) (H): ICD-10-CM

## 2024-12-03 DIAGNOSIS — I50.30 HEART FAILURE WITH PRESERVED EJECTION FRACTION, NYHA CLASS II (H): ICD-10-CM

## 2024-12-03 LAB
EST. AVERAGE GLUCOSE BLD GHB EST-MCNC: 117 MG/DL
HBA1C MFR BLD: 5.7 % (ref 0–5.6)

## 2024-12-03 PROCEDURE — 80061 LIPID PANEL: CPT | Performed by: FAMILY MEDICINE

## 2024-12-03 PROCEDURE — 93005 ELECTROCARDIOGRAM TRACING: CPT | Performed by: FAMILY MEDICINE

## 2024-12-03 PROCEDURE — 93010 ELECTROCARDIOGRAM REPORT: CPT | Performed by: GENERAL ACUTE CARE HOSPITAL

## 2024-12-03 PROCEDURE — 91320 SARSCV2 VAC 30MCG TRS-SUC IM: CPT | Performed by: FAMILY MEDICINE

## 2024-12-03 PROCEDURE — 90662 IIV NO PRSV INCREASED AG IM: CPT | Performed by: FAMILY MEDICINE

## 2024-12-03 PROCEDURE — 36415 COLL VENOUS BLD VENIPUNCTURE: CPT | Performed by: FAMILY MEDICINE

## 2024-12-03 PROCEDURE — G2211 COMPLEX E/M VISIT ADD ON: HCPCS | Performed by: FAMILY MEDICINE

## 2024-12-03 PROCEDURE — 99214 OFFICE O/P EST MOD 30 MIN: CPT | Performed by: FAMILY MEDICINE

## 2024-12-03 PROCEDURE — 83036 HEMOGLOBIN GLYCOSYLATED A1C: CPT | Performed by: FAMILY MEDICINE

## 2024-12-03 PROCEDURE — 90480 ADMN SARSCOV2 VAC 1/ONLY CMP: CPT | Performed by: FAMILY MEDICINE

## 2024-12-03 PROCEDURE — 80048 BASIC METABOLIC PNL TOTAL CA: CPT | Performed by: FAMILY MEDICINE

## 2024-12-03 PROCEDURE — G0008 ADMIN INFLUENZA VIRUS VAC: HCPCS | Performed by: FAMILY MEDICINE

## 2024-12-03 ASSESSMENT — PAIN SCALES - GENERAL: PAINLEVEL_OUTOF10: NO PAIN (0)

## 2024-12-03 NOTE — PROGRESS NOTES
Preoperative Evaluation  [facility]  [address]  [facility] [address]  Phone: [phone]  Fax: [phone]  Primary Provider: Adams Garcia MD  Pre-op Performing Provider: Adams Garcia MD  Dec 3, 2024             11/28/2024   Surgical Information   What procedure is being done? colonoscpy    Facility or Hospital where procedure/surgery will be performed: [facility]    Who is doing the procedure / surgery? [facility]    Date of surgery / procedure: 12/17/2024    Time of surgery / procedure: To be announced.    Where do you plan to recover after surgery? at home with family        Patient-reported     Fax number for surgical facility: Note does not need to be faxed, will be available electronically in Epic.    Assessment & Plan     The proposed surgical procedure is considered INTERMEDIATE risk.    Preoperative examination  Preoperative examination completed.  No contraindication to scheduled colonoscopy with history of adenomatous colon polyps described.  Prior colonoscopy September 30, 2015 with 5-year follow-up recommendation previously noted.    Adenomatous polyp of colon, unspecified part of colon  As above, history of adenomatous colon polyp and will schedule for colonoscopy completion as noted December 17, 2024.    Essential hypertension  Patient was to decrease metoprolol succinate from 150 mg down to 100 mg at bedtime and increase lisinopril from 5 mg up to 10 mg daily following prior office visit with Dr. Sarmiento cardiology.  - BASIC METABOLIC PANEL    Hypercholesterolemia  Lipid cascade updated today.  - Lipid panel reflex to direct LDL Fasting    Heart failure with preserved ejection fraction, NYHA class II (H)  Heart failure with history of preserved ejection fraction.  Most recent EF 45 to 50% on echocardiogram 2023 which appeared stable.    ROMELIA on CPAP  CPAP for ROMELIA management ongoing.    NSVT (nonsustained ventricular tachycardia) (H)  ICD in place.  EKG showing  atrial sensed ventricular paced rhythm.  - EKG 12-lead, tracing only  - EKG 12-lead, tracing only    Anxiety  Anxiety and life stressors described and will utilize mental health counseling services to further assist.  Referral placed.  - Adult Mental Health  Referral    Encounter for immunization  High-dose flu shot and COVID booster provided.  - INFLUENZA HIGH DOSE, TRIVALENT, PF (FLUZONE)  - COVID-19 12+ (PFIZER)    Impaired fasting glucose  Impaired fasting glucose updated.  A1c obtained.  Fasting glucose pending.  - Hemoglobin A1c         Implanted Device  ICD in place       - No identified additional risk factors other than previously addressed         Recommendation  Approval given to proceed with proposed procedure, without further diagnostic evaluation.    Richa Rucker is a 79 year old, presenting for the following:  Pre-Op Exam (Colonoscopy - Dr. Grace - )          12/3/2024    11:46 AM   Additional Questions   Roomed by [facility] related to upcoming procedure: Patient seen today for preoperative clearance.  As scheduled colonoscopy December 17, 2024.  Prior colonoscopy September 30, 2015 with adenomatous colon polyp historically and was told to follow-up at 5-year interval.  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  He underwent CRT-D therapy.  Gradual improvement in EF.  Echocardiogram in 2018 showing EF around 50% and follow-up echocardiogram 2023 between 45 to 50%.  No history of significant coronary artery disease although small apical fixed defect was noted on nuclear stress testing in 2015.  Noted 4-hour episode of atrial fibrillation associate with biliary colic back in 2019 therefore not placed on anticoagulation.  ROMELIA not on CPAP.  Frequent PVCs previously noted otherwise remains asymptomatic.  Improvement with metoprolol succinate 150 mg in the evening.  Nonischemic cardiomyopathy described as mild with ongoing efforts  "at exercise and weight loss.  Now able to use exercycle for up to 20 minutes at a time.  Has lost 27 pounds since weight max of 257 pounds described.  Some financial stressors and difficulties in marriage and is interested in counseling services at this time as well.  Comprehensive review of systems as above otherwise all negative.      Reviewed office note from Dr. Frost from 24:  Patient was to decrease metoprolol succinate from 150 mg down to 100 mg at bedtime and increase lisinopril from 5 mg up to 10 mg daily.         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 left s/p T & A as child; dual chamber pacemaker placement on 2/9/15 after cardiac arrest.  Quit EtOH 02  ROMELIA on CPAP at 8 cm H2O          2024   Pre-Op Questionnaire   Have you ever had a heart attack or stroke? (!) YES cardiac arrest 2015    Have you ever had surgery on your heart or blood vessels, such as a stent placement, a coronary artery bypass, or surgery on an artery in your head, neck, heart, or legs? (!) YES      Do you have chest pain with activity? No    Do you have a history of heart failure? (!) YES EF = 45-50%    Do you currently have a cold, bronchitis or symptoms of other infection? No    Do you have a cough, shortness of breath, or wheezing? No    Do you or anyone in your family have previous history of blood clots? (!) UNKNOWN      Do you or does anyone in your family have a serious bleeding problem such as prolonged bleeding following surgeries or cuts? No    Have you ever had " problems with anemia or been told to take iron pills? No    Have you had any abnormal blood loss such as black, tarry or bloody stools? No    Have you ever had a blood transfusion? No    Are you willing to have a blood transfusion if it is medically needed before, during, or after your surgery? Yes    Have you or any of your relatives ever had problems with anesthesia? No    Do you have sleep apnea, excessive snoring or daytime drowsiness? (!) YES    Do you have a CPAP machine? Yes    Do you have any artifical heart valves or other implanted medical devices like a pacemaker, defibrillator, or continuous glucose monitor? (!) YES    What type of device do you have? Pacemaker with defibulator    Name of the clinic that manages your device [facility] in [place].    Do you have artificial joints? No    Are you allergic to latex? No        Patient-reported     Health Care Directive  Patient does not have a Health Care Directive: Patient states has Advance Directive and will bring in a copy to clinic.    Preoperative Review of    reviewed - no record of controlled substances prescribed.      Status of Chronic Conditions:  See problem list for active medical problems.  Problems all longstanding and stable, except as noted/documented.  See ROS for pertinent symptoms related to these conditions.    Patient Active Problem List    Diagnosis Date Noted    Class 2 severe obesity due to excess calories with serious comorbidity in adult (H) 07/12/2024     Priority: Medium    Heart failure with preserved ejection fraction, NYHA class II (H) 06/07/2024     Priority: Medium    Epigastric pain 05/10/2020     Priority: Medium    Symptomatic cholelithiasis 05/10/2020     Priority: Medium    Choledocholithiasis 05/10/2020     Priority: Medium     Added automatically from request for surgery [id number]        Hypoxia 05/10/2020     Priority: Medium    HTN (hypertension)      Priority: Medium     Created by  Conversion  Replacement Utility updated for latest IMO load        Paroxysmal atrial fibrillation (H) 09/16/2019     Priority: Medium    Cholelithiasis 09/11/2019     Priority: Medium     Added automatically from request for surgery [id number]        Class 1 obesity due to excess calories with serious comorbidity and body mass index (BMI) of 31.0 to 31.9 in adult      Priority: Medium     Created by Conversion        Bilateral hearing loss 11/15/2017     Priority: Medium    Erectile dysfunction, unspecified erectile dysfunction type 11/15/2017     Priority: Medium    ICD (implantable cardioverter-defibrillator), biventricular, in situ 04/25/2017     Priority: Medium     [employer] G148 INOGEN X4 CRT-D  DOI: 6/29/2015        Tubular adenoma of colon 11/17/2016     Priority: Medium    ROMELIA on CPAP 11/17/2016     Priority: Medium    Cholelithiasis      Priority: Medium     Created by Conversion  Replacement Utility updated for latest IMO load        Laryngeal Neoplasm Of The Vocal Cord      Priority: Medium     Created by Conversion  [facility] Annotation: Feb 22 2008  7:26Adams Shafer: T1 cancer of   vocal cordRXT through MOHPADr. [employer]  Replacement Utility updated for latest IMO load        Skin Tag (___ Mm)      Priority: Medium     Created by Conversion  Replacement Utility updated for latest IMO load        Adenomatous colon polyp 05/17/2016     Priority: Medium    Complete AV block, acquired (H) 06/29/2015     Priority: Medium     Status post dual-chamber pacemaker 2/19/2015  Upgrade to CRT D 6/29/2015        NSVT (nonsustained ventricular tachycardia) (H) 06/05/2015     Priority: Medium    Normochromic normocytic anemia 04/21/2015     Priority: Medium    CAD (coronary artery disease) 02/11/2015     Priority: Medium    Heart block AV complete (H) 02/07/2015     Priority: Medium    Right ventricular dysfunction 02/07/2015     Priority: Medium    Bradycardia 12/19/2014     Priority: Medium    Skin  Neoplasm Of Uncertain Behavior      Priority: Medium     Created by Conversion        Hypercholesterolemia      Priority: Medium     Created by Conversion        Obstructive Sleep Apnea      Priority: Medium     Created by Conversion  [facility] Annotation: Sep 25 2012  2:27PM - Adams Garcia: nasal CPAP   at 7   cm H2O        Hypogonadism      Priority: Medium     Created by Conversion        Benign Tubular Adenoma Of The Large Intestine      Priority: Medium     Created by Conversion        Tachycardia      Priority: Medium     Created by Conversion        Impaired Fasting Glucose      Priority: Medium     Created by Conversion  [facility] Annotation: Feb 26 2010  8:45AM - Adams Garcia: 115        Male Erectile Disorder      Priority: Medium     Created by Conversion        Insomnia      Priority: Medium     Created by Conversion        Esophageal Reflux      Priority: Medium     Created by Conversion        Changed Sexual Interest (Libido): Decreased      Priority: Medium     Created by Conversion        Dermatitis      Priority: Medium     Created by Conversion        Hoarseness      Priority: Medium     Created by Conversion          Past Medical History:   Diagnosis Date    Cholelithiasis     Coronary artery disease     Dermatitis     GERD (gastroesophageal reflux disease)     H/O cardiac arrest 2/2015    alka/torsades    Hyperlipidemia     Hypertension     Hypogonadism male     Male erectile disorder     Obesity     Sleep apnea     CPAP    Ventricular tachycardia (H)     seen on pacer interrogation     Past Surgical History:   Procedure Laterality Date    CARDIAC CATHETERIZATION      COLONOSCOPY N/A 9/30/2015    Procedure: COLONOSCOPY;  Surgeon: Rafiq Gu MD;  Location: [facility];  Service:     HC REMOVE TONSILS/ADENOIDS,<13 Y/O      Description: Tonsillectomy With Adenoidectomy;  Recorded: 02/22/2008;    HERNIA REPAIR      inguinal     INSERT / REPLACE / REMOVE PACEMAKER      LAPAROSCOPIC  CHOLECYSTECTOMY N/A 10/2/2019    Procedure: CHOLECYSTECTOMY, LAPAROSCOPIC;  Surgeon: Shin Baird MD;  Location: [facility];  Service: General    OTHER SURGICAL HISTORY  2010    polypremoved during colonoscopy    MO ERCP DX COLLECTION SPECIMEN BRUSHING/WASHING N/A 5/10/2020    Procedure: ENDOSCOPIC RETROGRADE CHOLANGIOPANCREATOGRAPHY;  Surgeon: Simon Loaiza MD;  Location: [facility];  Service: Gastroenterology    REMOVAL OF SPERM DUCT(S)      Description: Surgery Of Male Genitalia Vasectomy;  Recorded: 02/22/2008;    TONSILLECTOMY      TUMOR REMOVAL  2008    right vocal cord -     XR ERCP BILIARY ONLY  5/10/2020     Current Outpatient Medications   Medication Sig Dispense Refill    aspirin 81 MG EC tablet [ASPIRIN 81 MG EC TABLET] Take 81 mg by mouth daily.      calcium carbonate (CALCIUM CARBONATE) 300 mg (750 mg) Chew [CALCIUM CARBONATE (CALCIUM CARBONATE) 300 MG (750 MG) CHEW] Chew 2-3 tablets as needed (heartburn).             coenzyme Q10 (CO Q-10) 200 mg capsule [COENZYME Q10 (CO Q-10) 200 MG CAPSULE] Take 200 mg by mouth daily.       fish oil-omega-3 fatty acids (FISH OIL) 300-1,000 mg capsule [FISH OIL-OMEGA-3 FATTY ACIDS (FISH OIL) 300-1,000 MG CAPSULE] Take 1 g by mouth daily.      latanoprost (XALATAN) 0.005 % ophthalmic solution [LATANOPROST (XALATAN) 0.005 % OPHTHALMIC SOLUTION] Administer 1 drop to both eyes at bedtime.             lisinopril (ZESTRIL) 5 MG tablet Take 2 tablets (10 mg) by mouth daily 180 tablet 3    metoprolol succinate ER (TOPROL XL) 100 MG 24 hr tablet TAKE ONE TABLET BY MOUTH ONCE DAILY AT BEDTIME 90 tablet 0    metoprolol succinate ER (TOPROL XL) 50 MG 24 hr tablet Take 2 tablets (100 mg) by mouth at bedtime 270 tablet 3    multivitamin therapeutic tablet [MULTIVITAMIN THERAPEUTIC TABLET] Take 1 tablet by mouth daily.      nitroglycerin (NITROSTAT) 0.4 MG SL tablet [NITROGLYCERIN (NITROSTAT) 0.4 MG SL TABLET] Place 1 tablet (0.4 mg total) under the tongue  "every 5 (five) minutes as needed for chest pain. 100 tablet 3    omeprazole (PRILOSEC) 20 MG DR capsule TAKE 1 CAPSULE AS NEEDED 90 capsule 1    oxymetazoline (AFRIN) 0.05 % nasal spray [OXYMETAZOLINE (AFRIN) 0.05 % NASAL SPRAY] Apply 2 sprays into each nostril daily as needed for congestion.      psyllium (METAMUCIL) 3.4 gram packet [PSYLLIUM (METAMUCIL) 3.4 GRAM PACKET] Take 1 packet by mouth 2 (two) times a day.      sildenafil (VIAGRA) 50 MG tablet Take 1 tablet (50 mg) by mouth as needed 30 tablet 11    simvastatin (ZOCOR) 40 MG tablet TAKE ONE TABLET BY MOUTH ONCE DAILY AT BEDTIME. SCHEDULE YEARLY FOLLOW UP FOR FUTURE REFILLS 90 tablet 3       Allergies   Allergen Reactions    Zithromax [Azithromycin] Rash        Social History     Tobacco Use    Smoking status: Former     Current packs/day: 0.00     Types: Cigarettes     Quit date: 2008     Years since quittin.5    Smokeless tobacco: Never   Substance Use Topics    Alcohol use: No     Family History   Problem Relation Age of Onset    Pneumonia Mother     Hypertension Mother     Cancer Father     Chronic Obstructive Pulmonary Disease Sister     Multiple Sclerosis Sister     Acute Myocardial Infarction No family hx of      History   Drug Use No             Review of Systems  Constitutional, HEENT, cardiovascular, pulmonary, GI, , musculoskeletal, neuro, skin, endocrine and psych systems are negative, except as otherwise noted.    Objective    /70   Pulse 75   Temp 97.7  F (36.5  C)   Resp 15   Ht 1.778 m (5' 10\")   Wt 104.3 kg (230 lb)   SpO2 97%   BMI 33.00 kg/m     Estimated body mass index is 33 kg/m  as calculated from the following:    Height as of this encounter: 1.778 m (5' 10\").    Weight as of this encounter: 104.3 kg (230 lb).  Physical Exam  GENERAL: alert and no distress  EYES: Eyes grossly normal to inspection, PERRL and conjunctivae and sclerae normal  HENT: ear canals and TM's normal, nose and mouth without ulcers or " lesions  NECK: no adenopathy, no asymmetry, masses, or scars  RESP: lungs clear to auscultation - no rales, rhonchi or wheezes  CV: regular rate and rhythm, normal S1 S2, no S3 or S4, no murmur, click or rub, no peripheral edema  ABDOMEN: soft, nontender, no hepatosplenomegaly, no masses and bowel sounds normal  MS: no gross musculoskeletal defects noted, no edema  SKIN: no suspicious lesions or rashes  NEURO: Normal strength and tone, mentation intact and speech normal  PSYCH: mentation appears normal, affect normal/bright    Recent Labs   Lab Test 06/07/24  1427 12/07/23  1311   HGB 15.8 15.0    152    140   POTASSIUM 4.3 3.9   CR 1.09 1.19*   A1C  --  5.6        Diagnostics  Labs pending at this time.  Results will be reviewed when available.  No results found for this or any previous visit (from the past 24 hours).   EKG required for significant arrhythmia and not completed in the last 90 days.       6/27/23  Complete Echo Adult. Definity (NDC #22012-901) given intravenously.  ______________________________________________________________________________  Interpretation Summary     The left ventricle is normal in size.  There is normal left ventricular wall thickness.  The visual ejection fraction is 45-50%.  Grade II or moderate diastolic dysfunction.  Septal wall motion abnormality may reflect pacemaker activation.  There is mild global hypokinesia of the left ventricle.  Normal right ventricle size and systolic function.  There is a pacemaker lead in the right ventricle.  The left atrium is mildly dilated.  No obvious valvular disease.     No previous study for comparison.        Revised Cardiac Risk Index (RCRI)  The patient has the following serious cardiovascular risks for perioperative complications:   - Congestive Heart Failure (pulmonary edema, PND, s3 janes, CXR with pulmonary congestion, basilar rales) = 1 point     RCRI Interpretation: 1 point: Class II (low risk - 0.9% complication  rate)         Signed Electronically by: Adams Garcia MD  A copy of this evaluation report is provided to the requesting physician.

## 2024-12-04 LAB
ANION GAP SERPL CALCULATED.3IONS-SCNC: 12 MMOL/L (ref 7–15)
ATRIAL RATE - MUSE: 69 BPM
BUN SERPL-MCNC: 13.3 MG/DL (ref 8–23)
CALCIUM SERPL-MCNC: 9.7 MG/DL (ref 8.8–10.4)
CHLORIDE SERPL-SCNC: 99 MMOL/L (ref 98–107)
CHOLEST SERPL-MCNC: 144 MG/DL
CREAT SERPL-MCNC: 1.21 MG/DL (ref 0.67–1.17)
DIASTOLIC BLOOD PRESSURE - MUSE: NORMAL MMHG
EGFRCR SERPLBLD CKD-EPI 2021: 61 ML/MIN/1.73M2
FASTING STATUS PATIENT QL REPORTED: ABNORMAL
FASTING STATUS PATIENT QL REPORTED: NORMAL
GLUCOSE SERPL-MCNC: 103 MG/DL (ref 70–99)
HCO3 SERPL-SCNC: 25 MMOL/L (ref 22–29)
HDLC SERPL-MCNC: 51 MG/DL
INTERPRETATION ECG - MUSE: NORMAL
LDLC SERPL CALC-MCNC: 76 MG/DL
NONHDLC SERPL-MCNC: 93 MG/DL
P AXIS - MUSE: 67 DEGREES
POTASSIUM SERPL-SCNC: 4.1 MMOL/L (ref 3.4–5.3)
PR INTERVAL - MUSE: 166 MS
QRS DURATION - MUSE: 148 MS
QT - MUSE: 478 MS
QTC - MUSE: 512 MS
R AXIS - MUSE: 178 DEGREES
SODIUM SERPL-SCNC: 136 MMOL/L (ref 135–145)
SYSTOLIC BLOOD PRESSURE - MUSE: NORMAL MMHG
T AXIS - MUSE: 45 DEGREES
TRIGL SERPL-MCNC: 86 MG/DL
VENTRICULAR RATE- MUSE: 69 BPM

## 2024-12-07 ENCOUNTER — MYC MEDICAL ADVICE (OUTPATIENT)
Dept: FAMILY MEDICINE | Facility: CLINIC | Age: 79
End: 2024-12-07
Payer: COMMERCIAL

## 2024-12-07 DIAGNOSIS — Z63.79 STRESSFUL LIFE EVENTS AFFECTING FAMILY AND HOUSEHOLD: Primary | ICD-10-CM

## 2024-12-17 ENCOUNTER — ANESTHESIA EVENT (OUTPATIENT)
Dept: SURGERY | Facility: CLINIC | Age: 79
End: 2024-12-17
Payer: COMMERCIAL

## 2024-12-17 ENCOUNTER — ANESTHESIA (OUTPATIENT)
Dept: SURGERY | Facility: CLINIC | Age: 79
End: 2024-12-17
Payer: COMMERCIAL

## 2024-12-17 ENCOUNTER — HOSPITAL ENCOUNTER (OUTPATIENT)
Facility: CLINIC | Age: 79
Discharge: HOME | End: 2024-12-17
Attending: INTERNAL MEDICINE | Admitting: INTERNAL MEDICINE
Payer: COMMERCIAL

## 2024-12-17 VITALS
OXYGEN SATURATION: 100 % | RESPIRATION RATE: 18 BRPM | SYSTOLIC BLOOD PRESSURE: 124 MMHG | TEMPERATURE: 97.1 F | HEART RATE: 63 BPM | DIASTOLIC BLOOD PRESSURE: 60 MMHG

## 2024-12-17 LAB — COLONOSCOPY: NORMAL

## 2024-12-17 PROCEDURE — 250N000011 HC RX IP 250 OP 636: Performed by: NURSE ANESTHETIST, CERTIFIED REGISTERED

## 2024-12-17 PROCEDURE — 710N000012 HC RECOVERY PHASE 2, PER MINUTE: Performed by: INTERNAL MEDICINE

## 2024-12-17 PROCEDURE — 370N000017 HC ANESTHESIA TECHNICAL FEE, PER MIN: Performed by: INTERNAL MEDICINE

## 2024-12-17 PROCEDURE — 258N000003 HC RX IP 258 OP 636: Performed by: NURSE ANESTHETIST, CERTIFIED REGISTERED

## 2024-12-17 PROCEDURE — 999N000141 HC STATISTIC PRE-PROCEDURE NURSING ASSESSMENT: Performed by: INTERNAL MEDICINE

## 2024-12-17 PROCEDURE — 250N000009 HC RX 250: Performed by: NURSE ANESTHETIST, CERTIFIED REGISTERED

## 2024-12-17 PROCEDURE — 272N000001 HC OR GENERAL SUPPLY STERILE: Performed by: INTERNAL MEDICINE

## 2024-12-17 PROCEDURE — 360N000075 HC SURGERY LEVEL 2, PER MIN: Performed by: INTERNAL MEDICINE

## 2024-12-17 RX ORDER — FENTANYL CITRATE 50 UG/ML
50 INJECTION, SOLUTION INTRAMUSCULAR; INTRAVENOUS EVERY 5 MIN PRN
Status: DISCONTINUED | OUTPATIENT
Start: 2024-12-17 | End: 2024-12-17 | Stop reason: HOSPADM

## 2024-12-17 RX ORDER — LIDOCAINE HYDROCHLORIDE 10 MG/ML
INJECTION, SOLUTION INFILTRATION; PERINEURAL PRN
Status: DISCONTINUED | OUTPATIENT
Start: 2024-12-17 | End: 2024-12-17

## 2024-12-17 RX ORDER — ONDANSETRON 2 MG/ML
4 INJECTION INTRAMUSCULAR; INTRAVENOUS EVERY 6 HOURS PRN
OUTPATIENT
Start: 2024-12-17

## 2024-12-17 RX ORDER — HYDROMORPHONE HCL IN WATER/PF 6 MG/30 ML
0.4 PATIENT CONTROLLED ANALGESIA SYRINGE INTRAVENOUS EVERY 5 MIN PRN
Status: DISCONTINUED | OUTPATIENT
Start: 2024-12-17 | End: 2024-12-17 | Stop reason: HOSPADM

## 2024-12-17 RX ORDER — PROPOFOL 10 MG/ML
INJECTION, EMULSION INTRAVENOUS CONTINUOUS PRN
Status: DISCONTINUED | OUTPATIENT
Start: 2024-12-17 | End: 2024-12-17

## 2024-12-17 RX ORDER — PROCHLORPERAZINE MALEATE 5 MG/1
5 TABLET ORAL EVERY 6 HOURS PRN
OUTPATIENT
Start: 2024-12-17

## 2024-12-17 RX ORDER — ONDANSETRON 4 MG/1
4 TABLET, ORALLY DISINTEGRATING ORAL EVERY 6 HOURS PRN
OUTPATIENT
Start: 2024-12-17

## 2024-12-17 RX ORDER — HYDROMORPHONE HCL IN WATER/PF 6 MG/30 ML
0.2 PATIENT CONTROLLED ANALGESIA SYRINGE INTRAVENOUS EVERY 5 MIN PRN
Status: DISCONTINUED | OUTPATIENT
Start: 2024-12-17 | End: 2024-12-17 | Stop reason: HOSPADM

## 2024-12-17 RX ORDER — SODIUM CHLORIDE, SODIUM LACTATE, POTASSIUM CHLORIDE, CALCIUM CHLORIDE 600; 310; 30; 20 MG/100ML; MG/100ML; MG/100ML; MG/100ML
INJECTION, SOLUTION INTRAVENOUS CONTINUOUS PRN
Status: DISCONTINUED | OUTPATIENT
Start: 2024-12-17 | End: 2024-12-17

## 2024-12-17 RX ORDER — OXYCODONE HYDROCHLORIDE 5 MG/1
10 TABLET ORAL
Status: DISCONTINUED | OUTPATIENT
Start: 2024-12-17 | End: 2024-12-17 | Stop reason: HOSPADM

## 2024-12-17 RX ORDER — NALOXONE HYDROCHLORIDE 0.4 MG/ML
0.1 INJECTION, SOLUTION INTRAMUSCULAR; INTRAVENOUS; SUBCUTANEOUS
Status: DISCONTINUED | OUTPATIENT
Start: 2024-12-17 | End: 2024-12-17 | Stop reason: HOSPADM

## 2024-12-17 RX ORDER — DEXAMETHASONE SODIUM PHOSPHATE 10 MG/ML
4 INJECTION, SOLUTION INTRAMUSCULAR; INTRAVENOUS
Status: DISCONTINUED | OUTPATIENT
Start: 2024-12-17 | End: 2024-12-17 | Stop reason: HOSPADM

## 2024-12-17 RX ORDER — ONDANSETRON 2 MG/ML
4 INJECTION INTRAMUSCULAR; INTRAVENOUS EVERY 30 MIN PRN
Status: DISCONTINUED | OUTPATIENT
Start: 2024-12-17 | End: 2024-12-17 | Stop reason: HOSPADM

## 2024-12-17 RX ORDER — OXYCODONE HYDROCHLORIDE 5 MG/1
5 TABLET ORAL
Status: DISCONTINUED | OUTPATIENT
Start: 2024-12-17 | End: 2024-12-17 | Stop reason: HOSPADM

## 2024-12-17 RX ORDER — ONDANSETRON 4 MG/1
4 TABLET, ORALLY DISINTEGRATING ORAL EVERY 30 MIN PRN
Status: DISCONTINUED | OUTPATIENT
Start: 2024-12-17 | End: 2024-12-17 | Stop reason: HOSPADM

## 2024-12-17 RX ORDER — LIDOCAINE 40 MG/G
CREAM TOPICAL
Status: DISCONTINUED | OUTPATIENT
Start: 2024-12-17 | End: 2024-12-17 | Stop reason: HOSPADM

## 2024-12-17 RX ORDER — FENTANYL CITRATE 50 UG/ML
25 INJECTION, SOLUTION INTRAMUSCULAR; INTRAVENOUS EVERY 5 MIN PRN
Status: DISCONTINUED | OUTPATIENT
Start: 2024-12-17 | End: 2024-12-17 | Stop reason: HOSPADM

## 2024-12-17 RX ORDER — FLUMAZENIL 0.1 MG/ML
0.2 INJECTION, SOLUTION INTRAVENOUS
OUTPATIENT
Start: 2024-12-17 | End: 2024-12-17

## 2024-12-17 RX ORDER — PROPOFOL 10 MG/ML
INJECTION, EMULSION INTRAVENOUS PRN
Status: DISCONTINUED | OUTPATIENT
Start: 2024-12-17 | End: 2024-12-17

## 2024-12-17 RX ADMIN — LIDOCAINE HYDROCHLORIDE 10 ML: 10 INJECTION, SOLUTION INFILTRATION; PERINEURAL at 10:08

## 2024-12-17 RX ADMIN — PHENYLEPHRINE HYDROCHLORIDE 100 MCG: 10 INJECTION INTRAVENOUS at 10:08

## 2024-12-17 RX ADMIN — PROPOFOL 150 MCG/KG/MIN: 10 INJECTION, EMULSION INTRAVENOUS at 10:08

## 2024-12-17 RX ADMIN — PROPOFOL 60 MG: 10 INJECTION, EMULSION INTRAVENOUS at 10:08

## 2024-12-17 RX ADMIN — PHENYLEPHRINE HYDROCHLORIDE 100 MCG: 10 INJECTION INTRAVENOUS at 10:12

## 2024-12-17 RX ADMIN — PHENYLEPHRINE HYDROCHLORIDE 100 MCG: 10 INJECTION INTRAVENOUS at 10:21

## 2024-12-17 RX ADMIN — SODIUM CHLORIDE, POTASSIUM CHLORIDE, SODIUM LACTATE AND CALCIUM CHLORIDE: 600; 310; 30; 20 INJECTION, SOLUTION INTRAVENOUS at 10:41

## 2024-12-17 RX ADMIN — PHENYLEPHRINE HYDROCHLORIDE 100 MCG: 10 INJECTION INTRAVENOUS at 10:24

## 2024-12-17 RX ADMIN — PHENYLEPHRINE HYDROCHLORIDE 200 MCG: 10 INJECTION INTRAVENOUS at 10:27

## 2024-12-17 ASSESSMENT — ACTIVITIES OF DAILY LIVING (ADL)
ADLS_ACUITY_SCORE: 41

## 2024-12-17 ASSESSMENT — COPD QUESTIONNAIRES: COPD: 0

## 2024-12-17 ASSESSMENT — ENCOUNTER SYMPTOMS: DYSRHYTHMIAS: 1

## 2024-12-17 NOTE — ANESTHESIA POSTPROCEDURE EVALUATION
Patient: Víctor Calderon    Procedure: Procedure(s):  COLONOSCOPY       Anesthesia Type:  MAC    Note:     Postop Pain Control: Uneventful            Sign Out: Well controlled pain   PONV: No   Neuro/Psych: Uneventful            Sign Out: Acceptable/Baseline neuro status   Airway/Respiratory: Uneventful            Sign Out: Acceptable/Baseline resp. status   CV/Hemodynamics: Uneventful            Sign Out: Acceptable CV status; No obvious hypovolemia; No obvious fluid overload   Other NRE:    DID A NON-ROUTINE EVENT OCCUR? No           Last vitals:  Vitals Value Taken Time   /60 12/17/24 1105   Temp 36.2  C (97.1  F) 12/17/24 1105   Pulse 60 12/17/24 1106   Resp 18 12/17/24 1105   SpO2 100 % 12/17/24 1106   Vitals shown include unfiled device data.    Electronically Signed By: Herb Mehta MD  December 17, 2024  11:56 AM

## 2024-12-17 NOTE — ANESTHESIA PREPROCEDURE EVALUATION
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    Cholelithiasis     Coronary artery disease     Depressive disorder     Dermatitis     GERD (gastroesophageal reflux disease)     H/O cardiac arrest 2015    alka/torsades    Hyperlipidemia     Hypertension     Hypogonadism male     Male erectile disorder     Obesity     Sleep apnea     CPAP    Ventricular tachycardia (H)     seen on pacer interrogation      Past Surgical History:   Procedure Laterality Date    CARDIAC CATHETERIZATION      COLONOSCOPY N/A 2015    Procedure: COLONOSCOPY;  Surgeon: Rafiq Gu MD;  Location: [facility];  Service:     HC REMOVE TONSILS/ADENOIDS,<13 Y/O      Description: Tonsillectomy With Adenoidectomy;  Recorded: 2008;    HERNIA REPAIR      inguinal     INSERT / REPLACE / REMOVE PACEMAKER      LAPAROSCOPIC CHOLECYSTECTOMY N/A 10/2/2019    Procedure: CHOLECYSTECTOMY, LAPAROSCOPIC;  Surgeon: Shin Baird MD;  Location: [facility];  Service: General    OTHER SURGICAL HISTORY  2010    polypremoved during colonoscopy    [place] ERCP DX COLLECTION SPECIMEN BRUSHING/WASHING N/A 5/10/2020    Procedure: ENDOSCOPIC RETROGRADE CHOLANGIOPANCREATOGRAPHY;  Surgeon: Simon Loaiza MD;  Location: [facility];  Service: Gastroenterology    REMOVAL OF SPERM DUCT(S)      Description: Surgery Of Male Genitalia Vasectomy;  Recorded: 2008;    TONSILLECTOMY      TUMOR REMOVAL      right vocal cord -     XR ERCP BILIARY ONLY  5/10/2020      Allergies   Allergen Reactions    Zithromax [Azithromycin] Rash      Social History     Tobacco Use    Smoking status: Former     Current packs/day: 0.00     Types: Cigarettes     Quit date: 2008     Years since quittin.5    Smokeless tobacco: Never   Substance Use Topics     Alcohol use: No      Wt Readings from Last 1 Encounters:   12/03/24 104.3 kg (230 lb)        Anesthesia Evaluation        No history of anesthetic complications       ROS/MED HX  ENT/Pulmonary:     (+) sleep apnea, uses CPAP,                                   (-) asthma and COPD   Neurologic:  - neg neurologic ROS     Cardiovascular: Comment: Echo (2023):  The left ventricle is normal in size.  There is normal left ventricular wall thickness.  The visual ejection fraction is 45-50%.  Grade II or moderate diastolic dysfunction.  Septal wall motion abnormality may reflect pacemaker activation.  There is mild global hypokinesia of the left ventricle.  Normal right ventricle size and systolic function.  There is a pacemaker lead in the right ventricle.  The left atrium is mildly dilated.  No obvious valvular disease.    (+) Dyslipidemia hypertension- -  CAD -  - -              pacemaker,     ICD     dysrhythmias (VT), Other, a-fib and 3rd Deg Heart Block,             METS/Exercise Tolerance: >4 METS    Hematologic:  - neg hematologic  ROS   (+)      anemia,          Musculoskeletal:  - neg musculoskeletal ROS     GI/Hepatic:  - neg GI/hepatic ROS   (+) GERD,                   Renal/Genitourinary:  - neg Renal ROS     Endo:  - neg endo ROS   (+)               Obesity (BMI 33),       Psychiatric/Substance Use:  - neg psychiatric ROS     Infectious Disease:  - neg infectious disease ROS     Malignancy: Comment: 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  (+) Malignancy,     Other:  - neg other ROS          Physical Exam    Airway  airway exam normal      Mallampati: II   TM distance: > 3 FB   Neck ROM: full   Mouth opening: > 3 cm    Respiratory Devices and Support         Dental       (+) Completely normal teeth      Cardiovascular   cardiovascular exam normal          Pulmonary   pulmonary exam normal                OUTSIDE LABS:  CBC:   Lab Results   Component Value Date    WBC 7.7 06/07/2024    WBC 6.0 12/07/2023    HGB 15.8 06/07/2024  "   HGB 15.0 12/07/2023    HCT 47.4 06/07/2024    HCT 43.9 12/07/2023     06/07/2024     12/07/2023     BMP:   Lab Results   Component Value Date     12/03/2024     06/07/2024    POTASSIUM 4.1 12/03/2024    POTASSIUM 4.3 06/07/2024    CHLORIDE 99 12/03/2024    CHLORIDE 104 06/07/2024    CO2 25 12/03/2024    CO2 22 06/07/2024    BUN 13.3 12/03/2024    BUN 16.5 06/07/2024    CR 1.21 (H) 12/03/2024    CR 1.09 06/07/2024     (H) 12/03/2024     (H) 06/07/2024     COAGS:   Lab Results   Component Value Date    INR 0.96 11/08/2019     POC: No results found for: \"BGM\", \"HCG\", \"HCGS\"  HEPATIC:   Lab Results   Component Value Date    ALBUMIN 4.5 06/07/2024    PROTTOTAL 7.1 06/07/2024    ALT 25 06/07/2024    AST 20 06/07/2024    GGT 52 (H) 12/12/2019    ALKPHOS 66 06/07/2024    BILITOTAL 0.6 06/07/2024    YUE 34 11/08/2019     OTHER:   Lab Results   Component Value Date    A1C 5.7 (H) 12/03/2024    GAL 9.7 12/03/2024    MAG 2.1 05/10/2020    LIPASE <9 05/11/2020    TSH 2.46 06/07/2023       Anesthesia Plan    ASA Status:  3       Anesthesia Type: MAC.   Induction: Intravenous.           Consents    Anesthesia Plan(s) and associated risks, benefits, and realistic alternatives discussed. Questions answered and patient/representative(s) expressed understanding.     - Discussed:     - Discussed with:  Patient            Postoperative Care    Pain management: IV analgesics, Oral pain medications.   PONV prophylaxis: Ondansetron (or other 5HT-3)     Comments:               Herb Mehta MD    I have reviewed the pertinent notes and labs in the chart from the past 30 days and (re)examined the patient.  Any updates or changes from those notes are reflected in this note.             # Drug Induced Platelet Defect: home medication list includes an antiplatelet medication   # Hypertension: Noted on problem list           # Obesity: Estimated body mass index is 33 kg/m  as calculated from the " "following:    Height as of 12/3/24: 1.778 m (5' 10\").    Weight as of 12/3/24: 104.3 kg (230 lb).        # ICD device present      "

## 2024-12-17 NOTE — ANESTHESIA CARE TRANSFER NOTE
Patient: Víctor Calderon    Procedure: Procedure(s):  COLONOSCOPY       Diagnosis: Tubular adenoma of colon [D12.6]  Screening for colon cancer [Z12.11]  Diagnosis Additional Information: No value filed.    Anesthesia Type:   MAC     Note:    Oropharynx: oropharynx clear of all foreign objects  Level of Consciousness: awake  Oxygen Supplementation: face mask  Level of Supplemental Oxygen (L/min / FiO2): 6  Independent Airway: airway patency satisfactory and stable  Dentition: dentition unchanged  Vital Signs Stable: post-procedure vital signs reviewed and stable  Report to RN Given: handoff report given  Patient transferred to: Phase II  Comments: Pt bp low, 500cc LR bag hung, MD notified.  Handoff Report: Identifed the Patient, Identified the Reponsible Provider, Reviewed the pertinent medical history, Discussed the surgical course, Reviewed Intra-OP anesthesia mangement and issues during anesthesia, Set expectations for post-procedure period and Allowed opportunity for questions and acknowledgement of understanding      Vitals:  Vitals Value Taken Time   BP 85/49 12/17/24 1038   Temp 36.4  C (97.6  F) 12/17/24 1036   Pulse 77 12/17/24 1040   Resp 16 12/17/24 1036   SpO2 95 % 12/17/24 1040   Vitals shown include unfiled device data.    Electronically Signed By: MARITZA Bingham CRNA  December 17, 2024  10:41 AM

## 2024-12-17 NOTE — H&P
GENERAL PRE-PROCEDURE:   Procedure:  Colonoscopy  Date/Time:  12/17/2024 9:53 AM    Verbal consent obtained?: Yes    Written consent obtained?: Yes    Risks and benefits: Risks, benefits and alternatives were discussed    Consent given by:  Patient  Patient states understanding of procedure being performed: Yes    Patient's understanding of procedure matches consent: Yes    Procedure consent matches procedure scheduled: Yes    Expected level of sedation:  Deep  Appropriately NPO:  Yes  ASA Class:  3  Mallampati  :  Grade 2- soft palate, base of uvula, tonsillar pillars, and portion of posterior pharyngeal wall visible  Lungs:  Lungs clear with good breath sounds bilaterally  Heart:  Normal heart sounds and rate  History & Physical reviewed:  History and physical reviewed and no updates needed  Statement of review:  I have reviewed the lab findings, diagnostic data, medications, and the plan for sedation

## 2024-12-18 ENCOUNTER — MYC MEDICAL ADVICE (OUTPATIENT)
Dept: FAMILY MEDICINE | Facility: CLINIC | Age: 79
End: 2024-12-18
Payer: COMMERCIAL

## 2024-12-18 DIAGNOSIS — G47.00 INSOMNIA, UNSPECIFIED TYPE: ICD-10-CM

## 2024-12-18 RX ORDER — TRAZODONE HYDROCHLORIDE 50 MG/1
50 TABLET, FILM COATED ORAL
Qty: 90 TABLET | Refills: 3 | Status: SHIPPED | OUTPATIENT
Start: 2024-12-18

## 2025-02-03 ENCOUNTER — ANCILLARY PROCEDURE (OUTPATIENT)
Dept: CARDIOLOGY | Facility: CLINIC | Age: 80
End: 2025-02-03
Attending: INTERNAL MEDICINE
Payer: COMMERCIAL

## 2025-02-03 DIAGNOSIS — I50.9 CONGESTIVE HEART FAILURE (CHF) (H): ICD-10-CM

## 2025-02-03 DIAGNOSIS — Z95.810 ICD (IMPLANTABLE CARDIOVERTER-DEFIBRILLATOR), BIVENTRICULAR, IN SITU: ICD-10-CM

## 2025-02-03 DIAGNOSIS — I44.2 THIRD DEGREE AV BLOCK (H): ICD-10-CM

## 2025-02-03 LAB
MDC_IDC_EPISODE_DTM: NORMAL
MDC_IDC_EPISODE_DURATION: 2 S
MDC_IDC_EPISODE_ID: NORMAL
MDC_IDC_EPISODE_TYPE: NORMAL
MDC_IDC_LEAD_CONNECTION_STATUS: NORMAL
MDC_IDC_LEAD_IMPLANT_DT: NORMAL
MDC_IDC_LEAD_LOCATION: NORMAL
MDC_IDC_LEAD_LOCATION_DETAIL_1: NORMAL
MDC_IDC_LEAD_MFG: NORMAL
MDC_IDC_LEAD_MODEL: NORMAL
MDC_IDC_LEAD_POLARITY_TYPE: NORMAL
MDC_IDC_LEAD_SERIAL: NORMAL
MDC_IDC_LEAD_SPECIAL_FUNCTION: NORMAL
MDC_IDC_MSMT_BATTERY_DTM: NORMAL
MDC_IDC_MSMT_BATTERY_REMAINING_LONGEVITY: 18 MO
MDC_IDC_MSMT_BATTERY_REMAINING_PERCENTAGE: 25 %
MDC_IDC_MSMT_BATTERY_STATUS: NORMAL
MDC_IDC_MSMT_CAP_CHARGE_DTM: NORMAL
MDC_IDC_MSMT_CAP_CHARGE_DTM: NORMAL
MDC_IDC_MSMT_CAP_CHARGE_ENERGY: 41 J
MDC_IDC_MSMT_CAP_CHARGE_TIME: 14.4 S
MDC_IDC_MSMT_CAP_CHARGE_TIME: 8.8 S
MDC_IDC_MSMT_CAP_CHARGE_TYPE: NORMAL
MDC_IDC_MSMT_CAP_CHARGE_TYPE: NORMAL
MDC_IDC_MSMT_LEADCHNL_LV_IMPEDANCE_VALUE: 820 OHM
MDC_IDC_MSMT_LEADCHNL_LV_PACING_THRESHOLD_AMPLITUDE: 0.8 V
MDC_IDC_MSMT_LEADCHNL_LV_PACING_THRESHOLD_PULSEWIDTH: 0.5 MS
MDC_IDC_MSMT_LEADCHNL_RA_IMPEDANCE_VALUE: 389 OHM
MDC_IDC_MSMT_LEADCHNL_RV_IMPEDANCE_VALUE: 403 OHM
MDC_IDC_PG_IMPLANT_DTM: NORMAL
MDC_IDC_PG_MFG: NORMAL
MDC_IDC_PG_MODEL: NORMAL
MDC_IDC_PG_SERIAL: NORMAL
MDC_IDC_PG_TYPE: NORMAL
MDC_IDC_SESS_CLINIC_NAME: NORMAL
MDC_IDC_SESS_DTM: NORMAL
MDC_IDC_SESS_TYPE: NORMAL
MDC_IDC_SET_BRADY_AT_MODE_SWITCH_MODE: NORMAL
MDC_IDC_SET_BRADY_AT_MODE_SWITCH_RATE: 170 {BEATS}/MIN
MDC_IDC_SET_BRADY_LOWRATE: 60 {BEATS}/MIN
MDC_IDC_SET_BRADY_MAX_SENSOR_RATE: 130 {BEATS}/MIN
MDC_IDC_SET_BRADY_MAX_TRACKING_RATE: 140 {BEATS}/MIN
MDC_IDC_SET_BRADY_MODE: NORMAL
MDC_IDC_SET_BRADY_PAV_DELAY_LOW: 180 MS
MDC_IDC_SET_BRADY_SAV_DELAY_LOW: 150 MS
MDC_IDC_SET_CRT_LVRV_DELAY: 0 MS
MDC_IDC_SET_CRT_PACED_CHAMBERS: NORMAL
MDC_IDC_SET_LEADCHNL_LV_PACING_AMPLITUDE: 2 V
MDC_IDC_SET_LEADCHNL_LV_PACING_ANODE_ELECTRODE_1: NORMAL
MDC_IDC_SET_LEADCHNL_LV_PACING_ANODE_LOCATION_1: NORMAL
MDC_IDC_SET_LEADCHNL_LV_PACING_CATHODE_ELECTRODE_1: NORMAL
MDC_IDC_SET_LEADCHNL_LV_PACING_CATHODE_LOCATION_1: NORMAL
MDC_IDC_SET_LEADCHNL_LV_PACING_PULSEWIDTH: 0.5 MS
MDC_IDC_SET_LEADCHNL_LV_SENSING_ADAPTATION_MODE: NORMAL
MDC_IDC_SET_LEADCHNL_LV_SENSING_ANODE_ELECTRODE_1: NORMAL
MDC_IDC_SET_LEADCHNL_LV_SENSING_ANODE_LOCATION_1: NORMAL
MDC_IDC_SET_LEADCHNL_LV_SENSING_CATHODE_ELECTRODE_1: NORMAL
MDC_IDC_SET_LEADCHNL_LV_SENSING_CATHODE_LOCATION_1: NORMAL
MDC_IDC_SET_LEADCHNL_LV_SENSING_SENSITIVITY: 1 MV
MDC_IDC_SET_LEADCHNL_RA_PACING_AMPLITUDE: 1.2 V
MDC_IDC_SET_LEADCHNL_RA_PACING_POLARITY: NORMAL
MDC_IDC_SET_LEADCHNL_RA_PACING_PULSEWIDTH: 0.5 MS
MDC_IDC_SET_LEADCHNL_RA_SENSING_ADAPTATION_MODE: NORMAL
MDC_IDC_SET_LEADCHNL_RA_SENSING_POLARITY: NORMAL
MDC_IDC_SET_LEADCHNL_RA_SENSING_SENSITIVITY: 0.25 MV
MDC_IDC_SET_LEADCHNL_RV_PACING_AMPLITUDE: 2 V
MDC_IDC_SET_LEADCHNL_RV_PACING_POLARITY: NORMAL
MDC_IDC_SET_LEADCHNL_RV_PACING_PULSEWIDTH: 0.5 MS
MDC_IDC_SET_LEADCHNL_RV_SENSING_ADAPTATION_MODE: NORMAL
MDC_IDC_SET_LEADCHNL_RV_SENSING_POLARITY: NORMAL
MDC_IDC_SET_LEADCHNL_RV_SENSING_SENSITIVITY: 0.6 MV
MDC_IDC_SET_ZONE_DETECTION_INTERVAL: 300 MS
MDC_IDC_SET_ZONE_DETECTION_INTERVAL: 353 MS
MDC_IDC_SET_ZONE_STATUS: NORMAL
MDC_IDC_SET_ZONE_STATUS: NORMAL
MDC_IDC_SET_ZONE_TYPE: NORMAL
MDC_IDC_SET_ZONE_TYPE: NORMAL
MDC_IDC_SET_ZONE_VENDOR_TYPE: NORMAL
MDC_IDC_SET_ZONE_VENDOR_TYPE: NORMAL
MDC_IDC_STAT_AT_BURDEN_PERCENT: 1 %
MDC_IDC_STAT_AT_DTM_END: NORMAL
MDC_IDC_STAT_AT_DTM_START: NORMAL
MDC_IDC_STAT_BRADY_DTM_END: NORMAL
MDC_IDC_STAT_BRADY_DTM_START: NORMAL
MDC_IDC_STAT_BRADY_RA_PERCENT_PACED: 12 %
MDC_IDC_STAT_BRADY_RV_PERCENT_PACED: 96 %
MDC_IDC_STAT_CRT_DTM_END: NORMAL
MDC_IDC_STAT_CRT_DTM_START: NORMAL
MDC_IDC_STAT_CRT_LV_PERCENT_PACED: 96 %
MDC_IDC_STAT_EPISODE_RECENT_COUNT: 0
MDC_IDC_STAT_EPISODE_RECENT_COUNT: 1
MDC_IDC_STAT_EPISODE_RECENT_COUNT_DTM_END: NORMAL
MDC_IDC_STAT_EPISODE_RECENT_COUNT_DTM_START: NORMAL
MDC_IDC_STAT_EPISODE_TYPE: NORMAL
MDC_IDC_STAT_EPISODE_VENDOR_TYPE: NORMAL
MDC_IDC_STAT_TACHYTHERAPY_ATP_DELIVERED_RECENT: 0
MDC_IDC_STAT_TACHYTHERAPY_ATP_DELIVERED_TOTAL: 1
MDC_IDC_STAT_TACHYTHERAPY_RECENT_DTM_END: NORMAL
MDC_IDC_STAT_TACHYTHERAPY_RECENT_DTM_START: NORMAL
MDC_IDC_STAT_TACHYTHERAPY_SHOCKS_ABORTED_RECENT: 0
MDC_IDC_STAT_TACHYTHERAPY_SHOCKS_ABORTED_TOTAL: 1
MDC_IDC_STAT_TACHYTHERAPY_SHOCKS_DELIVERED_RECENT: 0
MDC_IDC_STAT_TACHYTHERAPY_SHOCKS_DELIVERED_TOTAL: 2
MDC_IDC_STAT_TACHYTHERAPY_TOTAL_DTM_END: NORMAL
MDC_IDC_STAT_TACHYTHERAPY_TOTAL_DTM_START: NORMAL

## 2025-02-03 PROCEDURE — 93296 REM INTERROG EVL PM/IDS: CPT | Performed by: INTERNAL MEDICINE

## 2025-02-03 PROCEDURE — 93295 DEV INTERROG REMOTE 1/2/MLT: CPT | Performed by: INTERNAL MEDICINE

## 2025-04-05 DIAGNOSIS — Z95.810 ICD (IMPLANTABLE CARDIOVERTER-DEFIBRILLATOR), BIVENTRICULAR, IN SITU: ICD-10-CM

## 2025-04-05 DIAGNOSIS — I50.30 HEART FAILURE WITH PRESERVED EJECTION FRACTION, NYHA CLASS II (H): ICD-10-CM

## 2025-04-05 DIAGNOSIS — E78.5 HYPERLIPIDEMIA LDL GOAL <70: ICD-10-CM

## 2025-04-05 DIAGNOSIS — I44.2 THIRD DEGREE AV BLOCK (H): ICD-10-CM

## 2025-04-07 RX ORDER — METOPROLOL SUCCINATE 100 MG/1
100 TABLET, EXTENDED RELEASE ORAL AT BEDTIME
Qty: 90 TABLET | Refills: 0 | Status: SHIPPED | OUTPATIENT
Start: 2025-04-07

## 2025-04-11 ENCOUNTER — MYC MEDICAL ADVICE (OUTPATIENT)
Dept: FAMILY MEDICINE | Facility: CLINIC | Age: 80
End: 2025-04-11
Payer: COMMERCIAL

## 2025-04-15 NOTE — TELEPHONE ENCOUNTER
Preferred pharmacy updated, sticky note made for acute/urgent medications preferred pharmacy.    Lindsey Choi, MAGGIEN, RN  [facility]

## 2025-04-15 NOTE — TELEPHONE ENCOUNTER
Did he need them sent currently to [facility] or just in the future?  Was the pharmacy changed in the computer in order to list this pharmacy as his preference?      Thank you for clarifying.

## 2025-05-13 ENCOUNTER — ANCILLARY PROCEDURE (OUTPATIENT)
Dept: CARDIOLOGY | Facility: CLINIC | Age: 80
End: 2025-05-13
Attending: INTERNAL MEDICINE
Payer: COMMERCIAL

## 2025-05-13 DIAGNOSIS — Z95.810 ICD (IMPLANTABLE CARDIOVERTER-DEFIBRILLATOR), BIVENTRICULAR, IN SITU: ICD-10-CM

## 2025-05-13 DIAGNOSIS — I44.2 THIRD DEGREE AV BLOCK (H): ICD-10-CM

## 2025-05-13 DIAGNOSIS — I50.9 CONGESTIVE HEART FAILURE (CHF) (H): ICD-10-CM

## 2025-05-14 LAB
MDC_IDC_EPISODE_DTM: NORMAL
MDC_IDC_EPISODE_ID: NORMAL
MDC_IDC_EPISODE_TYPE: NORMAL
MDC_IDC_LEAD_CONNECTION_STATUS: NORMAL
MDC_IDC_LEAD_IMPLANT_DT: NORMAL
MDC_IDC_LEAD_LOCATION: NORMAL
MDC_IDC_LEAD_LOCATION_DETAIL_1: NORMAL
MDC_IDC_LEAD_MFG: NORMAL
MDC_IDC_LEAD_MODEL: NORMAL
MDC_IDC_LEAD_POLARITY_TYPE: NORMAL
MDC_IDC_LEAD_SERIAL: NORMAL
MDC_IDC_LEAD_SPECIAL_FUNCTION: NORMAL
MDC_IDC_MSMT_BATTERY_DTM: NORMAL
MDC_IDC_MSMT_BATTERY_REMAINING_LONGEVITY: 12 MO
MDC_IDC_MSMT_BATTERY_REMAINING_PERCENTAGE: 17 %
MDC_IDC_MSMT_BATTERY_STATUS: NORMAL
MDC_IDC_MSMT_CAP_CHARGE_DTM: NORMAL
MDC_IDC_MSMT_CAP_CHARGE_DTM: NORMAL
MDC_IDC_MSMT_CAP_CHARGE_ENERGY: 41 J
MDC_IDC_MSMT_CAP_CHARGE_TIME: 14.4 S
MDC_IDC_MSMT_CAP_CHARGE_TIME: 8.8 S
MDC_IDC_MSMT_CAP_CHARGE_TYPE: NORMAL
MDC_IDC_MSMT_CAP_CHARGE_TYPE: NORMAL
MDC_IDC_MSMT_LEADCHNL_LV_IMPEDANCE_VALUE: 707 OHM
MDC_IDC_MSMT_LEADCHNL_LV_PACING_THRESHOLD_AMPLITUDE: 0.8 V
MDC_IDC_MSMT_LEADCHNL_LV_PACING_THRESHOLD_PULSEWIDTH: 0.5 MS
MDC_IDC_MSMT_LEADCHNL_RA_IMPEDANCE_VALUE: 366 OHM
MDC_IDC_MSMT_LEADCHNL_RV_IMPEDANCE_VALUE: 369 OHM
MDC_IDC_PG_IMPLANT_DTM: NORMAL
MDC_IDC_PG_MFG: NORMAL
MDC_IDC_PG_MODEL: NORMAL
MDC_IDC_PG_SERIAL: NORMAL
MDC_IDC_PG_TYPE: NORMAL
MDC_IDC_SESS_CLINIC_NAME: NORMAL
MDC_IDC_SESS_DTM: NORMAL
MDC_IDC_SESS_TYPE: NORMAL
MDC_IDC_SET_BRADY_AT_MODE_SWITCH_MODE: NORMAL
MDC_IDC_SET_BRADY_AT_MODE_SWITCH_RATE: 170 {BEATS}/MIN
MDC_IDC_SET_BRADY_LOWRATE: 60 {BEATS}/MIN
MDC_IDC_SET_BRADY_MAX_SENSOR_RATE: 130 {BEATS}/MIN
MDC_IDC_SET_BRADY_MAX_TRACKING_RATE: 140 {BEATS}/MIN
MDC_IDC_SET_BRADY_MODE: NORMAL
MDC_IDC_SET_BRADY_PAV_DELAY_LOW: 180 MS
MDC_IDC_SET_BRADY_SAV_DELAY_LOW: 150 MS
MDC_IDC_SET_CRT_LVRV_DELAY: 0 MS
MDC_IDC_SET_CRT_PACED_CHAMBERS: NORMAL
MDC_IDC_SET_LEADCHNL_LV_PACING_AMPLITUDE: 2 V
MDC_IDC_SET_LEADCHNL_LV_PACING_ANODE_ELECTRODE_1: NORMAL
MDC_IDC_SET_LEADCHNL_LV_PACING_ANODE_LOCATION_1: NORMAL
MDC_IDC_SET_LEADCHNL_LV_PACING_CATHODE_ELECTRODE_1: NORMAL
MDC_IDC_SET_LEADCHNL_LV_PACING_CATHODE_LOCATION_1: NORMAL
MDC_IDC_SET_LEADCHNL_LV_PACING_PULSEWIDTH: 0.5 MS
MDC_IDC_SET_LEADCHNL_LV_SENSING_ADAPTATION_MODE: NORMAL
MDC_IDC_SET_LEADCHNL_LV_SENSING_ANODE_ELECTRODE_1: NORMAL
MDC_IDC_SET_LEADCHNL_LV_SENSING_ANODE_LOCATION_1: NORMAL
MDC_IDC_SET_LEADCHNL_LV_SENSING_CATHODE_ELECTRODE_1: NORMAL
MDC_IDC_SET_LEADCHNL_LV_SENSING_CATHODE_LOCATION_1: NORMAL
MDC_IDC_SET_LEADCHNL_LV_SENSING_SENSITIVITY: 1 MV
MDC_IDC_SET_LEADCHNL_RA_PACING_AMPLITUDE: 1.2 V
MDC_IDC_SET_LEADCHNL_RA_PACING_POLARITY: NORMAL
MDC_IDC_SET_LEADCHNL_RA_PACING_PULSEWIDTH: 0.5 MS
MDC_IDC_SET_LEADCHNL_RA_SENSING_ADAPTATION_MODE: NORMAL
MDC_IDC_SET_LEADCHNL_RA_SENSING_POLARITY: NORMAL
MDC_IDC_SET_LEADCHNL_RA_SENSING_SENSITIVITY: 0.25 MV
MDC_IDC_SET_LEADCHNL_RV_PACING_AMPLITUDE: 2 V
MDC_IDC_SET_LEADCHNL_RV_PACING_POLARITY: NORMAL
MDC_IDC_SET_LEADCHNL_RV_PACING_PULSEWIDTH: 0.5 MS
MDC_IDC_SET_LEADCHNL_RV_SENSING_ADAPTATION_MODE: NORMAL
MDC_IDC_SET_LEADCHNL_RV_SENSING_POLARITY: NORMAL
MDC_IDC_SET_LEADCHNL_RV_SENSING_SENSITIVITY: 0.6 MV
MDC_IDC_SET_ZONE_DETECTION_INTERVAL: 300 MS
MDC_IDC_SET_ZONE_DETECTION_INTERVAL: 353 MS
MDC_IDC_SET_ZONE_STATUS: NORMAL
MDC_IDC_SET_ZONE_STATUS: NORMAL
MDC_IDC_SET_ZONE_TYPE: NORMAL
MDC_IDC_SET_ZONE_TYPE: NORMAL
MDC_IDC_SET_ZONE_VENDOR_TYPE: NORMAL
MDC_IDC_SET_ZONE_VENDOR_TYPE: NORMAL
MDC_IDC_STAT_AT_BURDEN_PERCENT: 1 %
MDC_IDC_STAT_AT_DTM_END: NORMAL
MDC_IDC_STAT_AT_DTM_START: NORMAL
MDC_IDC_STAT_BRADY_DTM_END: NORMAL
MDC_IDC_STAT_BRADY_DTM_START: NORMAL
MDC_IDC_STAT_BRADY_RA_PERCENT_PACED: 13 %
MDC_IDC_STAT_BRADY_RV_PERCENT_PACED: 94 %
MDC_IDC_STAT_CRT_DTM_END: NORMAL
MDC_IDC_STAT_CRT_DTM_START: NORMAL
MDC_IDC_STAT_CRT_LV_PERCENT_PACED: 94 %
MDC_IDC_STAT_EPISODE_RECENT_COUNT: 0
MDC_IDC_STAT_EPISODE_RECENT_COUNT_DTM_END: NORMAL
MDC_IDC_STAT_EPISODE_RECENT_COUNT_DTM_START: NORMAL
MDC_IDC_STAT_EPISODE_TYPE: NORMAL
MDC_IDC_STAT_EPISODE_VENDOR_TYPE: NORMAL
MDC_IDC_STAT_TACHYTHERAPY_ATP_DELIVERED_RECENT: 0
MDC_IDC_STAT_TACHYTHERAPY_ATP_DELIVERED_TOTAL: 1
MDC_IDC_STAT_TACHYTHERAPY_RECENT_DTM_END: NORMAL
MDC_IDC_STAT_TACHYTHERAPY_RECENT_DTM_START: NORMAL
MDC_IDC_STAT_TACHYTHERAPY_SHOCKS_ABORTED_RECENT: 0
MDC_IDC_STAT_TACHYTHERAPY_SHOCKS_ABORTED_TOTAL: 1
MDC_IDC_STAT_TACHYTHERAPY_SHOCKS_DELIVERED_RECENT: 0
MDC_IDC_STAT_TACHYTHERAPY_SHOCKS_DELIVERED_TOTAL: 2
MDC_IDC_STAT_TACHYTHERAPY_TOTAL_DTM_END: NORMAL
MDC_IDC_STAT_TACHYTHERAPY_TOTAL_DTM_START: NORMAL

## 2025-05-14 PROCEDURE — 93296 REM INTERROG EVL PM/IDS: CPT | Performed by: INTERNAL MEDICINE

## 2025-05-14 PROCEDURE — 93295 DEV INTERROG REMOTE 1/2/MLT: CPT | Performed by: INTERNAL MEDICINE

## 2025-06-10 ENCOUNTER — TELEPHONE (OUTPATIENT)
Dept: FAMILY MEDICINE | Facility: CLINIC | Age: 80
End: 2025-06-10
Payer: COMMERCIAL

## 2025-06-10 DIAGNOSIS — N52.9 ERECTILE DYSFUNCTION, UNSPECIFIED ERECTILE DYSFUNCTION TYPE: ICD-10-CM

## 2025-06-10 NOTE — TELEPHONE ENCOUNTER
Forms/Letter Request    Type of form/letter: Sildenafil refill request.     Do we have the form/letter: Yes: Dr Garcia's inbox    Who is the form from? [facility] pharmacy    Where did/will the form come from? form was faxed in    When is form/letter needed by:     How would you like the form/letter returned: Fax : [phone]    Refill request for [place] pharmacy. Unable to locate in EPIC, will place paper request in DR Garcia's inbox to sign.

## 2025-06-18 ENCOUNTER — PATIENT OUTREACH (OUTPATIENT)
Dept: CARE COORDINATION | Facility: CLINIC | Age: 80
End: 2025-06-18
Payer: COMMERCIAL

## 2025-07-03 DIAGNOSIS — E78.5 HYPERLIPIDEMIA LDL GOAL <70: ICD-10-CM

## 2025-07-03 DIAGNOSIS — Z95.810 ICD (IMPLANTABLE CARDIOVERTER-DEFIBRILLATOR), BIVENTRICULAR, IN SITU: ICD-10-CM

## 2025-07-03 DIAGNOSIS — I50.30 HEART FAILURE WITH PRESERVED EJECTION FRACTION, NYHA CLASS II (H): ICD-10-CM

## 2025-07-03 DIAGNOSIS — I44.2 THIRD DEGREE AV BLOCK (H): ICD-10-CM

## 2025-07-03 RX ORDER — METOPROLOL SUCCINATE 100 MG/1
TABLET, EXTENDED RELEASE ORAL
Qty: 90 TABLET | Refills: 0 | Status: SHIPPED | OUTPATIENT
Start: 2025-07-03

## 2025-07-09 NOTE — PROGRESS NOTES
HEART CARE FOLLOW UP NOTE      [facility]  [phone]      Assessment/Recommendations   Assessment: 80 year old male with bradycardia, cardiomyopathy     Plan:  Bradycardia induced Torsades  Doing well s/p CRT-D, no arrhythmia on last device check      -Routine ICD checks    Cardiomyopathy, nonischemic   EF 45-50% on last echocardiogram, lowest 33% at time of arrest 2015.  No signs or symptoms of CHF      -Continue Toprol 100mg, Lisinopril 10mg      -Recheck rest echocardiogram, if EF still abnormal can add Spironolactone and Jardiance       -Not currently on diuretic, weight 214    CAD  Nonocclusive on angiogram 2015,no signs or symptoms of ischemia       -Continue ASA 81mg, Toprol 100mg       -Change Simvastatin 40mg to Lipitor 40mg     HTN  Well controlled      -Continue Toprol 100mg, Lisinopril 10mg      -Recheck rest echocardiogram, if EF still abnormal can add Spironolactone    Hyperlipidemia   LDL = 76      -Consider changing Simvastatin 40mg to Lipitor 40mg for known CAD     The longitudinal plan of care for the diagnosis(es)/condition(s) as documented were addressed during this visit. Due to the added complexity in care, I will continue to support Chaim in the subsequent management and with ongoing continuity of care.          History of Present Illness/Subjective    Indication for visit:  Víctor Calderon returns for follow up of cardiomyopathy and was last seen on 7/12/2024 by Dr Frost.      HPI: Víctor Calderon is a 80 year old male with a history of bradycardia induced Torsades, nonischemic cardiomyopathy s/p CRT-D, ROMELIA, HTN, hyperlipidemia who returns for follow up.    He reports he feels well, no chest pain, dyspnea, orthopnea, or PND.  At night notices some increased heart rate at times.  Lost 50 pounds on low card diet.    Riding exercise bike without chest pain or dyspnea.    I reviewed notes from PCP prior to this visit.         Physical Examination  Past Cardiac History    Vitals: /68 (BP Location: Right arm, Patient Position: Sitting, Cuff Size: Adult Large)   Pulse 72   Resp 16   Wt 97.1 kg (214 lb)   BMI 30.71 kg/m    BMI= Body mass index is 30.71 kg/m .  Wt Readings from Last 3 Encounters:   07/10/25 97.1 kg (214 lb)   12/03/24 104.3 kg (230 lb)   07/12/24 111.1 kg (245 lb)       General Appearance:   no distress, normal body habitus   ENT/Mouth: membranes moist, no oral lesions or bleeding gums.      EYES:  no scleral icterus, normal conjunctivae   Neck: no carotid bruits or thyromegaly   Chest/Lungs:   lungs are clear to auscultation, no rales or wheezing,  sternal scar, equal chest wall expansion    Cardiovascular:   Regular. Normal first and second heart sounds with no murmurs, rubs, or gallops; the carotid, radial and posterior tibial pulses are intact, Jugular venous pressure normal , no edema bilaterally    Abdomen:  no organomegaly, masses, bruits, or tenderness; bowel sounds are present   Extremities: no cyanosis or clubbing   Skin: no xanthelasma, warm.    Neurologic: normal  bilateral, no tremors           Cardiomyopathy, nonischemic  VT/VF arrest due to bradycardia induced Torsades s/p CRT-D 2015   2:1 AVB: s/p CRT-D 2015  CAD: nonocclusive on angiogram 2015    Most Recent Echocardiogram: 6/27/2023  The left ventricle is normal in size.  There is normal left ventricular wall thickness.  The visual ejection fraction is 45-50%.  Grade II or moderate diastolic dysfunction.  Septal wall motion abnormality may reflect pacemaker activation.  There is mild global hypokinesia of the left ventricle.  Normal right ventricle size and systolic function.  There is a pacemaker lead in the right ventricle.  The left atrium is mildly dilated.  No obvious valvular disease.    Most Recent Stress Test: 6/1/2015  1.  Lexiscan stress nuclear study is abnormal.  2.  No inducible ischemia is identified.  3.  Small area of nontransmural infarction involving the apex of the  left ventricle  as well as the mid to distal inferior and inferoseptal segments.  4.  Reduced left ventricular ejection fraction of 33%    Most Recent Angiogram: 2015  LMCA: Normal.     LAD: Minimal disease.     LCx:       Lesion on Mid CX: 70% stenosis.     RCA:       Lesion on Mid RCA: 50% stenosis.             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    Cholelithiasis     Coronary artery disease     Depressive disorder     Dermatitis     GERD (gastroesophageal reflux disease)     H/O cardiac arrest 2015    alka/torsades    Hyperlipidemia     Hypertension     Hypogonadism male     Male erectile disorder     Obesity     Sleep apnea     CPAP    Ventricular tachycardia (H)     seen on pacer interrogation     Family History   Problem Relation Age of Onset    Pneumonia Mother     Hypertension Mother     Cancer Father     Chronic Obstructive Pulmonary Disease Sister     Multiple Sclerosis Sister     Acute Myocardial Infarction No family hx of         Social History     Socioeconomic History    Marital status:      Spouse name: Not on file    Number of children: Not on file    Years of education: Not on file    Highest education level: Not on file   Occupational History    Not on file   Tobacco Use    Smoking status: Former     Current packs/day: 0.00     Types: Cigarettes     Quit date: 2008     Years since quittin.1    Smokeless tobacco: Never   Substance and Sexual Activity    Alcohol use: No    Drug use: No    Sexual activity: Not on file   Other Topics Concern    Not on file   Social History Narrative     about 8 years ago after 44 years marriage  Now engaged to his fiance whom he found on [email or web address].   Has his own machine shop.     [employer] [employer] 2020     Social Drivers of Health     Financial Resource Strain: Low Risk  (2024)    Financial Resource Strain     Within the past 12 months,  have you or your family members you live with been unable to get utilities (heat, electricity) when it was really needed?: No   Food Insecurity: Low Risk  (6/2/2024)    Food Insecurity     Within the past 12 months, did you worry that your food would run out before you got money to buy more?: No     Within the past 12 months, did the food you bought just not last and you didn t have money to get more?: No   Transportation Needs: Low Risk  (6/2/2024)    Transportation Needs     Within the past 12 months, has lack of transportation kept you from medical appointments, getting your medicines, non-medical meetings or appointments, work, or from getting things that you need?: No   Physical Activity: Insufficiently Active (6/2/2024)    Exercise Vital Sign     Days of Exercise per Week: 1 day     Minutes of Exercise per Session: 20 min   Stress: No Stress Concern Present (6/2/2024)    [employer] [place] [employer] - Occupational Stress Questionnaire     Feeling of Stress : Not at all   Social Connections: Unknown (6/2/2024)    Social Connection and Isolation Panel [NHANES]     Frequency of Communication with Friends and Family: Not on file     Frequency of Social Gatherings with Friends and Family: Never     Attends [employer] Services: Not on file     Active Member of Clubs or Organizations: Not on file     Attends Club or Organization Meetings: Not on file     Marital Status: Not on file   Interpersonal Safety: Low Risk  (6/7/2024)    Interpersonal Safety     Do you feel physically and emotionally safe where you currently live?: Yes     Within the past 12 months, have you been hit, slapped, kicked or otherwise physically hurt by someone?: No     Within the past 12 months, have you been humiliated or emotionally abused in other ways by your partner or ex-partner?: No   Housing Stability: Low Risk  (6/2/2024)    Housing Stability     Do you have housing? : Yes     Are you worried about losing your housing?: No            Medications  Allergies   Current Outpatient Medications   Medication Sig Dispense Refill    aspirin 81 MG EC tablet [ASPIRIN 81 MG EC TABLET] Take 81 mg by mouth daily.      calcium carbonate (CALCIUM CARBONATE) 300 mg (750 mg) Chew [CALCIUM CARBONATE (CALCIUM CARBONATE) 300 MG (750 MG) CHEW] Chew 2-3 tablets as needed (heartburn).             coenzyme Q10 (CO Q-10) 200 mg capsule [COENZYME Q10 (CO Q-10) 200 MG CAPSULE] Take 200 mg by mouth daily.       fish oil-omega-3 fatty acids (FISH OIL) 300-1,000 mg capsule [FISH OIL-OMEGA-3 FATTY ACIDS (FISH OIL) 300-1,000 MG CAPSULE] Take 1 g by mouth daily.      latanoprost (XALATAN) 0.005 % ophthalmic solution [LATANOPROST (XALATAN) 0.005 % OPHTHALMIC SOLUTION] Administer 1 drop to both eyes at bedtime.             lisinopril (ZESTRIL) 5 MG tablet Take 2 tablets (10 mg) by mouth daily. 180 tablet 3    metoprolol succinate ER (TOPROL XL) 100 MG 24 hr tablet TAKE ONE TABLET BY MOUTH ONCE DAILY AT BEDTIME. SCHEDULE YEARLY FOLLOW UP FOR FURTHER REFILLS. 90 tablet 0    metoprolol succinate ER (TOPROL XL) 50 MG 24 hr tablet Take 2 tablets (100 mg) by mouth at bedtime 270 tablet 3    multivitamin therapeutic tablet [MULTIVITAMIN THERAPEUTIC TABLET] Take 1 tablet by mouth daily.      nitroglycerin (NITROSTAT) 0.4 MG SL tablet [NITROGLYCERIN (NITROSTAT) 0.4 MG SL TABLET] Place 1 tablet (0.4 mg total) under the tongue every 5 (five) minutes as needed for chest pain. 100 tablet 3    omeprazole (PRILOSEC) 20 MG DR capsule TAKE 1 CAPSULE AS NEEDED 90 capsule 1    oxymetazoline (AFRIN) 0.05 % nasal spray [OXYMETAZOLINE (AFRIN) 0.05 % NASAL SPRAY] Apply 2 sprays into each nostril daily as needed for congestion.      psyllium (METAMUCIL) 3.4 gram packet [PSYLLIUM (METAMUCIL) 3.4 GRAM PACKET] Take 1 packet by mouth 2 (two) times a day.      sildenafil (VIAGRA) 50 MG tablet Take 1 tablet (50 mg) by mouth as needed 30 tablet 11    simvastatin (ZOCOR) 40 MG tablet TAKE ONE TABLET BY  MOUTH ONCE DAILY AT BEDTIME. SCHEDULE YEARLY FOLLOW UP FOR FUTURE REFILLS 90 tablet 3    traZODone (DESYREL) 50 MG tablet Take 1 tablet (50 mg) by mouth nightly as needed for sleep. [TRAZODONE (DESYREL) 50 MG TABLET] TAKE 1 TABLET AT BEDTIME 90 tablet 3       Allergies   Allergen Reactions    Zithromax [Azithromycin] Rash          Lab Results    Chemistry/lipid CBC Cardiac Enzymes/BNP/TSH/INR   Recent Labs   Lab Test 12/03/24  1242   CHOL 144   HDL 51   LDL 76   TRIG 86     Recent Labs   Lab Test 12/03/24  1242 06/07/24  1427 12/07/23  1311   LDL 76 81 99     Recent Labs   Lab Test 12/03/24  1242      POTASSIUM 4.1   CHLORIDE 99   CO2 25   *   BUN 13.3   CR 1.21*   GFRESTIMATED 61   GAL 9.7     Recent Labs   Lab Test 12/03/24  1242 06/07/24  1427 12/07/23  1311   CR 1.21* 1.09 1.19*     Recent Labs   Lab Test 12/03/24  1242 12/07/23  1311 12/15/21  1201   A1C 5.7* 5.6 5.3          Recent Labs   Lab Test 06/07/24  1427   WBC 7.7   HGB 15.8   HCT 47.4   MCV 93        Recent Labs   Lab Test 06/07/24  1427 12/07/23  1311 06/07/23  1550   HGB 15.8 15.0 15.7    Recent Labs   Lab Test 05/10/20  0443   TROPONINI <0.01     Recent Labs   Lab Test 06/12/18  1220   BNP 30     Recent Labs   Lab Test 06/07/23  1550   TSH 2.46     Recent Labs   Lab Test 11/08/19  1509 10/29/19  1136   INR 0.96 1.47*        Lynn De La O MD  Noninvasive Cardiologist   [facility]

## 2025-07-10 ENCOUNTER — OFFICE VISIT (OUTPATIENT)
Dept: CARDIOLOGY | Facility: CLINIC | Age: 80
End: 2025-07-10
Attending: INTERNAL MEDICINE
Payer: COMMERCIAL

## 2025-07-10 VITALS
HEART RATE: 72 BPM | BODY MASS INDEX: 30.71 KG/M2 | SYSTOLIC BLOOD PRESSURE: 128 MMHG | DIASTOLIC BLOOD PRESSURE: 68 MMHG | WEIGHT: 214 LBS | RESPIRATION RATE: 16 BRPM

## 2025-07-10 DIAGNOSIS — I44.2 THIRD DEGREE AV BLOCK (H): ICD-10-CM

## 2025-07-10 DIAGNOSIS — E78.5 HYPERLIPIDEMIA LDL GOAL <70: ICD-10-CM

## 2025-07-10 DIAGNOSIS — I42.0 DILATED CARDIOMYOPATHY (H): Primary | ICD-10-CM

## 2025-07-10 DIAGNOSIS — I10 PRIMARY HYPERTENSION: ICD-10-CM

## 2025-07-10 DIAGNOSIS — Z95.810 ICD (IMPLANTABLE CARDIOVERTER-DEFIBRILLATOR), BIVENTRICULAR, IN SITU: ICD-10-CM

## 2025-07-10 NOTE — LETTER
7/10/2025    Adams Garcia MD  [address]  [facility] [address]    RE: Víctor Calderon       Dear Colleague,     I had the pleasure of seeing Víctor Calderon in the [facility].    HEART CARE FOLLOW UP NOTE      [facility]  [phone]      Assessment/Recommendations   Assessment: 80 year old male with bradycardia, cardiomyopathy     Plan:  Bradycardia induced Torsades  Doing well s/p CRT-D, no arrhythmia on last device check      -Routine ICD checks    Cardiomyopathy, nonischemic   EF 45-50% on last echocardiogram, lowest 33% at time of arrest 2015.  No signs or symptoms of CHF      -Continue Toprol 100mg, Lisinopril 10mg      -Recheck rest echocardiogram, if EF still abnormal can add Spironolactone and Jardiance       -Not currently on diuretic, weight 214    CAD  Nonocclusive on angiogram 2015,no signs or symptoms of ischemia       -Continue ASA 81mg, Toprol 100mg       -Change Simvastatin 40mg to Lipitor 40mg     HTN  Well controlled      -Continue Toprol 100mg, Lisinopril 10mg      -Recheck rest echocardiogram, if EF still abnormal can add Spironolactone    Hyperlipidemia   LDL = 76      -Consider changing Simvastatin 40mg to Lipitor 40mg for known CAD     The longitudinal plan of care for the diagnosis(es)/condition(s) as documented were addressed during this visit. Due to the added complexity in care, I will continue to support Chaim in the subsequent management and with ongoing continuity of care.          History of Present Illness/Subjective    Indication for visit:  Víctor Calderon returns for follow up of cardiomyopathy and was last seen on 7/12/2024 by Dr Frost.      HPI: Víctor Calderon is a 80 year old male with a history of bradycardia induced Torsades, nonischemic cardiomyopathy s/p CRT-D, ROMELIA, HTN, hyperlipidemia who returns for follow up.    He reports he feels well, no chest pain, dyspnea, orthopnea, or PND.  At night notices some increased  heart rate at times.  Lost 50 pounds on low card diet.    Riding exercise bike without chest pain or dyspnea.    I reviewed notes from PCP prior to this visit.         Physical Examination  Past Cardiac History   Vitals: /68 (BP Location: Right arm, Patient Position: Sitting, Cuff Size: Adult Large)   Pulse 72   Resp 16   Wt 97.1 kg (214 lb)   BMI 30.71 kg/m    BMI= Body mass index is 30.71 kg/m .  Wt Readings from Last 3 Encounters:   07/10/25 97.1 kg (214 lb)   12/03/24 104.3 kg (230 lb)   07/12/24 111.1 kg (245 lb)       General Appearance:   no distress, normal body habitus   ENT/Mouth: membranes moist, no oral lesions or bleeding gums.      EYES:  no scleral icterus, normal conjunctivae   Neck: no carotid bruits or thyromegaly   Chest/Lungs:   lungs are clear to auscultation, no rales or wheezing,  sternal scar, equal chest wall expansion    Cardiovascular:   Regular. Normal first and second heart sounds with no murmurs, rubs, or gallops; the carotid, radial and posterior tibial pulses are intact, Jugular venous pressure normal , no edema bilaterally    Abdomen:  no organomegaly, masses, bruits, or tenderness; bowel sounds are present   Extremities: no cyanosis or clubbing   Skin: no xanthelasma, warm.    Neurologic: normal  bilateral, no tremors           Cardiomyopathy, nonischemic  VT/VF arrest due to bradycardia induced Torsades s/p CRT-D 2015   2:1 AVB: s/p CRT-D 2015  CAD: nonocclusive on angiogram 2015    Most Recent Echocardiogram: 6/27/2023  The left ventricle is normal in size.  There is normal left ventricular wall thickness.  The visual ejection fraction is 45-50%.  Grade II or moderate diastolic dysfunction.  Septal wall motion abnormality may reflect pacemaker activation.  There is mild global hypokinesia of the left ventricle.  Normal right ventricle size and systolic function.  There is a pacemaker lead in the right ventricle.  The left atrium is mildly dilated.  No obvious  valvular disease.    Most Recent Stress Test: 2015  1.  Lexiscan stress nuclear study is abnormal.  2.  No inducible ischemia is identified.  3.  Small area of nontransmural infarction involving the apex of the left ventricle  as well as the mid to distal inferior and inferoseptal segments.  4.  Reduced left ventricular ejection fraction of 33%    Most Recent Angiogram: 2015  LMCA: Normal.     LAD: Minimal disease.     LCx:       Lesion on Mid CX: 70% stenosis.     RCA:       Lesion on Mid RCA: 50% stenosis.             Medical History  Family History Social History   Past Medical History:   Diagnosis Date     Cancer (H)     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     Cholelithiasis      Coronary artery disease      Depressive disorder      Dermatitis      GERD (gastroesophageal reflux disease)      H/O cardiac arrest 2015    alka/torsades     Hyperlipidemia      Hypertension      Hypogonadism male      Male erectile disorder      Obesity      Sleep apnea     CPAP     Ventricular tachycardia (H)     seen on pacer interrogation     Family History   Problem Relation Age of Onset     Pneumonia Mother      Hypertension Mother      Cancer Father      Chronic Obstructive Pulmonary Disease Sister      Multiple Sclerosis Sister      Acute Myocardial Infarction No family hx of         Social History     Socioeconomic History     Marital status:      Spouse name: Not on file     Number of children: Not on file     Years of education: Not on file     Highest education level: Not on file   Occupational History     Not on file   Tobacco Use     Smoking status: Former     Current packs/day: 0.00     Types: Cigarettes     Quit date: 2008     Years since quittin.1     Smokeless tobacco: Never   Substance and Sexual Activity     Alcohol use: No     Drug use: No     Sexual activity: Not on file   Other Topics Concern     Not on file   Social History Narrative     about 8 years ago after 44 years marriage  Now  engaged to his fiance whom he found on [email or web address].   Has his own machine shop.     Nury Shah 2/7/2020     Social Drivers of Health     Financial Resource Strain: Low Risk  (6/2/2024)    Financial Resource Strain      Within the past 12 months, have you or your family members you live with been unable to get utilities (heat, electricity) when it was really needed?: No   Food Insecurity: Low Risk  (6/2/2024)    Food Insecurity      Within the past 12 months, did you worry that your food would run out before you got money to buy more?: No      Within the past 12 months, did the food you bought just not last and you didn t have money to get more?: No   Transportation Needs: Low Risk  (6/2/2024)    Transportation Needs      Within the past 12 months, has lack of transportation kept you from medical appointments, getting your medicines, non-medical meetings or appointments, work, or from getting things that you need?: No   Physical Activity: Insufficiently Active (6/2/2024)    Exercise Vital Sign      Days of Exercise per Week: 1 day      Minutes of Exercise per Session: 20 min   Stress: No Stress Concern Present (6/2/2024)    [employer] - Occupational Stress Questionnaire      Feeling of Stress : Not at all   Social Connections: Unknown (6/2/2024)    Social Connection and Isolation Panel [NHANES]      Frequency of Communication with Friends and Family: Not on file      Frequency of Social Gatherings with Friends and Family: Never      Attends [employer] Services: Not on file      Active Member of Clubs or Organizations: Not on file      Attends Club or Organization Meetings: Not on file      Marital Status: Not on file   Interpersonal Safety: Low Risk  (6/7/2024)    Interpersonal Safety      Do you feel physically and emotionally safe where you currently live?: Yes      Within the past 12 months, have you been hit, slapped, kicked or otherwise physically hurt by someone?: No      Within  the past 12 months, have you been humiliated or emotionally abused in other ways by your partner or ex-partner?: No   Housing Stability: Low Risk  (6/2/2024)    Housing Stability      Do you have housing? : Yes      Are you worried about losing your housing?: No           Medications  Allergies   Current Outpatient Medications   Medication Sig Dispense Refill     aspirin 81 MG EC tablet [ASPIRIN 81 MG EC TABLET] Take 81 mg by mouth daily.       calcium carbonate (CALCIUM CARBONATE) 300 mg (750 mg) Chew [CALCIUM CARBONATE (CALCIUM CARBONATE) 300 MG (750 MG) CHEW] Chew 2-3 tablets as needed (heartburn).              coenzyme Q10 (CO Q-10) 200 mg capsule [COENZYME Q10 (CO Q-10) 200 MG CAPSULE] Take 200 mg by mouth daily.        fish oil-omega-3 fatty acids (FISH OIL) 300-1,000 mg capsule [FISH OIL-OMEGA-3 FATTY ACIDS (FISH OIL) 300-1,000 MG CAPSULE] Take 1 g by mouth daily.       latanoprost (XALATAN) 0.005 % ophthalmic solution [LATANOPROST (XALATAN) 0.005 % OPHTHALMIC SOLUTION] Administer 1 drop to both eyes at bedtime.              lisinopril (ZESTRIL) 5 MG tablet Take 2 tablets (10 mg) by mouth daily. 180 tablet 3     metoprolol succinate ER (TOPROL XL) 100 MG 24 hr tablet TAKE ONE TABLET BY MOUTH ONCE DAILY AT BEDTIME. SCHEDULE YEARLY FOLLOW UP FOR FURTHER REFILLS. 90 tablet 0     metoprolol succinate ER (TOPROL XL) 50 MG 24 hr tablet Take 2 tablets (100 mg) by mouth at bedtime 270 tablet 3     multivitamin therapeutic tablet [MULTIVITAMIN THERAPEUTIC TABLET] Take 1 tablet by mouth daily.       nitroglycerin (NITROSTAT) 0.4 MG SL tablet [NITROGLYCERIN (NITROSTAT) 0.4 MG SL TABLET] Place 1 tablet (0.4 mg total) under the tongue every 5 (five) minutes as needed for chest pain. 100 tablet 3     omeprazole (PRILOSEC) 20 MG DR capsule TAKE 1 CAPSULE AS NEEDED 90 capsule 1     oxymetazoline (AFRIN) 0.05 % nasal spray [OXYMETAZOLINE (AFRIN) 0.05 % NASAL SPRAY] Apply 2 sprays into each nostril daily as needed for  congestion.       psyllium (METAMUCIL) 3.4 gram packet [PSYLLIUM (METAMUCIL) 3.4 GRAM PACKET] Take 1 packet by mouth 2 (two) times a day.       sildenafil (VIAGRA) 50 MG tablet Take 1 tablet (50 mg) by mouth as needed 30 tablet 11     simvastatin (ZOCOR) 40 MG tablet TAKE ONE TABLET BY MOUTH ONCE DAILY AT BEDTIME. SCHEDULE YEARLY FOLLOW UP FOR FUTURE REFILLS 90 tablet 3     traZODone (DESYREL) 50 MG tablet Take 1 tablet (50 mg) by mouth nightly as needed for sleep. [TRAZODONE (DESYREL) 50 MG TABLET] TAKE 1 TABLET AT BEDTIME 90 tablet 3       Allergies   Allergen Reactions     Zithromax [Azithromycin] Rash          Lab Results    Chemistry/lipid CBC Cardiac Enzymes/BNP/TSH/INR   Recent Labs   Lab Test 12/03/24  1242   CHOL 144   HDL 51   LDL 76   TRIG 86     Recent Labs   Lab Test 12/03/24  1242 06/07/24  1427 12/07/23  1311   LDL 76 81 99     Recent Labs   Lab Test 12/03/24  1242      POTASSIUM 4.1   CHLORIDE 99   CO2 25   *   BUN 13.3   CR 1.21*   GFRESTIMATED 61   GAL 9.7     Recent Labs   Lab Test 12/03/24  1242 06/07/24  1427 12/07/23  1311   CR 1.21* 1.09 1.19*     Recent Labs   Lab Test 12/03/24  1242 12/07/23  1311 12/15/21  1201   A1C 5.7* 5.6 5.3          Recent Labs   Lab Test 06/07/24  1427   WBC 7.7   HGB 15.8   HCT 47.4   MCV 93        Recent Labs   Lab Test 06/07/24  1427 12/07/23  1311 06/07/23  1550   HGB 15.8 15.0 15.7    Recent Labs   Lab Test 05/10/20  0443   TROPONINI <0.01     Recent Labs   Lab Test 06/12/18  1220   BNP 30     Recent Labs   Lab Test 06/07/23  1550   TSH 2.46     Recent Labs   Lab Test 11/08/19  1509 10/29/19  1136   INR 0.96 1.47*        Lynn De La O MD  Noninvasive Cardiologist   [facility]                                      Thank you for allowing me to participate in the care of your patient.      Sincerely,     Lynn De La O MD     [facility]  cc:   Suleiman Jain MD  [address]  [place] [address]

## 2025-07-12 SDOH — HEALTH STABILITY: PHYSICAL HEALTH: ON AVERAGE, HOW MANY DAYS PER WEEK DO YOU ENGAGE IN MODERATE TO STRENUOUS EXERCISE (LIKE A BRISK WALK)?: 4 DAYS

## 2025-07-12 SDOH — HEALTH STABILITY: PHYSICAL HEALTH: ON AVERAGE, HOW MANY MINUTES DO YOU ENGAGE IN EXERCISE AT THIS LEVEL?: 30 MIN

## 2025-07-12 ASSESSMENT — SOCIAL DETERMINANTS OF HEALTH (SDOH): HOW OFTEN DO YOU GET TOGETHER WITH FRIENDS OR RELATIVES?: PATIENT DECLINED

## 2025-07-17 ENCOUNTER — OFFICE VISIT (OUTPATIENT)
Dept: FAMILY MEDICINE | Facility: CLINIC | Age: 80
End: 2025-07-17
Payer: COMMERCIAL

## 2025-07-17 VITALS
DIASTOLIC BLOOD PRESSURE: 78 MMHG | TEMPERATURE: 98 F | HEART RATE: 72 BPM | RESPIRATION RATE: 20 BRPM | HEIGHT: 70 IN | SYSTOLIC BLOOD PRESSURE: 134 MMHG | BODY MASS INDEX: 29.92 KG/M2 | WEIGHT: 209 LBS | OXYGEN SATURATION: 97 %

## 2025-07-17 DIAGNOSIS — I42.0 DILATED CARDIOMYOPATHY (H): ICD-10-CM

## 2025-07-17 DIAGNOSIS — E78.00 PURE HYPERCHOLESTEROLEMIA: ICD-10-CM

## 2025-07-17 DIAGNOSIS — R73.01 IMPAIRED FASTING GLUCOSE: ICD-10-CM

## 2025-07-17 DIAGNOSIS — G47.33 OSA ON CPAP: ICD-10-CM

## 2025-07-17 DIAGNOSIS — E66.3 OVERWEIGHT: ICD-10-CM

## 2025-07-17 DIAGNOSIS — G47.00 INSOMNIA, UNSPECIFIED TYPE: ICD-10-CM

## 2025-07-17 DIAGNOSIS — I10 ESSENTIAL HYPERTENSION: ICD-10-CM

## 2025-07-17 DIAGNOSIS — D64.9 NORMOCHROMIC NORMOCYTIC ANEMIA: ICD-10-CM

## 2025-07-17 DIAGNOSIS — Z95.810 ICD (IMPLANTABLE CARDIOVERTER-DEFIBRILLATOR), BIVENTRICULAR, IN SITU: ICD-10-CM

## 2025-07-17 DIAGNOSIS — K21.9 GASTROESOPHAGEAL REFLUX DISEASE WITHOUT ESOPHAGITIS: ICD-10-CM

## 2025-07-17 DIAGNOSIS — Z00.00 ENCOUNTER FOR MEDICARE ANNUAL WELLNESS EXAM: Primary | ICD-10-CM

## 2025-07-17 PROBLEM — R79.89 ABNORMAL LIVER FUNCTION TESTS: Status: ACTIVE | Noted: 2025-07-17

## 2025-07-17 PROBLEM — R49.0 HOARSENESS: Status: ACTIVE | Noted: 2025-07-17

## 2025-07-17 LAB
ERYTHROCYTE [DISTWIDTH] IN BLOOD BY AUTOMATED COUNT: 12.4 % (ref 10–15)
EST. AVERAGE GLUCOSE BLD GHB EST-MCNC: 105 MG/DL
HBA1C MFR BLD: 5.3 % (ref 0–5.6)
HCT VFR BLD AUTO: 45.8 % (ref 40–53)
HGB BLD-MCNC: 15.8 G/DL (ref 13.3–17.7)
MCH RBC QN AUTO: 32.8 PG (ref 26.5–33)
MCHC RBC AUTO-ENTMCNC: 34.5 G/DL (ref 31.5–36.5)
MCV RBC AUTO: 95 FL (ref 78–100)
PLATELET # BLD AUTO: 164 10E3/UL (ref 150–450)
RBC # BLD AUTO: 4.82 10E6/UL (ref 4.4–5.9)
WBC # BLD AUTO: 7 10E3/UL (ref 4–11)

## 2025-07-17 RX ORDER — LISINOPRIL 10 MG/1
10 TABLET ORAL DAILY
Qty: 90 TABLET | Refills: 3 | Status: SHIPPED | OUTPATIENT
Start: 2025-07-17

## 2025-07-17 ASSESSMENT — ACTIVITIES OF DAILY LIVING (ADL)
DRESSING/BATHING_DIFFICULTY: NO
DIFFICULTY_COMMUNICATING: NO
TOILETING_ISSUES: NO
HEARING_DIFFICULTY_OR_DEAF: NO
WEAR_GLASSES_OR_BLIND: YES
FALL_HISTORY_WITHIN_LAST_SIX_MONTHS: NO
DOING_ERRANDS_INDEPENDENTLY_DIFFICULTY: NO
CONCENTRATING,_REMEMBERING_OR_MAKING_DECISIONS_DIFFICULTY: NO
DIFFICULTY_EATING/SWALLOWING: NO
CHANGE_IN_FUNCTIONAL_STATUS_SINCE_ONSET_OF_CURRENT_ILLNESS/INJURY: NO
WALKING_OR_CLIMBING_STAIRS_DIFFICULTY: NO

## 2025-07-17 ASSESSMENT — PAIN SCALES - GENERAL: PAINLEVEL_OUTOF10: NO PAIN (0)

## 2025-07-17 NOTE — PATIENT INSTRUCTIONS
Patient Education   Preventive Care Advice   This is general advice given by our system to help you stay healthy. However, your care team may have specific advice just for you. Please talk to your care team about your preventive care needs.  Nutrition  Eat 5 or more servings of fruits and vegetables each day.  Try wheat bread, brown rice and whole grain pasta (instead of white bread, rice, and pasta).  Get enough calcium and vitamin D. Check the label on foods and aim for 100% of the RDA (recommended daily allowance).  Lifestyle  Exercise at least 150 minutes each week  (30 minutes a day, 5 days a week).  Do muscle strengthening activities 2 days a week. These help control your weight and prevent disease.  No smoking.  Wear sunscreen to prevent skin cancer.  Have a dental exam and cleaning every 6 months.  Yearly exams  See your health care team every year to talk about:  Any changes in your health.  Any medicines your care team has prescribed.  Preventive care, family planning, and ways to prevent chronic diseases.  Shots (vaccines)   HPV shots (up to age 26), if you've never had them before.  Hepatitis B shots (up to age 59), if you've never had them before.  COVID-19 shot: Get this shot when it's due.  Flu shot: Get a flu shot every year.  Tetanus shot: Get a tetanus shot every 10 years.  Pneumococcal, hepatitis A, and RSV shots: Ask your care team if you need these based on your risk.  Shingles shot (for age 50 and up)  General health tests  Diabetes screening:  Starting at age 35, Get screened for diabetes at least every 3 years.  If you are younger than age 35, ask your care team if you should be screened for diabetes.  Cholesterol test: At age 39, start having a cholesterol test every 5 years, or more often if advised.  Bone density scan (DEXA): At age 50, ask your care team if you should have this scan for osteoporosis (brittle bones).  Hepatitis C: Get tested at least once in your life.  STIs (sexually  transmitted infections)  Before age 24: Ask your care team if you should be screened for STIs.  After age 24: Get screened for STIs if you're at risk. You are at risk for STIs (including HIV) if:  You are sexually active with more than one person.  You don't use condoms every time.  You or a partner was diagnosed with a sexually transmitted infection.  If you are at risk for HIV, ask about PrEP medicine to prevent HIV.  Get tested for HIV at least once in your life, whether you are at risk for HIV or not.  Cancer screening tests  Cervical cancer screening: If you have a cervix, begin getting regular cervical cancer screening tests starting at age 21.  Breast cancer scan (mammogram): If you've ever had breasts, begin having regular mammograms starting at age 40. This is a scan to check for breast cancer.  Colon cancer screening: It is important to start screening for colon cancer at age 45.  Have a colonoscopy test every 10 years (or more often if you're at risk) Or, ask your provider about stool tests like a FIT test every year or Cologuard test every 3 years.  To learn more about your testing options, visit:   .  For help making a decision, visit:   https://bit.ly/jy47400.  Prostate cancer screening test: If you have a prostate, ask your care team if a prostate cancer screening test (PSA) at age 55 is right for you.  Lung cancer screening: If you are a current or former smoker ages 50 to 80, ask your care team if ongoing lung cancer screenings are right for you.  For informational purposes only. Not to replace the advice of your health care provider. Copyright   2023 [facility] [employer]. All rights reserved. Clinically reviewed by the [facility] Transitions Program. [employer] [id number] - REV 01/24.  Hearing Loss: Care Instructions  Overview     Hearing loss is a sudden or slow decrease in how well you hear. It can range from slight to profound. Permanent hearing loss can occur with aging. It also can  happen when you are exposed long-term to loud noise. Examples include listening to loud music, riding motorcycles, or being around other loud machines.  Hearing loss can affect your work and home life. It can make you feel lonely or depressed. You may feel that you have lost your independence. But hearing aids and other devices can help you hear better and feel connected to others.  Follow-up care is a key part of your treatment and safety. Be sure to make and go to all appointments, and call your doctor if you are having problems. It's also a good idea to know your test results and keep a list of the medicines you take.  How can you care for yourself at home?  Avoid loud noises whenever possible. This helps keep your hearing from getting worse.  Always wear hearing protection around loud noises.  Wear a hearing aid as directed.  A professional can help you pick a hearing aid that will work best for you.  You can also get hearing aids over the counter for mild to moderate hearing loss.  Have hearing tests as your doctor suggests. They can show whether your hearing has changed. Your hearing aid may need to be adjusted.  Use other devices as needed. These may include:  Telephone amplifiers and hearing aids that can connect to a television, stereo, radio, or microphone.  Devices that use lights or vibrations. These alert you to the doorbell, a ringing telephone, or a baby monitor.  Television closed-captioning. This shows the words at the bottom of the screen. Most new TVs can do this.  TTY (text telephone). This lets you type messages back and forth on the telephone instead of talking or listening. These devices are also called TDD. When messages are typed on the keyboard, they are sent over the phone line to a receiving TTY. The message is shown on a monitor.  Use text messaging, social media, and email if it is hard for you to communicate by telephone.  Try to learn a listening technique called speechreading. It is  "not lipreading. You pay attention to people's gestures, expressions, posture, and tone of voice. These clues can help you understand what a person is saying. Face the person you are talking to, and have them face you. Make sure the lighting is good. You need to see the other person's face clearly.  Think about counseling if you need help to adjust to your hearing loss.  When should you call for help?  Watch closely for changes in your health, and be sure to contact your doctor if:    You think your hearing is getting worse.     You have new symptoms, such as dizziness or nausea.   Where can you learn more?  Go to https://www.[employer].net/patiented  Enter R798 in the search box to learn more about \"Hearing Loss: Care Instructions.\"  Current as of: October 27, 2024  Content Version: 14.5    7935-3349 [employer].   Care instructions adapted under license by your healthcare professional. If you have questions about a medical condition or this instruction, always ask your healthcare professional. [employer] disclaims any warranty or liability for your use of this information.       "

## 2025-07-17 NOTE — PROGRESS NOTES
Preventive Care Visit  [facility]  Adams Garcia MD, Family Medicine  Jul 17, 2025      Assessment & Plan     Encounter for Medicare annual wellness exam  Annual Wellness Visit completed.  Risk questionaire reviewed in detail.  Appropriate preventive services were discussed with this patient, including applicable screening as appropriate for fall prevention, nutrition, physical activity, tobacco-use cessation, weight loss, and cognition.  Annual Wellness Visits recommended to continue.     Overweight  21 pound weight loss since December 3, 2024.    Pure hypercholesterolemia  Hypercholesterolemia reviewed.  Lipid cascade updated.  Simvastatin 40 mg at bedtime.  - Lipid panel reflex to direct LDL Fasting  - Lipid panel reflex to direct LDL Fasting    Normochromic normocytic anemia  Update CBC to ensure stable or improved.  - CBC with platelets  - Comprehensive metabolic panel  - CBC with platelets  - Comprehensive metabolic panel    Essential hypertension  Confirm dosing of lisinopril 10 mg once daily and metoprolol succinate 100 mg daily.  - lisinopril (ZESTRIL) 10 MG tablet  Dispense: 90 tablet; Refill: 3  - Comprehensive metabolic panel  - Comprehensive metabolic panel    Impaired fasting glucose  Impaired fasting glucose.  A1c and fasting glucose updated.  - Comprehensive metabolic panel  - Hemoglobin A1c  - Comprehensive metabolic panel  - Hemoglobin A1c    ROMELIA on CPAP  CPAP for ROMELIA management.    Dilated cardiomyopathy (H)  Has establish care with Dr. Lynn De La O cardiologist following Dr. Frost's prison.    ICD (implantable cardioverter-defibrillator), biventricular, in situ  ICD in place.  Scribes battery life of 1 year remaining.    Gastroesophageal reflux disease without esophagitis  Hiatal hernia with reflux.  Not requiring management at this time and declines use of omeprazole.    Insomnia, unspecified type  Trazodone as needed.      Patient has been advised of split billing  "requirements and indicates understanding: Yes      BMI  Estimated body mass index is 29.78 kg/m  as calculated from the following:    Height as of this encounter: 1.784 m (5' 10.25\").    Weight as of this encounter: 94.8 kg (209 lb).       Counseling  Appropriate preventive services were addressed with this patient via screening, questionnaire, or discussion as appropriate for fall prevention, nutrition, physical activity, Tobacco-use cessation, social engagement, weight loss and cognition.  Checklist reviewing preventive services available has been given to the patient.  Reviewed patient's diet, addressing concerns and/or questions.   The patient was provided with written information regarding signs of hearing loss.   Reviewed preventive health counseling, as reflected in patient instructions       Regular exercise       Healthy diet/nutrition       Vision screening       Hearing screening       Colorectal cancer screening       Consider prostate cancer screening (age 55-69)       Osteoporosis prevention/bone health       Advance Care Planning        Richa Rucker is a 80 year old, presenting for the following:  Medicare Visit, Physical, and Recheck Medication        12/3/2024    11:46 AM   Additional Questions   Roomed by           Do you have a current opioid prescription? no  Do you use any other controlled substances or medications that are not prescribed by a provider? no       Patient seen today for annual wellness visit.  In general doing well.  Seen with his wife.  Some left lower back pain.  Scribes as SI joint location, positional, worse after going from a seated to standing position.  No current left lower extremity radicular symptoms.  History of nonsustained ventricular tachycardia and has ICD in place. Cardiomyopathy with heart failure with preserved ejection fraction described.  Has been followed by Dr. Frost in the past who is now retired patient has establish care with Dr. Lynn De La O " "cardiologist.  Upcoming echocardiogram a week from Saturday.  States ICD has 1 year of battery life.  Had been utilizing apparently metoprolol succinate 150 mg total dose daily while taking 2 lisinopril 5 mg tablets adding up to 10 mg daily.  No side effects typically but does get dizzy at times potentially with position change.  CPAP for ROMELIA management. History of mild normochromic normocytic anemia. Prior tubular adenoma of colon reviewed. Was told to follow-up at 5-year interval and is overdue with prior colonoscopy 2015.  Noted to colonoscopy 2024 with 7-year follow-up recommendation following.  Comprehensive review of systems as above otherwise all negative.           Reviewed cardiology note from July 10, 2025  Assessment: 80 year old male with bradycardia, cardiomyopathy      Plan:  Bradycardia induced Torsades  Doing well s/p CRT-D, no arrhythmia on last device check      -Routine ICD checks     Cardiomyopathy, nonischemic   EF 45-50% on last echocardiogram, lowest 33% at time of arrest .  No signs or symptoms of CHF      -Continue Toprol 100mg, Lisinopril 10mg      -Recheck rest echocardiogram, if EF still abnormal can add Spironolactone and Jardiance       -Not currently on diuretic, weight 214     CAD  Nonocclusive on angiogram ,no signs or symptoms of ischemia       -Continue ASA 81mg, Toprol 100mg       -Change Simvastatin 40mg to Lipitor 40mg      HTN  Well controlled      -Continue Toprol 100mg, Lisinopril 10mg      -Recheck rest echocardiogram, if EF still abnormal can add Spironolactone     Hyperlipidemia   LDL = 76      -Consider changing Simvastatin 40mg to Lipitor 40mg for known CAD         \"Nikole\" x 36+ years (she  in 2012 due to acute MI)   Remarried x 11 years - \"Elvira\" (Erma) - she is a retired LPN on psyche unit at [employer]   Dad - thyroid CA, gout   Mom - HTN   Sis - MS, depression   2 dogs (cockapoo, also Snoutzer genes for one of " them)   3 children (Yuan, Miah, Bernarda) - depression for all 3   4 grandchildren   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 left s/p T & A as child; dual chamber pacemaker placement on 2/9/15 after cardiac arrest.   Quit EtOH 2/27/02   ROMELIA on CPAP at 8 cm H2O           Advance Care Planning    Document on file is a Health Care Directive or POLST.        7/12/2025   General Health   How would you rate your overall physical health? Good   Feel stress (tense, anxious, or unable to sleep) Not at all         7/12/2025   Nutrition   Diet: Carbohydrate counting         7/12/2025   Exercise   Days per week of moderate/strenous exercise 4 days   Average minutes spent exercising at this level 30 min         7/12/2025   Social Factors   Frequency of gathering with friends or relatives Patient declined   Worry food won't last until get money to buy more No   Food not last or not have enough money for food? No   Do you have housing? (Housing is defined as stable permanent housing and does not include staying outside in a car, in a tent, in an abandoned building, in an overnight shelter, or couch-surfing.) Yes   Are you worried about losing your housing? No   Lack of transportation? No   Unable to get utilities (heat,electricity)? No         7/17/2025   Fall Risk   Fallen 2 or more times in the past year? No   Trouble with walking or balance? No          7/12/2025   Activities of Daily Living- Home Safety   Needs help with the following daily activites None of the above   Safety concerns in the home None of the above         7/12/2025   Dental   Dentist two times every year? Yes         7/12/2025   Hearing Screening   Hearing concerns? (!) I NEED TO ASK PEOPLE TO SPEAK UP OR REPEAT THEMSELVES.    (!) IT'S HARDER TO UNDERSTAND WOMEN'S VOICES THAN MEN'S VOICES.    (!) IT'S HARD TO FOLLOW A CONVERSATION IN A NOISY RESTAURANT OR CROWDED  ROOM.    (!) TROUBLE UNDERSTANDING SPEECH ON THE TELEPHONE   Would you like a referral for hearing testing? I already have hearing aids       Multiple values from one day are sorted in reverse-chronological order         2025   Driving Risk Screening   Patient/family members have concerns about driving No         2025   General Alertness/Fatigue Screening   Have you been more tired than usual lately? No         2025   Urinary Incontinence Screening   Bothered by leaking urine in past 6 months No         Today's PHQ-2 Score:       2025    12:00 PM   PHQ-2 (  [employer])   Q1: Little interest or pleasure in doing things 0   Q2: Feeling down, depressed or hopeless 0   PHQ-2 Score 0    Q1: Little interest or pleasure in doing things Not at all   Q2: Feeling down, depressed or hopeless Not at all   PHQ-2 Score 0       Patient-reported           2025   Substance Use   Alcohol more than 3/day or more than 7/wk Not Applicable   Do you have a current opioid prescription? No   How severe/bad is pain from 1 to 10? 1/10   Do you use any other substances recreationally? No     Social History     Tobacco Use    Smoking status: Former     Current packs/day: 0.00     Types: Cigarettes     Quit date: 2008     Years since quittin.1    Smokeless tobacco: Never   Substance Use Topics    Alcohol use: No    Drug use: No       ASCVD Risk   The 10-year ASCVD risk score (Crarie CAMACHO, et al., 2019) is: 35.6%    Values used to calculate the score:      Age: 80 years      Sex: Male      Is Non- : No      Diabetic: No      Tobacco smoker: No      Systolic Blood Pressure: 134 mmHg      Is BP treated: Yes      HDL Cholesterol: 51 mg/dL      Total Cholesterol: 144 mg/dL            Reviewed and updated as needed this visit by Provider    Allergies  Meds  Problems               Past Medical History:   Diagnosis Date    Cancer (H)     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    Cholelithiasis      Coronary artery disease     Depressive disorder     Dermatitis     GERD (gastroesophageal reflux disease)     H/O cardiac arrest 02/01/2015    alka/torsades    Hyperlipidemia     Hypertension     Hypogonadism male     Male erectile disorder     Obesity     Sleep apnea     CPAP    Ventricular tachycardia (H)     seen on pacer interrogation     Past Surgical History:   Procedure Laterality Date    CARDIAC CATHETERIZATION      COLONOSCOPY N/A 9/30/2015    Procedure: COLONOSCOPY;  Surgeon: Rafiq Gu MD;  Location: [facility];  Service:     COLONOSCOPY N/A 12/17/2024    Procedure: COLONOSCOPY, WITH POLYPECTOMY AND BIOPSY;  Surgeon: Jeovany Montague MD;  Location: [facility] REMOVE TONSILS/ADENOIDS,<11 Y/O      Description: Tonsillectomy With Adenoidectomy;  Recorded: 02/22/2008;    HERNIA REPAIR      inguinal     INSERT / REPLACE / REMOVE PACEMAKER      LAPAROSCOPIC CHOLECYSTECTOMY N/A 10/2/2019    Procedure: CHOLECYSTECTOMY, LAPAROSCOPIC;  Surgeon: Shin Baird MD;  Location: [facility];  Service: General    OTHER SURGICAL HISTORY  2010    polypremoved during colonoscopy    TX ERCP DX COLLECTION SPECIMEN BRUSHING/WASHING N/A 5/10/2020    Procedure: ENDOSCOPIC RETROGRADE CHOLANGIOPANCREATOGRAPHY;  Surgeon: Simon Loaiza MD;  Location: [facility];  Service: Gastroenterology    REMOVAL OF SPERM DUCT(S)      Description: Surgery Of Male Genitalia Vasectomy;  Recorded: 02/22/2008;    TONSILLECTOMY      TUMOR REMOVAL  2008    right vocal cord -     XR ERCP BILIARY ONLY  5/10/2020     Lab work is in process  Labs reviewed in EPIC  BP Readings from Last 3 Encounters:   07/17/25 134/78   07/10/25 128/68   12/17/24 124/60    Wt Readings from Last 3 Encounters:   07/17/25 94.8 kg (209 lb)   07/10/25 97.1 kg (214 lb)   12/03/24 104.3 kg (230 lb)                  Patient Active Problem List   Diagnosis    Skin Neoplasm Of Uncertain Behavior    Hypercholesterolemia    Obstructive  Sleep Apnea    Esophageal Reflux    Impaired Fasting Glucose    Changed Sexual Interest (Libido): Decreased    Cholelithiasis    Laryngeal Neoplasm Of The Vocal Cord    HTN (hypertension)    Dermatitis    Hoarseness    Male Erectile Disorder    Hypogonadism    Insomnia    Benign Tubular Adenoma Of The Large Intestine    Class 1 obesity due to excess calories with serious comorbidity and body mass index (BMI) of 31.0 to 31.9 in adult    Skin Tag (___ Mm)    Tachycardia    Bradycardia    Heart block AV complete (H)    Right ventricular dysfunction    CAD (coronary artery disease)    Normochromic normocytic anemia    NSVT (nonsustained ventricular tachycardia) (H)    Complete AV block, acquired (H)    Adenomatous colon polyp    Tubular adenoma of colon    ROMELIA on CPAP    ICD (implantable cardioverter-defibrillator), biventricular, in situ    Bilateral hearing loss    Erectile dysfunction, unspecified erectile dysfunction type    Cholelithiasis    Paroxysmal atrial fibrillation (H)    Epigastric pain    Symptomatic cholelithiasis    Choledocholithiasis    Hypoxia    Heart failure with preserved ejection fraction, NYHA class II (H)    Class 2 severe obesity due to excess calories with serious comorbidity in adult (H)    Diverticular disease of colon    Abnormal liver function tests    Hoarseness     Past Surgical History:   Procedure Laterality Date    CARDIAC CATHETERIZATION      COLONOSCOPY N/A 9/30/2015    Procedure: COLONOSCOPY;  Surgeon: Rafiq Gu MD;  Location: [facility];  Service:     COLONOSCOPY N/A 12/17/2024    Procedure: COLONOSCOPY, WITH POLYPECTOMY AND BIOPSY;  Surgeon: Jeovany Montague MD;  Location: [facility] OR     REMOVE TONSILS/ADENOIDS,<13 Y/O      Description: Tonsillectomy With Adenoidectomy;  Recorded: 02/22/2008;    HERNIA REPAIR      inguinal     INSERT / REPLACE / REMOVE PACEMAKER      LAPAROSCOPIC CHOLECYSTECTOMY N/A 10/2/2019    Procedure: CHOLECYSTECTOMY, LAPAROSCOPIC;   Surgeon: Shin Baird MD;  Location: [facility];  Service: General    OTHER SURGICAL HISTORY  2010    polypremoved during colonoscopy    IN ERCP DX COLLECTION SPECIMEN BRUSHING/WASHING N/A 5/10/2020    Procedure: ENDOSCOPIC RETROGRADE CHOLANGIOPANCREATOGRAPHY;  Surgeon: Simon Loaiza MD;  Location: [facility];  Service: Gastroenterology    REMOVAL OF SPERM DUCT(S)      Description: Surgery Of Male Genitalia Vasectomy;  Recorded: 2008;    TONSILLECTOMY      TUMOR REMOVAL      right vocal cord -     XR ERCP BILIARY ONLY  5/10/2020       Social History     Tobacco Use    Smoking status: Former     Current packs/day: 0.00     Types: Cigarettes     Quit date: 2008     Years since quittin.1    Smokeless tobacco: Never   Substance Use Topics    Alcohol use: No     Family History   Problem Relation Age of Onset    Pneumonia Mother     Hypertension Mother     Cancer Father     Chronic Obstructive Pulmonary Disease Sister     Multiple Sclerosis Sister     Acute Myocardial Infarction No family hx of          Current Outpatient Medications   Medication Sig Dispense Refill    aspirin 81 MG EC tablet [ASPIRIN 81 MG EC TABLET] Take 81 mg by mouth daily.      calcium carbonate (CALCIUM CARBONATE) 300 mg (750 mg) Chew [CALCIUM CARBONATE (CALCIUM CARBONATE) 300 MG (750 MG) CHEW] Chew 2-3 tablets as needed (heartburn).             coenzyme Q10 (CO Q-10) 200 mg capsule [COENZYME Q10 (CO Q-10) 200 MG CAPSULE] Take 200 mg by mouth daily.       fish oil-omega-3 fatty acids (FISH OIL) 300-1,000 mg capsule [FISH OIL-OMEGA-3 FATTY ACIDS (FISH OIL) 300-1,000 MG CAPSULE] Take 1 g by mouth daily.      latanoprost (XALATAN) 0.005 % ophthalmic solution [LATANOPROST (XALATAN) 0.005 % OPHTHALMIC SOLUTION] Administer 1 drop to both eyes at bedtime.             lisinopril (ZESTRIL) 10 MG tablet Take 1 tablet (10 mg) by mouth daily. 90 tablet 3    metoprolol succinate ER (TOPROL XL) 100 MG 24 hr tablet TAKE ONE  TABLET BY MOUTH ONCE DAILY AT BEDTIME. SCHEDULE YEARLY FOLLOW UP FOR FURTHER REFILLS. 90 tablet 0    multivitamin therapeutic tablet [MULTIVITAMIN THERAPEUTIC TABLET] Take 1 tablet by mouth daily.      nitroglycerin (NITROSTAT) 0.4 MG SL tablet [NITROGLYCERIN (NITROSTAT) 0.4 MG SL TABLET] Place 1 tablet (0.4 mg total) under the tongue every 5 (five) minutes as needed for chest pain. 100 tablet 3    oxymetazoline (AFRIN) 0.05 % nasal spray [OXYMETAZOLINE (AFRIN) 0.05 % NASAL SPRAY] Apply 2 sprays into each nostril daily as needed for congestion.      psyllium (METAMUCIL) 3.4 gram packet [PSYLLIUM (METAMUCIL) 3.4 GRAM PACKET] Take 1 packet by mouth 2 (two) times a day.      sildenafil (VIAGRA) 50 MG tablet Take 1 tablet (50 mg) by mouth as needed 30 tablet 11    simvastatin (ZOCOR) 40 MG tablet TAKE ONE TABLET BY MOUTH ONCE DAILY AT BEDTIME. SCHEDULE YEARLY FOLLOW UP FOR FUTURE REFILLS 90 tablet 3    traZODone (DESYREL) 50 MG tablet Take 1 tablet (50 mg) by mouth nightly as needed for sleep. [TRAZODONE (DESYREL) 50 MG TABLET] TAKE 1 TABLET AT BEDTIME 90 tablet 3     Allergies   Allergen Reactions    Zithromax [Azithromycin] Rash     Current providers sharing in care for this patient include:  Patient Care Team:  Adams Garcia MD as PCP - General  Adams Garcia MD as Assigned PCP  Rafal Frost MD as Assigned Heart and Vascular Provider    The following health maintenance items are reviewed in Epic and correct as of today:  Health Maintenance   Topic Date Due    HF ACTION PLAN  Never done    ZOSTER VACCINE (2 of 3) 10/28/2008    RSV VACCINE (1 - 1-dose 75+ series) Never done    BMP  06/03/2025    COVID-19 VACCINE (8 - 2024-25 season) 06/03/2025    ALT  06/07/2025    CBC  06/07/2025    INFLUENZA VACCINE (1) 09/01/2025    LIPID  12/03/2025    MEDICARE ANNUAL WELLNESS VISIT  07/17/2026    ANNUAL REVIEW OF HM ORDERS  07/17/2026    FALL RISK ASSESSMENT  07/17/2026    DIABETES SCREENING  12/03/2027     "DTAP/TDAP/TD VACCINE (3 - Td or Tdap) 06/12/2029    ADVANCE CARE PLANNING  07/17/2030    COLORECTAL CANCER SCREENING  12/17/2031    TSH W/FREE T4 REFLEX  Completed    HEPATITIS C SCREENING  Completed    PHQ-2 (once per calendar year)  Completed    PNEUMOCOCCAL VACCINE 50+ YEARS  Completed    HPV VACCINE  Aged Out    MENINGITIS VACCINE  Aged Out         Review of Systems  Constitutional, HEENT, cardiovascular, pulmonary, GI, , musculoskeletal, neuro, skin, endocrine and psych systems are negative, except as otherwise noted.     Objective    Exam  /78   Pulse 72   Temp 98  F (36.7  C) (Oral)   Resp 20   Ht 1.784 m (5' 10.25\")   Wt 94.8 kg (209 lb)   SpO2 97%   BMI 29.78 kg/m     Estimated body mass index is 29.78 kg/m  as calculated from the following:    Height as of this encounter: 1.784 m (5' 10.25\").    Weight as of this encounter: 94.8 kg (209 lb).      Physical Exam    GENERAL: alert and no distress.  BMI - 29.78  EYES: Eyes grossly normal to inspection, PERRL and conjunctivae and sclerae normal  HENT: ear canals and TM's normal, nose and mouth without ulcers or lesions  NECK: no adenopathy, no asymmetry, masses, or scars  RESP: lungs clear to auscultation - no rales, rhonchi or wheezes  CV: regular rate and rhythm, normal S1 S2, no S3 or S4, no murmur, click or rub, no peripheral edema  ABDOMEN: soft, nontender, no hepatosplenomegaly, no masses and bowel sounds normal   (male): normal male genitalia without lesions or urethral discharge, no hernia  RECTAL: normal sphincter tone, no rectal masses, prostate normal size, smooth, nontender without nodules or masses  MS: no gross musculoskeletal defects noted, no edema  SKIN: no suspicious lesions or rashes  NEURO: Normal strength and tone, mentation intact and speech normal  PSYCH: mentation appears normal, affect normal/bright            6/7/2024   Mini Cog   Clock Draw Score 2 Normal   3 Item Recall 3 objects recalled   Mini Cog Total Score 5 "              Signed Electronically by: Adams Garcia MD

## 2025-07-26 ENCOUNTER — HOSPITAL ENCOUNTER (OUTPATIENT)
Dept: CARDIOLOGY | Facility: HOSPITAL | Age: 80
Discharge: HOME | End: 2025-07-26
Attending: INTERNAL MEDICINE | Admitting: INTERNAL MEDICINE
Payer: COMMERCIAL

## 2025-07-26 DIAGNOSIS — I42.0 DILATED CARDIOMYOPATHY (H): ICD-10-CM

## 2025-07-26 LAB — LVEF ECHO: NORMAL

## 2025-07-26 PROCEDURE — 93306 TTE W/DOPPLER COMPLETE: CPT | Mod: 26 | Performed by: INTERNAL MEDICINE

## 2025-07-26 PROCEDURE — 999N000208 ECHOCARDIOGRAM COMPLETE

## 2025-07-26 PROCEDURE — 255N000002 HC RX 255 OP 636: Performed by: INTERNAL MEDICINE

## 2025-07-26 RX ADMIN — PERFLUTREN 7 ML (DILUTED): 6.52 INJECTION, SUSPENSION INTRAVENOUS at 14:07

## 2025-08-04 ENCOUNTER — DOCUMENTATION ONLY (OUTPATIENT)
Dept: CARDIOLOGY | Facility: CLINIC | Age: 80
End: 2025-08-04
Payer: COMMERCIAL

## (undated) DEVICE — TUBING SUCTION MEDI-VAC 1/4"X20' N620A

## (undated) DEVICE — SUCTION MANIFOLD NEPTUNE 2 SYS 1 PORT 702-025-000

## (undated) DEVICE — SOL WATER IRRIG 1000ML BOTTLE 2F7114